# Patient Record
Sex: FEMALE | Race: WHITE | Employment: FULL TIME | URBAN - METROPOLITAN AREA
[De-identification: names, ages, dates, MRNs, and addresses within clinical notes are randomized per-mention and may not be internally consistent; named-entity substitution may affect disease eponyms.]

---

## 2017-02-07 RX ORDER — CITALOPRAM 40 MG/1
TABLET, FILM COATED ORAL
Qty: 30 TAB | Refills: 0 | OUTPATIENT
Start: 2017-02-07

## 2017-02-09 ENCOUNTER — HOSPITAL ENCOUNTER (OUTPATIENT)
Dept: MAMMOGRAPHY | Age: 67
Discharge: HOME | End: 2017-02-09
Attending: FAMILY MEDICINE
Payer: MEDICARE

## 2017-02-09 DIAGNOSIS — Z12.31 VISIT FOR SCREENING MAMMOGRAM: ICD-10-CM

## 2017-02-09 PROCEDURE — 77067 SCR MAMMO BI INCL CAD: CPT

## 2017-02-14 ENCOUNTER — OFFICE VISIT (OUTPATIENT)
Dept: FAMILY MEDICINE CLINIC | Age: 67
End: 2017-02-14

## 2017-02-14 VITALS
WEIGHT: 144.4 LBS | RESPIRATION RATE: 18 BRPM | HEART RATE: 69 BPM | BODY MASS INDEX: 25.59 KG/M2 | DIASTOLIC BLOOD PRESSURE: 80 MMHG | TEMPERATURE: 98.1 F | HEIGHT: 63 IN | SYSTOLIC BLOOD PRESSURE: 132 MMHG | OXYGEN SATURATION: 98 %

## 2017-02-14 DIAGNOSIS — F41.8 DEPRESSION WITH ANXIETY: ICD-10-CM

## 2017-02-14 DIAGNOSIS — R73.03 PRE-DIABETES: ICD-10-CM

## 2017-02-14 DIAGNOSIS — Z00.00 ROUTINE GENERAL MEDICAL EXAMINATION AT A HEALTH CARE FACILITY: ICD-10-CM

## 2017-02-14 DIAGNOSIS — Z00.00 ENCOUNTER FOR MEDICARE ANNUAL WELLNESS EXAM: Primary | ICD-10-CM

## 2017-02-14 DIAGNOSIS — E55.9 VITAMIN D DEFICIENCY: ICD-10-CM

## 2017-02-14 DIAGNOSIS — R31.1 BENIGN MICROSCOPIC HEMATURIA: Chronic | ICD-10-CM

## 2017-02-14 DIAGNOSIS — R31.9 HEMATURIA: ICD-10-CM

## 2017-02-14 DIAGNOSIS — E78.5 HYPERLIPIDEMIA, UNSPECIFIED HYPERLIPIDEMIA TYPE: ICD-10-CM

## 2017-02-14 DIAGNOSIS — M25.551 PAIN OF BOTH HIP JOINTS: ICD-10-CM

## 2017-02-14 DIAGNOSIS — M25.552 PAIN OF BOTH HIP JOINTS: ICD-10-CM

## 2017-02-14 DIAGNOSIS — Z87.891 FORMER SMOKER: ICD-10-CM

## 2017-02-14 DIAGNOSIS — R00.1 BRADYCARDIA: ICD-10-CM

## 2017-02-14 RX ORDER — NAPROXEN 250 MG/1
TABLET ORAL 2 TIMES DAILY WITH MEALS
COMMUNITY
Start: 2017-02-14 | End: 2021-12-08 | Stop reason: ALTCHOICE

## 2017-02-14 NOTE — MR AVS SNAPSHOT
Visit Information Date & Time Provider Department Dept. Phone Encounter #  
 2/14/2017  2:30 PM Rita Truong  [facility] [phone] [id number] Upcoming Health Maintenance Date Due Hepatitis C Screening 1950 GLAUCOMA SCREENING Q2Y 10/23/2017 Pneumococcal 65+ Low/Medium Risk (2 of 2 - PPSV23) 2/14/2018 MEDICARE YEARLY EXAM 2/15/2018 BREAST CANCER SCRN MAMMOGRAM 2/9/2019 COLONOSCOPY 11/2/2020 DTaP/Tdap/Td series (2 - Td) 11/18/2020 Allergies as of 2/14/2017  Review Complete On: 2/14/2017 By: Rita Truong MD  
  
 Severity Noted Reaction Type Reactions Wellbutrin [Bupropion]  03/11/2010    Unable to Obtain Current Immunizations  Reviewed on 9/16/2013 Name Date TDAP Vaccine 11/18/2010 Not reviewed this visit You Were Diagnosed With   
  
 Codes Comments Encounter for Medicare annual wellness exam    -  Primary ICD-10-CM: Z00.00 ICD-9-CM: V70.0 Hyperlipidemia, unspecified hyperlipidemia type     ICD-10-CM: E78.5 ICD-9-CM: 272.4 Pre-diabetes     ICD-10-CM: R73.03 
ICD-9-CM: 790.29 Former smoker     ICD-10-CM: J39.104 ICD-9-CM: V15.82 Depression with anxiety     ICD-10-CM: F41.8 ICD-9-CM: 300.4 Routine general medical examination at a health care facility     ICD-10-CM: Z00.00 ICD-9-CM: V70.0 Vitamin D deficiency     ICD-10-CM: E55.9 ICD-9-CM: 268.9 Pain of both hip joints     ICD-10-CM: M25.551, M25.552 ICD-9-CM: 719.45 Bradycardia     ICD-10-CM: R00.1 ICD-9-CM: 427.89 Hematuria     ICD-10-CM: R31.9 ICD-9-CM: 599.70 Vitals BP Pulse Temp Resp Height(growth percentile) Weight(growth percentile) 132/80 (BP 1 Location: Right arm, BP Patient Position: Sitting) 69 98.1 °F (36.7 °C) (Oral) 18 5' 3\" (1.6 m) 144 lb 6.4 oz (65.5 kg) LMP SpO2 BMI OB Status Smoking Status 01/01/2000 98% 25.58 kg/m2 Postmenopausal Former Smoker Vitals History BMI and BSA Data Body Mass Index Body Surface Area 25.58 kg/m 2 1.71 m 2 Preferred Pharmacy Pharmacy Name Phone [place], [address] [phone] Your Updated Medication List  
  
   
This list is accurate as of: 2/14/17  7:32 PM.  Always use your most recent med list.  
  
  
  
  
 ALPRAZolam 0.5 mg tablet Commonly known as:  XANAX  
take 1 tablet by mouth at bedtime  
  
 citalopram 40 mg tablet Commonly known as:  CELEXA  
take 1 tablet by mouth once daily  
  
 naproxen 250 mg tablet Commonly known as:  NAPROSYN Take  by mouth two (2) times daily (with meals). We Performed the Following AMB POC EKG ROUTINE W/ 12 LEADS, INTER & REP [51349 CPT(R)] CBC W/O DIFF [08714 CPT(R)] LIPID PANEL [55376 CPT(R)] METABOLIC PANEL, COMPREHENSIVE [93755 CPT(R)] IA COLLECTION VENOUS BLOOD,VENIPUNCTURE [id number] CPT(R)] IA HANDLG&/OR CONVEY OF SPEC FOR TR OFFICE TO LAB [07025 CPT(R)] Patient Instructions Well Visit, Women 48 to 72: Care Instructions Your Care Instructions Physical exams can help you stay healthy. Your doctor has checked your overall health and may have suggested ways to take good care of yourself. He or she also may have recommended tests. At home, you can help prevent illness with healthy eating, regular exercise, and other steps. Follow-up care is a key part of your treatment and safety. Be sure to make and go to all appointments, and call your doctor if you are having problems. It's also a good idea to know your test results and keep a list of the medicines you take. How can you care for yourself at home? · Reach and stay at a healthy weight. This will lower your risk for many problems, such as obesity, diabetes, heart disease, and high blood pressure. · Get at least 30 minutes of exercise on most days of the week.  Walking is a good choice. You also may want to do other activities, such as running, swimming, cycling, or playing tennis or team sports. · Do not smoke. Smoking can make health problems worse. If you need help quitting, talk to your doctor about stop-smoking programs and medicines. These can increase your chances of quitting for good. · Protect your skin from too much sun. When you're outdoors from 10 a.m. to 4 p.m., stay in the shade or cover up with clothing and a hat with a wide brim. Wear sunglasses that block UV rays. Even when it's cloudy, put broad-spectrum sunscreen (SPF 30 or higher) on any exposed skin. · See a dentist one or two times a year for checkups and to have your teeth cleaned. · Wear a seat belt in the car. · Limit alcohol to 1 drink a day. Too much alcohol can cause health problems. Follow your doctor's advice about when to have certain tests. These tests can spot problems early. · Cholesterol. Your doctor will tell you how often to have this done based on your age, family history, or other things that can increase your risk for heart attack and stroke. · Blood pressure. Have your blood pressure checked during a routine doctor visit. Your doctor will tell you how often to check your blood pressure based on your age, your blood pressure results, and other factors. · Mammogram. Ask your doctor how often you should have a mammogram, which is an X-ray of your breasts. A mammogram can spot breast cancer before it can be felt and when it is easiest to treat. · Pap test and pelvic exam. Ask your doctor how often you should have a Pap test. You may not need to have a Pap test as often as you used to. · Vision. Have your eyes checked every year or two or as often as your doctor suggests. Some experts recommend that you have yearly exams for glaucoma and other age-related eye problems starting at age 48. · Hearing. Tell your doctor if you notice any change in your hearing.  You can have tests to find out how well you hear. · Diabetes. Ask your doctor whether you should have tests for diabetes. · Colon cancer. You should begin tests for colon cancer at age 48. You may have one of several tests. Your doctor will tell you how often to have tests based on your age and risk. Risks include whether you already had a precancerous polyp removed from your colon or whether your parents, sisters and brothers, or children have had colon cancer. · Thyroid disease. Talk to your doctor about whether to have your thyroid checked as part of a regular physical exam. Women have an increased chance of a thyroid problem. · Osteoporosis. You should begin tests for bone density at age 72. If you are younger than 72, ask your doctor whether you have factors that may increase your risk for this disease. You may want to have this test before age 72. · Heart attack and stroke risk. At least every 4 to 6 years, you should have your risk for heart attack and stroke assessed. Your doctor uses factors such as your age, blood pressure, cholesterol, and whether you smoke or have diabetes to show what your risk for a heart attack or stroke is over the next 10 years. When should you call for help? Watch closely for changes in your health, and be sure to contact your doctor if you have any problems or symptoms that concern you. Where can you learn more? Go to http://elena-jason.info/. Enter F117 in the search box to learn more about \"Well Visit, Women 50 to 72: Care Instructions. \" Current as of: July 19, 2016 Content Version: 11.1 © 1573-1539 [employer]. Care instructions adapted under license by "[employer] (which disclaims liability or warranty for this information).  If you have questions about a medical condition or this instruction, always ask your healthcare professional. [place] [address] any warranty or liability for your use of this information. Introducing [facility]! [employer] introduces Move In History patient portal. Now you can access parts of your medical record, email your doctor's office, and request medication refills online. 1. In your internet browser, go to [email or web address] 2. Click on the First Time User? Click Here link in the Sign In box. You will see the New Member Sign Up page. 3. Enter your [employer] Access Code exactly as it appears below. You will not need to use this code after youve completed the sign-up process. If you do not sign up before the expiration date, you must request a new code. · Move In History Access Code: [facility] Expires: 4/13/2017  2:45 PM 
 
4. Enter the last four digits of your Social Security Number (xxxx) and Date of Birth (mm/dd/yyyy) as indicated and click Submit. You will be taken to the next sign-up page. 5. Create a Move In History ID. This will be your Move In History login ID and cannot be changed, so think of one that is secure and easy to remember. 6. Create a Move In History password. You can change your password at any time. 7. Enter your Password Reset Question and Answer. This can be used at a later time if you forget your password. 8. Enter your e-mail address. You will receive e-mail notification when new information is available in [address] 9. Click Sign Up. You can now view and download portions of your medical record. 10. Click the Download Summary menu link to download a portable copy of your medical information. If you have questions, please visit the Frequently Asked Questions section of the Move In History website. Remember, Move In History is NOT to be used for urgent needs. For medical emergencies, dial 911. Now available from your iPhone and Android! Please provide this summary of care documentation to your next provider. Your primary care clinician is listed as [place] [address], [facility]/s   If you have any questions after today's visit, please call [phone].

## 2017-02-14 NOTE — PATIENT INSTRUCTIONS
Well Visit, Women 48 to 72: Care Instructions  Your Care Instructions  Physical exams can help you stay healthy. Your doctor has checked your overall health and may have suggested ways to take good care of yourself. He or she also may have recommended tests. At home, you can help prevent illness with healthy eating, regular exercise, and other steps. Follow-up care is a key part of your treatment and safety. Be sure to make and go to all appointments, and call your doctor if you are having problems. It's also a good idea to know your test results and keep a list of the medicines you take. How can you care for yourself at home? · Reach and stay at a healthy weight. This will lower your risk for many problems, such as obesity, diabetes, heart disease, and high blood pressure. · Get at least 30 minutes of exercise on most days of the week. Walking is a good choice. You also may want to do other activities, such as running, swimming, cycling, or playing tennis or team sports. · Do not smoke. Smoking can make health problems worse. If you need help quitting, talk to your doctor about stop-smoking programs and medicines. These can increase your chances of quitting for good. · Protect your skin from too much sun. When you're outdoors from 10 a.m. to 4 p.m., stay in the shade or cover up with clothing and a hat with a wide brim. Wear sunglasses that block UV rays. Even when it's cloudy, put broad-spectrum sunscreen (SPF 30 or higher) on any exposed skin. · See a dentist one or two times a year for checkups and to have your teeth cleaned. · Wear a seat belt in the car. · Limit alcohol to 1 drink a day. Too much alcohol can cause health problems. Follow your doctor's advice about when to have certain tests. These tests can spot problems early. · Cholesterol.  Your doctor will tell you how often to have this done based on your age, family history, or other things that can increase your risk for heart attack and stroke. · Blood pressure. Have your blood pressure checked during a routine doctor visit. Your doctor will tell you how often to check your blood pressure based on your age, your blood pressure results, and other factors. · Mammogram. Ask your doctor how often you should have a mammogram, which is an X-ray of your breasts. A mammogram can spot breast cancer before it can be felt and when it is easiest to treat. · Pap test and pelvic exam. Ask your doctor how often you should have a Pap test. You may not need to have a Pap test as often as you used to. · Vision. Have your eyes checked every year or two or as often as your doctor suggests. Some experts recommend that you have yearly exams for glaucoma and other age-related eye problems starting at age 48. · Hearing. Tell your doctor if you notice any change in your hearing. You can have tests to find out how well you hear. · Diabetes. Ask your doctor whether you should have tests for diabetes. · Colon cancer. You should begin tests for colon cancer at age 48. You may have one of several tests. Your doctor will tell you how often to have tests based on your age and risk. Risks include whether you already had a precancerous polyp removed from your colon or whether your parents, sisters and brothers, or children have had colon cancer. · Thyroid disease. Talk to your doctor about whether to have your thyroid checked as part of a regular physical exam. Women have an increased chance of a thyroid problem. · Osteoporosis. You should begin tests for bone density at age 72. If you are younger than 72, ask your doctor whether you have factors that may increase your risk for this disease. You may want to have this test before age 72. · Heart attack and stroke risk. At least every 4 to 6 years, you should have your risk for heart attack and stroke assessed.  Your doctor uses factors such as your age, blood pressure, cholesterol, and whether you smoke or have diabetes to show what your risk for a heart attack or stroke is over the next 10 years. When should you call for help? Watch closely for changes in your health, and be sure to contact your doctor if you have any problems or symptoms that concern you. Where can you learn more? Go to http://elena-jason.info/. Enter T211 in the search box to learn more about \"Well Visit, Women 50 to 72: Care Instructions. \"  Current as of: July 19, 2016  Content Version: 11.1  © 3797-6498 [employer]. Care instructions adapted under license by [employer] (which disclaims liability or warranty for this information). If you have questions about a medical condition or this instruction, always ask your healthcare professional. [address] any warranty or liability for your use of this information.

## 2017-02-14 NOTE — PROGRESS NOTES
HISTORY OF PRESENT ILLNESS  HPI  Fco Cooper is a 77 y.o. Female with history of depression, anxiety, and hyperlipidemia who presents to office today for fasting annual Medicare wellness visit. Pt reports she has never taken Pravastatin for her cholesterol, stating she preferred to try and lose weight when it was first prescribed. 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 Pt [facility] every six months. She also notes a history of basal cell removal from her nose. Pt had negative mammogram one week ago at [facility] and reports all mammograms have been normal. She stopped getting a pap smear at age 61. Pap smears were always normal.    Pt complains of pain and lump under left cheek since 12/2017. She denies that the pain is aggravated by coughing or bending over. Pt notes chronic microscopic hematuria. She reports a complete work up around 1 year ago that showed no abnormalities. Pt takes naproxen BID for hip and shoulder pain. Pt also takes Omeprazole OTC 20mg for heartburn. She has been taking Xanax 0.5mg at bedtime for the past 2-3 years. Past Medical History   Diagnosis Date    Anxiety     Depression     Former smoker 3/11/2010    Hyperlipidemia 4/14/2014    Leg cramps 3/11/2010    Postmenopausal HRT (hormone replacement therapy) 3/11/2010     stopped 2010     Past Surgical History   Procedure Laterality Date    Hx gi       hemmorhoidectomy, fissure    Hx breast biopsy Right yrs ago     neg; u/s guided     Current Outpatient Prescriptions on File Prior to Visit   Medication Sig Dispense Refill    ALPRAZolam (XANAX) 0.5 mg tablet take 1 tablet by mouth at bedtime 30 Tab 2    citalopram (CELEXA) 40 mg tablet take 1 tablet by mouth once daily 30 Tab 11     No current facility-administered medications on file prior to visit.       Allergies   Allergen Reactions    Wellbutrin [Bupropion] Unable to Obtain     Family History   Problem Relation Age of Onset    Stroke Mother     Cancer Father      larynx    Cancer Sister      BRCA    Breast Cancer Sister 48    Diabetes Brother     Diabetes Sister     Psychiatric Disorder Sister      Rufus Wu    Breast Cancer Niece      Social History     Social History    Marital status:      Spouse name: N/A    Number of children: N/A    Years of education: N/A     Social History Main Topics    Smoking status: Former Smoker     Packs/day: 1.00     Years: 30.00     Types: Cigarettes     Quit date: 1/1/2005    Smokeless tobacco: Never Used    Alcohol use 0.0 oz/week     0 Standard drinks or equivalent per week      Comment: ocassional    Drug use: No    Sexual activity: Yes     Partners: Male     Other Topics Concern    None     Social History Narrative             Review of Systems   Constitutional: Negative for chills, diaphoresis, fever, malaise/fatigue and weight loss. HENT: Negative for congestion, ear discharge, ear pain, hearing loss, nosebleeds, sore throat and tinnitus. Tender lump under left cheek   Eyes: Negative for blurred vision, double vision, photophobia, pain, discharge and redness. Respiratory: Negative for cough, hemoptysis, sputum production, shortness of breath, wheezing and stridor. Cardiovascular: Negative for chest pain, palpitations, orthopnea, claudication, leg swelling and PND. Gastrointestinal: Negative for abdominal pain, blood in stool, constipation, diarrhea, heartburn, melena, nausea and vomiting. Genitourinary: Negative for dysuria, flank pain, frequency, hematuria and urgency. Musculoskeletal: Negative for back pain, falls, joint pain, myalgias and neck pain. Skin: Negative for itching and rash. Neurological: Negative for dizziness, tingling, tremors, sensory change, speech change, focal weakness, seizures, loss of consciousness, weakness and headaches.    Endo/Heme/Allergies: Negative for environmental allergies and polydipsia. Does not bruise/bleed easily. Psychiatric/Behavioral: Negative for depression, hallucinations, memory loss, substance abuse and suicidal ideas. The patient is not nervous/anxious and does not have insomnia. Results for orders placed or performed in visit on 10/23/15   LIPID PANEL   Result Value Ref Range    Cholesterol, total 201 (H) 100 - 199 mg/dL    Triglyceride 87 0 - 149 mg/dL    HDL Cholesterol 72 >39 mg/dL    VLDL, calculated 17 5 - 40 mg/dL    LDL, calculated 112 (H) 0 - 99 mg/dL   METABOLIC PANEL, COMPREHENSIVE   Result Value Ref Range    Glucose 89 65 - 99 mg/dL    BUN 19 8 - 27 mg/dL    Creatinine 0.71 0.57 - 1.00 mg/dL    GFR est non-AA 90 >59 mL/min/1.73    GFR est  >59 mL/min/1.73    BUN/Creatinine ratio 27 (H) 11 - 26    Sodium 142 134 - 144 mmol/L    Potassium 4.6 3.5 - 5.2 mmol/L    Chloride 102 97 - 108 mmol/L    CO2 25 18 - 29 mmol/L    Calcium 9.7 8.7 - 10.3 mg/dL    Protein, total 6.6 6.0 - 8.5 g/dL    Albumin 4.4 3.6 - 4.8 g/dL    GLOBULIN, TOTAL 2.2 1.5 - 4.5 g/dL    A-G Ratio 2.0 1.1 - 2.5    Bilirubin, total 0.2 0.0 - 1.2 mg/dL    Alk.  phosphatase 62 39 - 117 IU/L    AST (SGOT) 10 0 - 40 IU/L    ALT (SGPT) 11 0 - 32 IU/L   HEMOGLOBIN A1C   Result Value Ref Range    Hemoglobin A1c 6.0 (H) 4.8 - 5.6 %   THYROID PANEL, FREE T4/TSH   Result Value Ref Range    TSH 1.660 0.450 - 4.500 uIU/mL    T4, Free 1.18 0.82 - 1.77 ng/dL   CVD REPORT   Result Value Ref Range    INTERPRETATION Note    AMB POC URINALYSIS DIP STICK AUTO W/ MICRO   Result Value Ref Range    Color (UA POC) Yellow     Clarity (UA POC) Clear     Glucose (UA POC) Negative Negative    Bilirubin (UA POC) Negative Negative    Ketones (UA POC) Negative Negative    Specific gravity (UA POC) 1.010 1.001 - 1.035    Blood (UA POC) 2+ Negative    pH (UA POC) 5.5 4.6 - 8.0    Protein (UA POC) Negative Negative mg/dL    Urobilinogen (UA POC) 0.2 mg/dL 0.2 - 1    Nitrites (UA POC) Negative Negative    Leukocyte esterase (UA POC) Negative Negative             Physical Exam  Visit Vitals    /80 (BP 1 Location: Right arm, BP Patient Position: Sitting)    Pulse 69    Temp 98.1 °F (36.7 °C) (Oral)    Resp 18    Ht 5' 3\" (1.6 m)    Wt 144 lb 6.4 oz (65.5 kg)    LMP 01/01/2000    SpO2 98%    BMI 25.58 kg/m2     General:  Alert, cooperative, no distress, appears stated age. Head:  Normocephalic, without obvious abnormality, atraumatic. Eyes:  Conjunctivae/corneas clear. PERRL, EOMs intact. Fundi benign. Ears:  Normal TMs and external ear canals both ears. She has some slight decreased hearing of high frequency sounds in the right ear    Nose: Nares normal. Septum midline. Mucosa normal. No drainage or sinus tenderness. Throat: Lips, mucosa, and tongue normal. Examining the area above her left upper teeth, she has a less than 1cm hard nodule that is tender to palpation in the gum area, the mucosa looks normal    Neck: Supple, symmetrical, trachea midline, no adenopathy, thyroid: no enlargement/tenderness/nodules, no carotid bruit and no JVD. Back:   Symmetric, no curvature. ROM normal. No CVA tenderness. Lungs:   Clear to auscultation bilaterally. Chest wall:  No tenderness or deformity. Heart:  Regular rate and rhythm, S1, S2 normal, no murmur, click, rub or gallop. Abdomen:   Soft, non-tender. Bowel sounds normal. No masses,  No organomegaly. Extremities: Extremities normal, atraumatic, no cyanosis or edema. Pulses: 2+ and symmetric all extremities. Skin: Skin color, texture, turgor normal. No rashes or lesions. Lymph nodes: Cervical, supraclavicular, and axillary nodes normal.   Neurologic: CNII-XII intact. Normal strength, sensation and reflexes throughout. ASSESSMENT and PLAN    ICD-10-CM ICD-9-CM    1.  Encounter for Medicare annual wellness exam Z00.00 V70.0 AMB POC EKG ROUTINE W/ 12 LEADS, INTER & REP      CBC W/O DIFF      MS HANDLG&/OR CONVEY OF SPEC FOR TR OFFICE TO LAB      OK COLLECTION VENOUS BLOOD,VENIPUNCTURE   2. Hyperlipidemia, unspecified hyperlipidemia type E78.5 272.4 LIPID PANEL      METABOLIC PANEL, COMPREHENSIVE      OK HANDLG&/OR CONVEY OF SPEC FOR TR OFFICE TO LAB      OK COLLECTION VENOUS BLOOD,VENIPUNCTURE   3. Pre-diabetes R73.03 790.29 OK HANDLG&/OR CONVEY OF SPEC FOR TR OFFICE TO LAB      OK COLLECTION VENOUS BLOOD,VENIPUNCTURE      CANCELED: URINALYSIS W/MICROSCOPIC   4. Former smoker Z87.891 V15.82    5. Depression with anxiety F41.8 300.4    6. Routine general medical examination at a health care facility Z00.00 V70.0    7. Vitamin D deficiency E55.9 268.9    8. Pain of both hip joints M25.551 719.45 naproxen (NAPROSYN) 250 mg tablet    M25.552     9. Bradycardia R00.1 427.89    10. Hematuria R31.9 599.70      Carlos Guy was seen today for annual wellness visit.     Diagnoses and all orders for this visit:    Encounter for Medicare annual wellness exam  -     AMB POC EKG ROUTINE W/ 12 LEADS, INTER & REP  -     CBC W/O DIFF  -     OK HANDLG&/OR CONVEY OF SPEC FOR TR OFFICE TO LAB  -     OK COLLECTION VENOUS BLOOD,VENIPUNCTURE    Hyperlipidemia, unspecified hyperlipidemia type  -     LIPID PANEL  -     METABOLIC PANEL, COMPREHENSIVE  -     OK HANDLG&/OR CONVEY OF SPEC FOR TR OFFICE TO LAB  -     OK COLLECTION VENOUS BLOOD,VENIPUNCTURE    Pre-diabetes  -     Cancel: URINALYSIS W/MICROSCOPIC  -     OK HANDLG&/OR CONVEY OF SPEC FOR TR OFFICE TO LAB  -     OK COLLECTION VENOUS BLOOD,VENIPUNCTURE    Former smoker    Depression with anxiety    Routine general medical examination at a health care facility    Vitamin D deficiency    Pain of both hip joints    Bradycardia    Hematuria      Follow-up Disposition:  Return if tooth pain or hip pain/symptoms worsen or fail to improve.     lab results and schedule of future lab studies reviewed with patient  reviewed diet, exercise and weight control  cardiovascular risk and specific lipid/LDL goals reviewed  reviewed medications and side effects in detail  Please call my office if there are any questions- [phone]. I will arrange for follow up after the lab tests done from today return  Recommended a weekly \"heart check. \" I went into detail how to do this. Call for refills on medications as needed. Discussed expected course/resolution/complications of diagnosis in detail with patient. Medication risks/benefits/costs/interactions/alternatives discussed with patient. Pt was given an after visit summary which includes diagnoses, current medications & vitals. Pt expressed understanding with the diagnosis and plan. I stressed the importance of doing a \"heart check\" on a weekly basis and explained to her how to do that. I encouraged her to call the dentist for her tooth problem; she probably needs antibiotics, but I will let them decide that. This document was written by Jarad Hong, as dictated by Aroldo Nicholas MD.   I have reviewed and agree with the above note and have made corrections where appropriate Noé Ross M.D.

## 2017-02-15 LAB
ALBUMIN SERPL-MCNC: 4.5 G/DL (ref 3.6–4.8)
ALBUMIN/GLOB SERPL: 2 {RATIO} (ref 1.1–2.5)
ALP SERPL-CCNC: 57 IU/L (ref 39–117)
ALT SERPL-CCNC: 16 IU/L (ref 0–32)
AST SERPL-CCNC: 19 IU/L (ref 0–40)
BILIRUB SERPL-MCNC: 0.3 MG/DL (ref 0–1.2)
BUN SERPL-MCNC: 15 MG/DL (ref 8–27)
BUN/CREAT SERPL: 23 (ref 11–26)
CALCIUM SERPL-MCNC: 9.6 MG/DL (ref 8.7–10.3)
CHLORIDE SERPL-SCNC: 102 MMOL/L (ref 96–106)
CHOLEST SERPL-MCNC: 215 MG/DL (ref 100–199)
CO2 SERPL-SCNC: 24 MMOL/L (ref 18–29)
CREAT SERPL-MCNC: 0.66 MG/DL (ref 0.57–1)
ERYTHROCYTE [DISTWIDTH] IN BLOOD BY AUTOMATED COUNT: 14.4 % (ref 12.3–15.4)
GLOBULIN SER CALC-MCNC: 2.2 G/DL (ref 1.5–4.5)
GLUCOSE SERPL-MCNC: 89 MG/DL (ref 65–99)
HCT VFR BLD AUTO: 40.9 % (ref 34–46.6)
HDLC SERPL-MCNC: 70 MG/DL
HGB BLD-MCNC: 13.8 G/DL (ref 11.1–15.9)
INTERPRETATION, 910389: NORMAL
LDLC SERPL CALC-MCNC: 132 MG/DL (ref 0–99)
MCH RBC QN AUTO: 29.9 PG (ref 26.6–33)
MCHC RBC AUTO-ENTMCNC: 33.7 G/DL (ref 31.5–35.7)
MCV RBC AUTO: 89 FL (ref 79–97)
PLATELET # BLD AUTO: 331 X10E3/UL (ref 150–379)
POTASSIUM SERPL-SCNC: 4.1 MMOL/L (ref 3.5–5.2)
PROT SERPL-MCNC: 6.7 G/DL (ref 6–8.5)
RBC # BLD AUTO: 4.61 X10E6/UL (ref 3.77–5.28)
SODIUM SERPL-SCNC: 142 MMOL/L (ref 134–144)
TRIGL SERPL-MCNC: 66 MG/DL (ref 0–149)
VLDLC SERPL CALC-MCNC: 13 MG/DL (ref 5–40)
WBC # BLD AUTO: 7.1 X10E3/UL (ref 3.4–10.8)

## 2017-02-15 NOTE — PROGRESS NOTES
Advance Care Planning (ACP) Provider Note - Comprehensive     Date of ACP Conversation: 02/14/17  Persons included in Conversation:  patient  Length of ACP Conversation in minutes:  <16 minutes (Non-Billable)    Authorized Decision Maker (if patient is incapable of making informed decisions): This person is:  Healthcare Agent/Medical Power of  under Advance Directive          General ACP for ALL Patients with Decision Making Capacity:   Importance of advance care planning, including choosing a healthcare agent to communicate patient's healthcare decisions if patient lost the ability to make decisions, such as after a sudden illness or accident  Understanding of the healthcare agent role was assessed and information provided  Exploration of values, goals, and preferences if recovery is not expected, even with continued medical treatment in the event of: Imminent death    Review of Existing Advance Directive:  What information were you given about medical decisions to consider before completing your advance directive? examples of when the Adv directive would and would not be needed. For Serious or Chronic Illness:  Understanding of CPR, goals and expected outcomes, benefits and burdens discussed. Information on CPR success rates provided (e.g. for CPR in hospital, survival to d/c at two weeks is 22%, for chronically ill or elderly/frail survival is less than 3%); Individual asked to communicate understanding of information in his/her own words.     Interventions Provided:  Recommended completion of Advance Directive form after review of ACP materials and conversation with prospective healthcare agent

## 2017-02-15 NOTE — PROGRESS NOTES
Mau Zarco is a 77 y.o. female and presents for annual Medicare Wellness Visit. Problem List: Reviewed with patient and discussed risk factors. Patient Active Problem List   Diagnosis Code    Former smoker Z87.891    Postmenopausal HRT (hormone replacement therapy) Z79.890    Leg cramps R25.2    Depression with anxiety F41.8    Hyperlipidemia E78.5    Pre-diabetes R73.03       Current medical providers:  Patient Care Team:  Lala Bueno MD as PCP - General    PSH: Reviewed with patient  Past Surgical History   Procedure Laterality Date    Hx gi       hemmorhoidectomy, fissure    Hx breast biopsy Right yrs ago     neg; u/s guided        SH: Reviewed with patient  Social History   Substance Use Topics    Smoking status: Former Smoker     Packs/day: 1.00     Years: 30.00     Types: Cigarettes     Quit date: 1/1/2005    Smokeless tobacco: Never Used    Alcohol use 0.0 oz/week     0 Standard drinks or equivalent per week      Comment: ocassional       FH: Reviewed with patient  Family History   Problem Relation Age of Onset    Stroke Mother     Cancer Father      larynx    Cancer Sister      BRCA    Breast Cancer Sister 48    Diabetes Brother     Diabetes Sister     Psychiatric Disorder Sister      rTav Rodriguez    Breast Cancer Niece        Medications/Allergies: Reviewed with patient  Current Outpatient Prescriptions on File Prior to Visit   Medication Sig Dispense Refill    ALPRAZolam (XANAX) 0.5 mg tablet take 1 tablet by mouth at bedtime 30 Tab 2    citalopram (CELEXA) 40 mg tablet take 1 tablet by mouth once daily 30 Tab 11     No current facility-administered medications on file prior to visit.        Allergies   Allergen Reactions    Wellbutrin [Bupropion] Unable to Obtain       Objective:  Visit Vitals    /80 (BP 1 Location: Right arm, BP Patient Position: Sitting)    Pulse 69    Temp 98.1 °F (36.7 °C) (Oral)    Resp 18    Ht 5' 3\" (1.6 m)    Wt 144 lb 6.4 oz (65.5 kg)  LMP 01/01/2000    SpO2 98%    BMI 25.58 kg/m2    Body mass index is 25.58 kg/(m^2). Assessment of cognitive impairment: Alert and oriented x 3      Depression Screen:   PHQ 2 / 9, over the last two weeks 2/14/2017   Little interest or pleasure in doing things Not at all   Feeling down, depressed or hopeless Not at all   Total Score PHQ 2 0       Fall Risk Assessment:    Fall Risk Assessment, last 12 mths 2/14/2017   Able to walk? Yes   Fall in past 12 months? No   Fall with injury? -   Number of falls in past 12 months -   Fall Risk Score -       Functional Ability:   Does the patient exhibit a steady gait? yes   How long did it take the patient to get up and walk from a sitting position? 3-4 seconds     Is the patient self reliant?  (ie can do own laundry, meals, household chores)  yes     Does the patient handle his/her own medications? yes     Does the patient handle his/her own money? yes     Is the patients home safe (ie good lighting, handrails on stairs and bath, etc.)? yes     Did you notice or did patient express any hearing difficulties? Yes; mild decrease according to grand daughter     Did you notice or did patient express any vision difficulties?   no     Were distance and reading eye charts used? Yes. Patient's  full eye exam by an ophthalmologist every 2 years is unremarkable: no glaucoma or macular degeneration. Advance Care Planning:   Patient was offered the opportunity to discuss advance care planning:  yes     Does patient have an Advance Directive:  no   If no, did you provide information on [employer]? Yes. I reviewed advanced directive information and written information given to patient; patient to make follow up appointment with a NN to review choices for health care, agent, and anatomical gifts.          Plan:      Orders Placed This Encounter    LIPID PANEL    METABOLIC PANEL, COMPREHENSIVE    CBC W/O DIFF    AMB POC EKG ROUTINE W/ 12 LEADS, INTER & REP    CT HANDLG&/OR CONVEY OF SPEC FOR TR OFFICE TO LAB    CT COLLECTION VENOUS BLOOD,VENIPUNCTURE    naproxen (NAPROSYN) 250 mg tablet       Health Maintenance   Topic Date Due    Hepatitis C Screening  1950    GLAUCOMA SCREENING Q2Y  10/23/2017    Pneumococcal 65+ Low/Medium Risk (2 of 2 - PPSV23) 02/14/2018    MEDICARE YEARLY EXAM  02/15/2018    BREAST CANCER SCRN MAMMOGRAM  02/09/2019    COLONOSCOPY  11/02/2020    DTaP/Tdap/Td series (2 - Td) 11/18/2020    OSTEOPOROSIS SCREENING (DEXA)  Completed    ZOSTER VACCINE AGE 60>  Addressed    INFLUENZA AGE 9 TO ADULT  Addressed       *Patient verbalized understanding and agreement with the plan. A copy of the After Visit Summary with personalized health plan was given to the patient today.

## 2017-02-27 RX ORDER — ALPRAZOLAM 0.5 MG/1
TABLET ORAL
Qty: 30 TAB | Refills: 2 | Status: SHIPPED | OUTPATIENT
Start: 2017-02-27 | End: 2017-06-24 | Stop reason: SDUPTHER

## 2017-03-10 RX ORDER — CITALOPRAM 40 MG/1
TABLET, FILM COATED ORAL
Qty: 30 TAB | Refills: 11 | Status: SHIPPED | OUTPATIENT
Start: 2017-03-10 | End: 2018-03-04 | Stop reason: SDUPTHER

## 2017-06-26 RX ORDER — ALPRAZOLAM 0.5 MG/1
TABLET ORAL
Qty: 30 TAB | Refills: 0 | Status: SHIPPED | OUTPATIENT
Start: 2017-06-26 | End: 2017-08-08 | Stop reason: SDUPTHER

## 2017-06-28 RX ORDER — ALPRAZOLAM 0.5 MG/1
TABLET ORAL
Qty: 30 TAB | Refills: 0 | OUTPATIENT
Start: 2017-06-28

## 2017-08-08 RX ORDER — ALPRAZOLAM 0.5 MG/1
TABLET ORAL
Qty: 30 TAB | Refills: 0 | Status: SHIPPED | OUTPATIENT
Start: 2017-08-08 | End: 2017-09-11 | Stop reason: SDUPTHER

## 2017-09-18 ENCOUNTER — TELEPHONE (OUTPATIENT)
Dept: FAMILY MEDICINE CLINIC | Age: 67
End: 2017-09-18

## 2017-09-18 ENCOUNTER — OFFICE VISIT (OUTPATIENT)
Dept: FAMILY MEDICINE CLINIC | Age: 67
End: 2017-09-18

## 2017-09-18 VITALS
HEIGHT: 63 IN | DIASTOLIC BLOOD PRESSURE: 67 MMHG | RESPIRATION RATE: 18 BRPM | OXYGEN SATURATION: 97 % | BODY MASS INDEX: 24.31 KG/M2 | TEMPERATURE: 97.9 F | HEART RATE: 71 BPM | SYSTOLIC BLOOD PRESSURE: 109 MMHG | WEIGHT: 137.2 LBS

## 2017-09-18 DIAGNOSIS — B00.1 FEVER BLISTER: Primary | ICD-10-CM

## 2017-09-18 RX ORDER — VALACYCLOVIR HYDROCHLORIDE 500 MG/1
500 TABLET, FILM COATED ORAL 2 TIMES DAILY
Qty: 90 TAB | Refills: 3 | Status: SHIPPED | OUTPATIENT
Start: 2017-09-18 | End: 2018-08-18 | Stop reason: SDUPTHER

## 2017-09-18 NOTE — TELEPHONE ENCOUNTER
MD Celia Ricardo LPN        Caller: Unspecified (Today,  8:03 AM)                     Never given to her before; work in today for this

## 2017-09-18 NOTE — MR AVS SNAPSHOT
Visit Information Date & Time Provider Department Dept. Phone Encounter #  
 9/18/2017  2:00 PM Katie Gann  [facility] [phone] [id number] Upcoming Health Maintenance Date Due Hepatitis C Screening 1950 INFLUENZA AGE 9 TO ADULT 8/1/2017 GLAUCOMA SCREENING Q2Y 10/23/2017 Pneumococcal 65+ Low/Medium Risk (2 of 2 - PPSV23) 2/14/2018 MEDICARE YEARLY EXAM 2/15/2018 BREAST CANCER SCRN MAMMOGRAM 2/9/2019 COLONOSCOPY 11/2/2020 DTaP/Tdap/Td series (2 - Td) 11/18/2020 Allergies as of 9/18/2017  Review Complete On: 9/18/2017 By: Ceasar Vallejo LPN Severity Noted Reaction Type Reactions Wellbutrin [Bupropion]  03/11/2010    Unable to Obtain Current Immunizations  Reviewed on 9/16/2013 Name Date TDAP Vaccine 11/18/2010 Not reviewed this visit Vitals BP Pulse Temp Resp Height(growth percentile) Weight(growth percentile) 109/67 (BP 1 Location: Left arm, BP Patient Position: Sitting) 71 97.9 °F (36.6 °C) (Oral) 18 5' 3\" (1.6 m) 137 lb 3.2 oz (62.2 kg) LMP SpO2 BMI OB Status Smoking Status 01/01/2000 97% 24.3 kg/m2 Postmenopausal Former Smoker Vitals History BMI and BSA Data Body Mass Index Body Surface Area  
 24.3 kg/m 2 1.66 m 2 Preferred Pharmacy Pharmacy Name Phone [facility] [address] [place] [address] [phone] Your Updated Medication List  
  
   
This list is accurate as of: 9/18/17  2:32 PM.  Always use your most recent med list.  
  
  
  
  
 ALPRAZolam 0.5 mg tablet Commonly known as:  XANAX  
TAKE ONE TABLET BY MOUTH ONCE DAILY AT BEDTIME  
  
 citalopram 40 mg tablet Commonly known as:  CELEXA  
take 1 tablet by mouth once daily  
  
 naproxen 250 mg tablet Commonly known as:  NAPROSYN Take  by mouth two (2) times daily (with meals). valACYclovir 500 mg tablet Commonly known as:  VALTREX Take 1 Tab by mouth two (2) times a day. For 1 week, then daily to prevent Prescriptions Sent to Pharmacy Refills  
 valACYclovir (VALTREX) 500 mg tablet 3 Sig: Take 1 Tab by mouth two (2) times a day. For 1 week, then daily to prevent Class: Normal  
 Pharmacy: [place] [address] [address] [place]  #: [phone] Route: Oral  
  
Patient Instructions Cold Sores: Care Instructions Your Care Instructions Cold sores are clusters of small blisters on the lip and skin around or inside the mouth. Often the first sign of a cold sore is a spot that tingles, burns, or itches. A blister usually forms within 24 hours. The skin around the blisters can be red and inflamed. The blisters can break open, weep a clear fluid, and then scab over after a few days. Cold sores most often heal in 7 to 10 days without a scar. They are sometimes called fever blisters. Cold sores are caused by a virus called the herpes simplex virus. This virus also causes some cases of genital herpes. The virus can spread from a sore in the genital area to the lips. Or it can spread from a cold sore on the lips to the genital area. Cold sores most often go away on their own. But if they are severe, embarrass you, or cause pain, your doctor may prescribe antiviral medicine to relieve pain and help prevent outbreaks. Follow-up care is a key part of your treatment and safety. Be sure to make and go to all appointments, and call your doctor if you are having problems. It's also a good idea to know your test results and keep a list of the medicines you take. How can you care for yourself at home? · Wash your hands often. And try not to touch your cold sores. This will help to avoid spreading the virus to your eyes or genital area, or to other people. This is more likely to happen if this is your first cold sore outbreak. · Place ice or a cool, wet towel on the sores 3 times a day.  Do this for 20 minutes each time. It may help to reduce redness and swelling. · If you are just getting a cold sore, try over-the-counter docosanol (Abreva) cream to reduce symptoms. · If your doctor prescribed antiviral medicine to relieve pain and help prevent outbreaks, be sure to follow the directions. · Take an over-the-counter pain medicine, such as acetaminophen (Tylenol), ibuprofen (Advil, Motrin), or naproxen (Aleve), as needed. Read and follow all instructions on the label. · Do not take two or more pain medicines at the same time unless the doctor told you to. Many pain medicines have acetaminophen, which is Tylenol. Too much acetaminophen (Tylenol) can be harmful. · Avoid citrus fruit, tomatoes, and other foods that contain acid. · Use over-the-counter ointments to numb sore areas in the mouth or on the lips. These include Orajel and Anbesol. · Do not kiss or have oral sex with anyone while you have a cold sore. To prevent cold sores in the future · Avoid long exposure of your lips to the sun. (Wear a hat to help shade your mouth.) · Do not kiss or have oral sex with someone who has a cold sore. Do not have sex or oral sex with someone who has a genital herpes outbreak. · Using lip balm that contains sunscreen may help reduce outbreaks of cold sores. · Avoid foods that seem to cause your cold sores to come back. · Do not share towels, razors, silverware, toothbrushes, or other objects with a person who has a cold sore. When should you call for help? Call your doctor now or seek immediate medical care if: 
· Your symptoms are painful and you want to try antiviral medicine. · You have signs of infection, such as: 
¨ Increased pain, swelling, warmth, or redness. ¨ Red streaks leading from a cold sore. ¨ Pus draining from a cold sore. ¨ A fever. · You have a cold sore and develop eye pain, eye discharge, or any changes in your vision. Watch closely for changes in your health, and be sure to contact your doctor if: · The cold sore does not heal in 7 to 10 days. · You get cold sores often. Where can you learn more? Go to http://elena-jason.info/. Enter V038 in the search box to learn more about \"Cold Sores: Care Instructions. \" Current as of: March 20, 2017 Content Version: 11.3 © 5946-7296 [employer]. Care instructions adapted under license by [employer] (which disclaims liability or warranty for this information). If you have questions about a medical condition or this instruction, always ask your healthcare professional. [address] any warranty or liability for your use of this information. Introducing \[facility]\"" & HEALTH SERVICES! Maryan Fernandez introduces [employer] patient portal. Now you can access parts of your medical record, email your doctor's office, and request medication refills online. 1. In your internet browser, go to [email or web address] 2. Click on the First Time User? Click Here link in the Sign In box. You will see the New Member Sign Up page. 3. Enter your [employer] Access Code exactly as it appears below. You will not need to use this code after youve completed the sign-up process. If you do not sign up before the expiration date, you must request a new code. · [employer] Access Code: [email or web address] Expires: 12/17/2017  2:32 PM 
 
4. Enter the last four digits of your Social Security Number (xxxx) and Date of Birth (mm/dd/yyyy) as indicated and click Submit. You will be taken to the next sign-up page. 5. Create a inMotionNowt ID. This will be your [employer] login ID and cannot be changed, so think of one that is secure and easy to remember. 6. Create a [employer] password. You can change your password at any time. 7. Enter your Password Reset Question and Answer. This can be used at a later time if you forget your password. 8. Enter your e-mail address. You will receive e-mail notification when new information is available in [address] 9. Click Sign Up. You can now view and download portions of your medical record. 10. Click the Download Summary menu link to download a portable copy of your medical information. If you have questions, please visit the Frequently Asked Questions section of the [employer] website. Remember, [employer] is NOT to be used for urgent needs. For medical emergencies, dial 911. Now available from your iPhone and Android! Please provide this summary of care documentation to your next provider. Your primary care clinician is listed as [address] [place] [address], [facility]/s . If you have any questions after today's visit, please call [phone].

## 2017-09-18 NOTE — PROGRESS NOTES
\"Reviewed record in preparation for visit and have obtained the necessary documentation\"  Chief Complaint   Patient presents with    Mouth Lesions     to [facility] of mouth x 1.5 months, patient has cold sores chronically but normally treats OTC                1. Have you been to the ER, urgent care clinic since your last visit? Hospitalized since your last visit? No    2. Have you seen or consulted any other health care providers outside of the [address] [place] [address] since your last visit? Include any pap smears or colon screening.  No    corner

## 2017-09-18 NOTE — PROGRESS NOTES
HISTORY OF PRESENT ILLNESS  HPI  Jose Hendrix is a 79 y.o. Female with history of hyperlipidemia, anxiety, and depression who presents to office today for mouth lesions. She's been having fever blisters since the age of 25 or so. Initially they were only on her upper lip, but through the year they've spread to her lower lip and even at the corners of her mouth at times. She's never really taken anything for this but has used OTC products to help them heal, etc.      Past Medical History:   Diagnosis Date    Anxiety     Benign microscopic hematuria- most recent w/u neg in 2016 2/14/2017    Depression     Former smoker 3/11/2010    Hyperlipidemia 4/14/2014    Leg cramps 3/11/2010    Postmenopausal HRT (hormone replacement therapy) 3/11/2010    stopped 2010     Past Surgical History:   Procedure Laterality Date    HX BREAST BIOPSY Right yrs ago    neg; u/s guided    HX GI      hemmorhoidectomy, fissure     Current Outpatient Prescriptions on File Prior to Visit   Medication Sig Dispense Refill    ALPRAZolam (XANAX) 0.5 mg tablet TAKE ONE TABLET BY MOUTH ONCE DAILY AT BEDTIME 30 Tab 0    citalopram (CELEXA) 40 mg tablet take 1 tablet by mouth once daily 30 Tab 11    naproxen (NAPROSYN) 250 mg tablet Take  by mouth two (2) times daily (with meals). No current facility-administered medications on file prior to visit.       Allergies   Allergen Reactions    Wellbutrin [Bupropion] Unable to Obtain     Family History   Problem Relation Age of Onset    Stroke Mother     Cancer Father      larynx    Cancer Sister      BRCA    Breast Cancer Sister 48    Diabetes Brother     Diabetes Sister     Psychiatric Disorder Sister      Justa Herrera    Breast Cancer Niece      Social History     Social History    Marital status:      Spouse name: N/A    Number of children: N/A    Years of education: N/A     Social History Main Topics    Smoking status: Former Smoker     Packs/day: 1.00     Years: 30. 00     Types: Cigarettes     Quit date: 1/1/2005    Smokeless tobacco: Never Used    Alcohol use 0.0 oz/week     0 Standard drinks or equivalent per week      Comment: ocassional    Drug use: No    Sexual activity: Yes     Partners: Male     Other Topics Concern    None     Social History Narrative             Review of Systems   Constitutional: Negative for chills, diaphoresis, fever, malaise/fatigue and weight loss. Eyes: Negative for blurred vision, double vision, pain and redness. Respiratory: Negative for cough, shortness of breath and wheezing. Cardiovascular: Negative for chest pain, palpitations, orthopnea, claudication, leg swelling and PND. Skin: Positive for rash (mouth corners, lips). Negative for itching. Neurological: Negative for dizziness, tingling, tremors, sensory change, speech change, focal weakness, seizures, loss of consciousness, weakness and headaches. Results for orders placed or performed in visit on 02/14/17   LIPID PANEL   Result Value Ref Range    Cholesterol, total 215 (H) 100 - 199 mg/dL    Triglyceride 66 0 - 149 mg/dL    HDL Cholesterol 70 >39 mg/dL    VLDL, calculated 13 5 - 40 mg/dL    LDL, calculated 132 (H) 0 - 99 mg/dL   METABOLIC PANEL, COMPREHENSIVE   Result Value Ref Range    Glucose 89 65 - 99 mg/dL    BUN 15 8 - 27 mg/dL    Creatinine 0.66 0.57 - 1.00 mg/dL    GFR est non-AA 92 >59 mL/min/1.73    GFR est  >59 mL/min/1.73    BUN/Creatinine ratio 23 11 - 26    Sodium 142 134 - 144 mmol/L    Potassium 4.1 3.5 - 5.2 mmol/L    Chloride 102 96 - 106 mmol/L    CO2 24 18 - 29 mmol/L    Calcium 9.6 8.7 - 10.3 mg/dL    Protein, total 6.7 6.0 - 8.5 g/dL    Albumin 4.5 3.6 - 4.8 g/dL    GLOBULIN, TOTAL 2.2 1.5 - 4.5 g/dL    A-G Ratio 2.0 1.1 - 2.5    Bilirubin, total 0.3 0.0 - 1.2 mg/dL    Alk.  phosphatase 57 39 - 117 IU/L    AST (SGOT) 19 0 - 40 IU/L    ALT (SGPT) 16 0 - 32 IU/L   CBC W/O DIFF   Result Value Ref Range    WBC 7.1 3.4 - 10.8 x10E3/uL    RBC 4. 61 3.77 - 5.28 x10E6/uL    HGB 13.8 11.1 - 15.9 g/dL    HCT 40.9 34.0 - 46.6 %    MCV 89 79 - 97 fL    MCH 29.9 26.6 - 33.0 pg    MCHC 33.7 31.5 - 35.7 g/dL    RDW 14.4 12.3 - 15.4 %    PLATELET 297 028 - 514 x10E3/uL   CVD REPORT   Result Value Ref Range    INTERPRETATION Note              Physical Exam  Visit Vitals    /67 (BP 1 Location: Left arm, BP Patient Position: Sitting)    Pulse 71    Temp 97.9 °F (36.6 °C) (Oral)    Resp 18    Ht 5' 3\" (1.6 m)    Wt 137 lb 3.2 oz (62.2 kg)    LMP 01/01/2000    SpO2 97%    BMI 24.3 kg/m2     Skin: she has a healing sore on the lower lip midline, also one at the left corner of the mouth. She does have some healing superficial ulcerations on the inside aspect of the upper lip. The rest of the exam is deferred. ASSESSMENT and PLAN    ICD-10-CM ICD-9-CM    1. Fever blister- multiple locations, recurring ~ once a month for years B00.1 054.9 valACYclovir (VALTREX) 500 mg tablet     Diagnoses and all orders for this visit:    1. Fever blister- multiple locations, recurring ~ once a month for years  -     valACYclovir (VALTREX) 500 mg tablet; Take 1 Tab by mouth two (2) times a day. For 1 week, then daily to prevent      Follow-up Disposition:  Return if symptoms worsen or fail to improve. reviewed medications and side effects in detail  Please call my office if there are any questions- [phone]. Discussed expected course/resolution/complications of diagnosis in detail with patient. Medication risks/benefits/costs/interactions/alternatives discussed with patient. Pt was given an after visit summary which includes diagnoses, current medications & vitals. Pt expressed understanding with the diagnosis and plan. Patient to call if no better in 3 -4 days and prn new problems. I told pt that, because she gets them almost every month, we'll put her on suppressive therapy of Valtrex 500mg BID for one week, and then once a day after that.  Any time she has a flare-up, she should go back to BID for a week. If she continues to have them even with the above recommendation, we could increase the daily treatment to BID as well. I discussed how contagious they are and that they can be deadly to  babies, so she needs to be careful in that type of situation. Direct contact is most common, through either kissing or drinking after somebody. I told her that there are some topical treatments for the actual blisters that form( OTC and Rx), but she's using OTC products right now and feels comfortable with that. This document was written by Ramón Beckett, as dictated by Zaina Constantino MD.  I have reviewed and agree with the above note and have made corrections where appropriate Noé Rapp M.D.

## 2017-09-18 NOTE — TELEPHONE ENCOUNTER
----- Message from Kortney Soto sent at 9/16/2017  1:38 PM EDT -----  Regarding: Dr. Sanjuana Gomez telephone  Contact: [phone]  Pt is requesting a prescription for valtrex for her cold sores. Pt is having issue around mouth and is unable to eat. Pt uses [facility] in [facility].  Pt's best contact number is [phone]

## 2017-09-18 NOTE — PATIENT INSTRUCTIONS
Cold Sores: Care Instructions  Your Care Instructions  Cold sores are clusters of small blisters on the lip and skin around or inside the mouth. Often the first sign of a cold sore is a spot that tingles, burns, or itches. A blister usually forms within 24 hours. The skin around the blisters can be red and inflamed. The blisters can break open, weep a clear fluid, and then scab over after a few days. Cold sores most often heal in 7 to 10 days without a scar. They are sometimes called fever blisters. Cold sores are caused by a virus called the herpes simplex virus. This virus also causes some cases of genital herpes. The virus can spread from a sore in the genital area to the lips. Or it can spread from a cold sore on the lips to the genital area. Cold sores most often go away on their own. But if they are severe, embarrass you, or cause pain, your doctor may prescribe antiviral medicine to relieve pain and help prevent outbreaks. Follow-up care is a key part of your treatment and safety. Be sure to make and go to all appointments, and call your doctor if you are having problems. It's also a good idea to know your test results and keep a list of the medicines you take. How can you care for yourself at home? · Wash your hands often. And try not to touch your cold sores. This will help to avoid spreading the virus to your eyes or genital area, or to other people. This is more likely to happen if this is your first cold sore outbreak. · Place ice or a cool, wet towel on the sores 3 times a day. Do this for 20 minutes each time. It may help to reduce redness and swelling. · If you are just getting a cold sore, try over-the-counter docosanol (Abreva) cream to reduce symptoms. · If your doctor prescribed antiviral medicine to relieve pain and help prevent outbreaks, be sure to follow the directions.   · Take an over-the-counter pain medicine, such as acetaminophen (Tylenol), ibuprofen (Advil, Motrin), or naproxen (Aleve), as needed. Read and follow all instructions on the label. · Do not take two or more pain medicines at the same time unless the doctor told you to. Many pain medicines have acetaminophen, which is Tylenol. Too much acetaminophen (Tylenol) can be harmful. · Avoid citrus fruit, tomatoes, and other foods that contain acid. · Use over-the-counter ointments to numb sore areas in the mouth or on the lips. These include Orajel and Anbesol. · Do not kiss or have oral sex with anyone while you have a cold sore. To prevent cold sores in the future  · Avoid long exposure of your lips to the sun. (Wear a hat to help shade your mouth.)  · Do not kiss or have oral sex with someone who has a cold sore. Do not have sex or oral sex with someone who has a genital herpes outbreak. · Using lip balm that contains sunscreen may help reduce outbreaks of cold sores. · Avoid foods that seem to cause your cold sores to come back. · Do not share towels, razors, silverware, toothbrushes, or other objects with a person who has a cold sore. When should you call for help? Call your doctor now or seek immediate medical care if:  · Your symptoms are painful and you want to try antiviral medicine. · You have signs of infection, such as:  ¨ Increased pain, swelling, warmth, or redness. ¨ Red streaks leading from a cold sore. ¨ Pus draining from a cold sore. ¨ A fever. · You have a cold sore and develop eye pain, eye discharge, or any changes in your vision. Watch closely for changes in your health, and be sure to contact your doctor if:  · The cold sore does not heal in 7 to 10 days. · You get cold sores often. Where can you learn more? Go to http://elena-jason.info/. Enter G149 in the search box to learn more about \"Cold Sores: Care Instructions. \"  Current as of: March 20, 2017  Content Version: 11.3  © 7092-3632 [employer].  Care instructions adapted under license by [employer] (which disclaims liability or warranty for this information). If you have questions about a medical condition or this instruction, always ask your healthcare professional. [address] any warranty or liability for your use of this information.

## 2017-10-11 RX ORDER — ALPRAZOLAM 0.5 MG/1
0.5 TABLET ORAL
Qty: 30 TAB | Refills: 0 | Status: SHIPPED | OUTPATIENT
Start: 2017-10-11 | End: 2017-12-01 | Stop reason: SDUPTHER

## 2017-12-01 RX ORDER — ALPRAZOLAM 0.5 MG/1
TABLET ORAL
Qty: 30 TAB | Refills: 0 | Status: SHIPPED | OUTPATIENT
Start: 2017-12-01 | End: 2018-01-10 | Stop reason: SDUPTHER

## 2017-12-30 NOTE — PROGRESS NOTES
GREAT NEWS!! All of your recent lab tests are normal or at goal except the LDL( The LDL is slightly elevated; LDL goal<130,HDL goal>45, Triglyceride goal<150). Continue to work on weight control and decreased saturated fat in your diet( will talk about the The American Heart Association Heart Risk Calculation( a new way to calculate your risk of a stroke or heart attack in the future) at her next annual wellness visit as most women have a risk > 7.5 % near the age of 71 if they have no other risk factors.

## 2018-02-20 DIAGNOSIS — F41.9 ANXIETY DISORDER, UNSPECIFIED TYPE: ICD-10-CM

## 2018-02-20 RX ORDER — ALPRAZOLAM 0.5 MG/1
TABLET ORAL
Qty: 30 TAB | Refills: 0 | Status: SHIPPED | OUTPATIENT
Start: 2018-02-20 | End: 2018-03-20 | Stop reason: SDUPTHER

## 2018-03-06 RX ORDER — CITALOPRAM 40 MG/1
TABLET, FILM COATED ORAL
Qty: 90 TAB | Refills: 1 | Status: SHIPPED | OUTPATIENT
Start: 2018-03-06 | End: 2018-08-30 | Stop reason: SDUPTHER

## 2018-03-20 DIAGNOSIS — F41.9 ANXIETY DISORDER, UNSPECIFIED TYPE: ICD-10-CM

## 2018-03-20 RX ORDER — ALPRAZOLAM 0.5 MG/1
TABLET ORAL
Qty: 30 TAB | Refills: 0 | Status: SHIPPED | OUTPATIENT
Start: 2018-03-20 | End: 2018-04-19 | Stop reason: SDUPTHER

## 2018-04-19 ENCOUNTER — HOSPITAL ENCOUNTER (OUTPATIENT)
Dept: LAB | Age: 68
Discharge: HOME | End: 2018-04-19
Payer: MEDICARE

## 2018-04-19 ENCOUNTER — OFFICE VISIT (OUTPATIENT)
Dept: FAMILY MEDICINE CLINIC | Age: 68
End: 2018-04-19

## 2018-04-19 VITALS
BODY MASS INDEX: 24.8 KG/M2 | HEIGHT: 63 IN | DIASTOLIC BLOOD PRESSURE: 73 MMHG | TEMPERATURE: 98.4 F | WEIGHT: 140 LBS | OXYGEN SATURATION: 98 % | SYSTOLIC BLOOD PRESSURE: 119 MMHG | RESPIRATION RATE: 18 BRPM | HEART RATE: 76 BPM

## 2018-04-19 DIAGNOSIS — Z11.59 NEED FOR HEPATITIS C SCREENING TEST: ICD-10-CM

## 2018-04-19 DIAGNOSIS — E78.5 HYPERLIPIDEMIA, UNSPECIFIED HYPERLIPIDEMIA TYPE: Primary | ICD-10-CM

## 2018-04-19 DIAGNOSIS — Z23 NEED FOR VACCINATION WITH 13-POLYVALENT PNEUMOCOCCAL CONJUGATE VACCINE: ICD-10-CM

## 2018-04-19 DIAGNOSIS — Z87.891 FORMER SMOKER: ICD-10-CM

## 2018-04-19 DIAGNOSIS — E78.5 HYPERLIPIDEMIA LDL GOAL <130: ICD-10-CM

## 2018-04-19 DIAGNOSIS — B00.1 FEVER BLISTER: Chronic | ICD-10-CM

## 2018-04-19 DIAGNOSIS — E55.9 VITAMIN D DEFICIENCY: ICD-10-CM

## 2018-04-19 DIAGNOSIS — R31.1 BENIGN MICROSCOPIC HEMATURIA: Chronic | ICD-10-CM

## 2018-04-19 DIAGNOSIS — F41.8 DEPRESSION WITH ANXIETY: ICD-10-CM

## 2018-04-19 DIAGNOSIS — F41.9 ANXIETY DISORDER, UNSPECIFIED TYPE: ICD-10-CM

## 2018-04-19 DIAGNOSIS — Z00.00 MEDICARE ANNUAL WELLNESS VISIT, SUBSEQUENT: ICD-10-CM

## 2018-04-19 PROCEDURE — 82306 VITAMIN D 25 HYDROXY: CPT

## 2018-04-19 PROCEDURE — 85027 COMPLETE CBC AUTOMATED: CPT

## 2018-04-19 PROCEDURE — 86803 HEPATITIS C AB TEST: CPT

## 2018-04-19 PROCEDURE — 36415 COLL VENOUS BLD VENIPUNCTURE: CPT

## 2018-04-19 PROCEDURE — 80061 LIPID PANEL: CPT

## 2018-04-19 PROCEDURE — 80053 COMPREHEN METABOLIC PANEL: CPT

## 2018-04-19 RX ORDER — ALPRAZOLAM 0.5 MG/1
TABLET ORAL
Qty: 30 TAB | Refills: 5 | Status: SHIPPED | OUTPATIENT
Start: 2018-04-19 | End: 2018-11-04 | Stop reason: SDUPTHER

## 2018-04-19 NOTE — PROGRESS NOTES
Chief Complaint   [facility]   [facility]         Cholesterol Problem    Anxiety    Depression     1. Have you been to the ER, urgent care clinic since your last visit? Hospitalized since your last visit? No    2. Have you seen or consulted any other health care providers outside of the [facility] since your last visit? Include any pap smears or colon screening.  No    Flu shot - declined

## 2018-04-19 NOTE — LETTER
5/25/2018 8:24 AM 
 
Ms. Aaron Dimas [phone] Dear Aaron Dimas: Please find your most recent results below. Resulted Orders METABOLIC PANEL, COMPREHENSIVE Result Value Ref Range Glucose 85 65 - 99 mg/dL BUN 17 8 - 27 mg/dL Creatinine 0.65 0.57 - 1.00 mg/dL GFR est non-AA 92 >59 mL/min/1.73 GFR est  >59 mL/min/1.73  
 BUN/Creatinine ratio 26 12 - 28 Sodium 143 134 - 144 mmol/L Potassium 4.3 3.5 - 5.2 mmol/L Chloride 103 96 - 106 mmol/L  
 CO2 25 18 - 29 mmol/L Calcium 9.8 8.7 - 10.3 mg/dL Protein, total 6.8 6.0 - 8.5 g/dL Albumin 4.5 3.6 - 4.8 g/dL GLOBULIN, TOTAL 2.3 1.5 - 4.5 g/dL A-G Ratio 2.0 1.2 - 2.2 Bilirubin, total 0.3 0.0 - 1.2 mg/dL Alk. phosphatase 64 39 - 117 IU/L  
 AST (SGOT) 12 0 - 40 IU/L  
 ALT (SGPT) 10 0 - 32 IU/L Narrative Performed at:  [address]  [id number] : Alexis Espinoza MD, Phone:  [phone] LIPID PANEL Result Value Ref Range Cholesterol, total 226 (H) 100 - 199 mg/dL Triglyceride 111 0 - 149 mg/dL HDL Cholesterol 68 >39 mg/dL VLDL, calculated 22 5 - 40 mg/dL LDL, calculated 136 (H) 0 - 99 mg/dL Narrative Performed at:  [address]  [id number] : Alexis Espinoza MD, Phone:  [phone] CBC W/O DIFF Result Value Ref Range WBC 8.2 3.4 - 10.8 x10E3/uL  
 RBC 4.57 3.77 - 5.28 x10E6/uL HGB 14.0 11.1 - 15.9 g/dL HCT 42.0 34.0 - 46.6 % MCV 92 79 - 97 fL  
 MCH 30.6 26.6 - 33.0 pg  
 MCHC 33.3 31.5 - 35.7 g/dL  
 RDW 14.3 12.3 - 15.4 % PLATELET 235 (H) 131 - 379 x10E3/uL Narrative Performed at:  [address]  [id number] : Alexis Espinoza MD, Phone:  [phone] VITAMIN D, 25 HYDROXY Result Value Ref Range VITAMIN D, 25-HYDROXY 18.8 (L) 30.0 - 100.0 ng/mL Comment:  
   Vitamin D deficiency has been defined by the [facility]9 Merged with [facility] practice guideline as a 
level of serum 25-OH vitamin D less than 20 ng/mL (1,2). The Endocrine Society went on to further define vitamin D 
insufficiency as a level between 21 and 29 ng/mL (2). 1. IOM ([place] of Medicine). 2010. Dietary reference 
   intakes for calcium and D. [facility]: The 
   [employer]. 2. Jessica MF, Nini NC, Marilu HORNER, et al. 
   Evaluation, treatment, and prevention of vitamin D 
   deficiency: an Endocrine Society clinical practice 
   guideline. JCEM. 2011 Jul; 96(7):1911-30. Narrative Performed at:  [address]  [id number] : Vitaly Garcia MD, Phone:  [phone] HEPATITIS C AB Result Value Ref Range Hep C Virus Ab <0.1 0.0 - 0.9 s/co ratio Comment:  
                                     Negative:     < 0.8 Indeterminate: 0.8 - 0.9 Positive:     > 0.9 The CDC recommends that a positive HCV antibody result 
 be followed up with a HCV Nucleic Acid Amplification 
 test (736347). Narrative Performed at:  [address]  [id number] : Vitaly Garcia MD, Phone:  [phone] CVD REPORT Result Value Ref Range INTERPRETATION Note Comment:  
   Supplemental report is available. Narrative Performed at:  [address] [place] [address]  [id number] : David Zarate PhD, Phone:  [phone] RECOMMENDATIONS: 
GREAT NEWS!! All of your recent lab tests are normal or at goal. I would continue everything the same and schedule your next fasting office visit in 6 months.  In addition, a physical is recommended every year after the age of 61. The American Heart Association Heart Risk Calculation( a new way to calculate your risk of a stroke or heart attack in the future) shows your risk of a heart attack or stroke in the next 10 years; a statin is recommended if that # is > 7.5 %. Your risk is 5.9 but by age 79 it will be > 7.5% at 8.2%. At that point a medication called a stattin will be needed to lower your risk of a heart attack or stroke Please call me if you have any questions: [phone] Sincerely, 
 
 
Hosea Zuñiga MD

## 2018-04-19 NOTE — MR AVS SNAPSHOT
[address] [facility] 
 
 
 [address] 
[phone] Patient: Unruly Salinas MRN: [id number] Visit Information Date & Time Provider Department Dept. Phone Encounter #  
 4/19/2018 10:45 AM Nyasia Dent  [facility] [phone] [id number] Your Appointments 10/22/2018 10:45 AM  
ROUTINE CARE with Nyasia Dent MD  
[facility]) Appt Note: 6 month follow up  
 [address] [address]  
[phone]  
  
   
 [address] [address] Upcoming Health Maintenance Date Due Hepatitis C Screening 1950 Influenza Age 5 to Adult 8/1/2017 Pneumococcal 65+ Low/Medium Risk (2 of 2 - PPSV23) 2/14/2018 MEDICARE YEARLY EXAM 3/14/2018 BREAST CANCER SCRN MAMMOGRAM 2/9/2019 GLAUCOMA SCREENING Q2Y 12/8/2019 COLONOSCOPY 11/2/2020 DTaP/Tdap/Td series (2 - Td) 11/18/2020 Allergies as of 4/19/2018  Review Complete On: 4/19/2018 By: Kavin Wang LPN Severity Noted Reaction Type Reactions Wellbutrin [Bupropion]  03/11/2010    Unable to Obtain Current Immunizations  Reviewed on 9/16/2013 Name Date TDAP Vaccine 11/18/2010 Not reviewed this visit You Were Diagnosed With   
  
 Codes Comments Hyperlipidemia, unspecified hyperlipidemia type    -  Primary ICD-10-CM: E78.5 ICD-9-CM: 272.4 Anxiety disorder, unspecified type     ICD-10-CM: F41.9 ICD-9-CM: 300.00 Benign microscopic hematuria     ICD-10-CM: R31.1 ICD-9-CM: 599.72 Depression with anxiety     ICD-10-CM: F41.8 ICD-9-CM: 300.4 Need for hepatitis C screening test     ICD-10-CM: Z11.59 
ICD-9-CM: V73.89 Need for vaccination with 13-polyvalent pneumococcal conjugate vaccine     ICD-10-CM: Y91 ICD-9-CM: V03.82 Vitals BP Pulse Temp Resp Height(growth percentile) Weight(growth percentile) 119/73 (BP 1 Location: Left arm, BP Patient Position: Sitting) 76 98.4 °F (36.9 °C) (Oral) 18 5' 3\" (1.6 m) 140 lb (63.5 kg) LMP SpO2 BMI OB Status Smoking Status 2000 98% 24.8 kg/m2 Postmenopausal Former Smoker Vitals History BMI and BSA Data Body Mass Index Body Surface Area  
 24.8 kg/m 2 1.68 m 2 Preferred Pharmacy Pharmacy Name Phone [facility] [place] [facility] [phone] Your Updated Medication List  
  
   
This list is accurate as of 18 12:38 PM.  Always use your most recent med list.  
  
  
  
  
 ALPRAZolam 0.5 mg tablet Commonly known as:  XANAX  
TAKE 1 TABLET BY MOUTH ONCE DAILY IN THE EVENING FOR ANXIETY  
  
 citalopram 40 mg tablet Commonly known as:  CELEXA  
TAKE ONE TABLET BY MOUTH ONCE DAILY  
  
 naproxen 250 mg tablet Commonly known as:  NAPROSYN Take  by mouth two (2) times daily (with meals). pneumococcal 13 bhavesh conj dip 0.5 mL Syrg injection Commonly known as:  PREVNAR-13  
0.5 mL by IntraMUSCular route once for 1 dose. valACYclovir 500 mg tablet Commonly known as:  VALTREX Take 1 Tab by mouth two (2) times a day. For 1 week, then daily to prevent Prescriptions Printed Refills ALPRAZolam (XANAX) 0.5 mg tablet 5 Sig: TAKE 1 TABLET BY MOUTH ONCE DAILY IN THE EVENING FOR ANXIETY Class: Print  
 pneumococcal 13 bhavesh conj dip (PREVNAR-13) 0.5 mL syrg injection 0 Si.5 mL by IntraMUSCular route once for 1 dose. Class: Print Route: IntraMUSCular We Performed the Following CBC W/O DIFF [97721 CPT(R)] HEPATITIS C AB [10466 CPT(R)] LIPID PANEL [74117 CPT(R)] METABOLIC PANEL, COMPREHENSIVE [65621 CPT(R)] VITAMIN D, 25 HYDROXY [id number] CPT(R)] Introducing [facility]!    
 Gayle Carreon introduces myfab5 patient portal. Now you can access parts of your medical record, email your doctor's office, and request medication refills online. 1. In your internet browser, go to [email or web address] 2. Click on the First Time User? Click Here link in the Sign In box. You will see the New Member Sign Up page. 3. Enter your LayerGloss Access Code exactly as it appears below. You will not need to use this code after youve completed the sign-up process. If you do not sign up before the expiration date, you must request a new code. · LayerGloss Access Code: [email or web address] Expires: 7/18/2018 10:38 AM 
 
4. Enter the last four digits of your Social Security Number (xxxx) and Date of Birth (mm/dd/yyyy) as indicated and click Submit. You will be taken to the next sign-up page. 5. Create a LayerGloss ID. This will be your LayerGloss login ID and cannot be changed, so think of one that is secure and easy to remember. 6. Create a LayerGloss password. You can change your password at any time. 7. Enter your Password Reset Question and Answer. This can be used at a later time if you forget your password. 8. Enter your e-mail address. You will receive e-mail notification when new information is available in [address] 9. Click Sign Up. You can now view and download portions of your medical record. 10. Click the Download Summary menu link to download a portable copy of your medical information. If you have questions, please visit the Frequently Asked Questions section of the LayerGloss website. Remember, LayerGloss is NOT to be used for urgent needs. For medical emergencies, dial 911. Now available from your iPhone and Android! Please provide this summary of care documentation to your next provider. Your primary care clinician is listed as [address] [address], [facility]/s . If you have any questions after today's visit, please call [phone].

## 2018-04-19 NOTE — PROGRESS NOTES
HISTORY OF PRESENT ILLNESS  HPI  Obdulia Dubois is a 79 y.o. Female with a history of depression with anxiety, hyperlipidemia and pre-diabetes, who presents at the office today for a follow up on her Xanax. Pt is not fasting today, but has only had coffee with half and half with sugar. Pt is experiencing relief from cold sores while taking Valtrex 500 mg BID. Pt has not had either of her pneumonia shots. Pt has not been tested for Hep C. Past Medical History:   Diagnosis Date    Anxiety     Benign microscopic hematuria- most recent w/u neg in 2016 2/14/2017    Depression     Fever blister 4/19/2018    mainly upper lip, chronic    Former smoker 3/11/2010    Hyperlipidemia 4/14/2014    Leg cramps 3/11/2010    Postmenopausal HRT (hormone replacement therapy) 3/11/2010    stopped 2010     Past Surgical History:   Procedure Laterality Date    HX BREAST BIOPSY Right yrs ago    neg; u/s guided    HX GI      hemmorhoidectomy, fissure     Current Outpatient Prescriptions on File Prior to Visit   Medication Sig Dispense Refill    citalopram (CELEXA) 40 mg tablet TAKE ONE TABLET BY MOUTH ONCE DAILY 90 Tab 1    valACYclovir (VALTREX) 500 mg tablet Take 1 Tab by mouth two (2) times a day. For 1 week, then daily to prevent 90 Tab 3    naproxen (NAPROSYN) 250 mg tablet Take  by mouth two (2) times daily (with meals). No current facility-administered medications on file prior to visit.       Allergies   Allergen Reactions    Wellbutrin [Bupropion] Unable to Obtain     Family History   Problem Relation Age of Onset    Stroke Mother     Cancer Father      larynx    Cancer Sister      BRCA    Breast Cancer Sister 48    Diabetes Brother     Diabetes Sister     Psychiatric Disorder Sister      Heike Leyva Breast Cancer Niece     Heart Attack Maternal Grandmother      Social History     Social History    Marital status:      Spouse name: N/A    Number of children: N/A    Years of education: N/A     Social History Main Topics    Smoking status: Former Smoker     Packs/day: 1.00     Years: 30.00     Types: Cigarettes     Quit date: 1/1/2005    Smokeless tobacco: Never Used    Alcohol use 0.0 oz/week     0 Standard drinks or equivalent per week      Comment: ocassional    Drug use: No    Sexual activity: Yes     Partners: Male     Other Topics Concern    None     Social History Narrative             Review of Systems   Constitutional: Negative for chills, diaphoresis, fever, malaise/fatigue and weight loss. Eyes: Negative for blurred vision, double vision, pain and redness. Respiratory: Negative for cough, shortness of breath and wheezing. Cardiovascular: Negative for chest pain, palpitations, orthopnea, claudication, leg swelling and PND. Skin: Negative for itching and rash. Neurological: Negative for dizziness, tingling, tremors, sensory change, speech change, focal weakness, seizures, loss of consciousness, weakness and headaches. Results for orders placed or performed in visit on 02/14/17   LIPID PANEL   Result Value Ref Range    Cholesterol, total 215 (H) 100 - 199 mg/dL    Triglyceride 66 0 - 149 mg/dL    HDL Cholesterol 70 >39 mg/dL    VLDL, calculated 13 5 - 40 mg/dL    LDL, calculated 132 (H) 0 - 99 mg/dL   METABOLIC PANEL, COMPREHENSIVE   Result Value Ref Range    Glucose 89 65 - 99 mg/dL    BUN 15 8 - 27 mg/dL    Creatinine 0.66 0.57 - 1.00 mg/dL    GFR est non-AA 92 >59 mL/min/1.73    GFR est  >59 mL/min/1.73    BUN/Creatinine ratio 23 11 - 26    Sodium 142 134 - 144 mmol/L    Potassium 4.1 3.5 - 5.2 mmol/L    Chloride 102 96 - 106 mmol/L    CO2 24 18 - 29 mmol/L    Calcium 9.6 8.7 - 10.3 mg/dL    Protein, total 6.7 6.0 - 8.5 g/dL    Albumin 4.5 3.6 - 4.8 g/dL    GLOBULIN, TOTAL 2.2 1.5 - 4.5 g/dL    A-G Ratio 2.0 1.1 - 2.5    Bilirubin, total 0.3 0.0 - 1.2 mg/dL    Alk.  phosphatase 57 39 - 117 IU/L    AST (SGOT) 19 0 - 40 IU/L    ALT (SGPT) 16 0 - 32 IU/L CBC W/O DIFF   Result Value Ref Range    WBC 7.1 3.4 - 10.8 x10E3/uL    RBC 4.61 3.77 - 5.28 x10E6/uL    HGB 13.8 11.1 - 15.9 g/dL    HCT 40.9 34.0 - 46.6 %    MCV 89 79 - 97 fL    MCH 29.9 26.6 - 33.0 pg    MCHC 33.7 31.5 - 35.7 g/dL    RDW 14.4 12.3 - 15.4 %    PLATELET 306 104 - 673 x10E3/uL   CVD REPORT   Result Value Ref Range    INTERPRETATION Note            Physical Exam  Visit Vitals    /73 (BP 1 Location: Left arm, BP Patient Position: Sitting)    Pulse 76    Temp 98.4 °F (36.9 °C) (Oral)    Resp 18    Ht 5' 3\" (1.6 m)    Wt 140 lb (63.5 kg)    LMP 01/01/2000    SpO2 98%    BMI 24.8 kg/m2     Head: Normocephalic, without obvious abnormality, atraumatic  Eyes: conjunctivae/corneas clear. PERRL, EOM's intact. Neck: supple, symmetrical, trachea midline, no adenopathy, thyroid: not enlarged, symmetric, no tenderness/mass/nodules, no carotid bruit and no JVD  Lungs: clear to auscultation bilaterally  Heart: regular rate and rhythm, S1, S2 normal, no murmur, click, rub or gallop  Extremities: extremities normal, atraumatic, no cyanosis or edema  Pulses: 2+ and symmetric  Lymph nodes: Cervical, supraclavicular, and axillary nodes normal.  Neurologic: Grossly normal      ASSESSMENT and PLAN    ICD-10-CM ICD-9-CM    1. Hyperlipidemia, unspecified hyperlipidemia type X20.0 547.8 METABOLIC PANEL, COMPREHENSIVE      LIPID PANEL      CBC W/O DIFF   2. Anxiety disorder, unspecified type F41.9 300.00 ALPRAZolam (XANAX) 0.5 mg tablet   3. Benign microscopic hematuria- most recent w/u neg in 2016 R31.1 599.72    4. Depression with anxiety F41.8 300.4    5. Need for hepatitis C screening test Z11.59 V73.89 HEPATITIS C AB   6. Need for vaccination with 13-polyvalent pneumococcal conjugate vaccine Z23 V03.82 pneumococcal 13 bhavesh conj dip (PREVNAR-13) 0.5 mL syrg injection   7. Fever blister B00.1 054.9     mainly upper lip, chronic   8.  Medicare annual wellness visit, subsequent W67.86 V31.6 METABOLIC PANEL, COMPREHENSIVE      LIPID PANEL      CBC W/O DIFF      HEPATITIS C AB   9. Hyperlipidemia LDL goal <130 E78.5 272.4 LIPID PANEL   10. Vitamin D deficiency E55.9 268.9 VITAMIN D, 25 HYDROXY   11. Former smoker Z87.891 V15.82      Diagnoses and all orders for this visit:    1. Hyperlipidemia, unspecified hyperlipidemia type  -     METABOLIC PANEL, COMPREHENSIVE  -     LIPID PANEL  -     CBC W/O DIFF    2. Anxiety disorder, unspecified type  -     ALPRAZolam (XANAX) 0.5 mg tablet; TAKE 1 TABLET BY MOUTH ONCE DAILY IN THE EVENING FOR ANXIETY    3. Benign microscopic hematuria- most recent w/u neg in 2016    4. Depression with anxiety    5. Need for hepatitis C screening test  -     HEPATITIS C AB    6. Need for vaccination with 13-polyvalent pneumococcal conjugate vaccine  -     pneumococcal 13 bhavesh conj dip (PREVNAR-13) 0.5 mL syrg injection; 0.5 mL by IntraMUSCular route once for 1 dose. 7. Fever blister  Comments:  mainly upper lip, chronic    8. Medicare annual wellness visit, subsequent  -     METABOLIC PANEL, COMPREHENSIVE  -     LIPID PANEL  -     CBC W/O DIFF  -     HEPATITIS C AB    9. Hyperlipidemia LDL goal <130  -     LIPID PANEL    10. Vitamin D deficiency  -     VITAMIN D, 25 HYDROXY    11. Former smoker      Follow-up Disposition:  Return in about 6 months (around 10/19/2018), or exertional CP or SOB, for F/U cholesterol, f/u Vitamin D deficiency. lab results and schedule of future lab studies reviewed with patient  reviewed diet, exercise and weight control  cardiovascular risk and specific lipid/LDL goals reviewed  reviewed medications and side effects in detail  Please call my office if there are any questions- [phone]. I will arrange for follow up after the lab tests done from today return  Recommended a weekly \"heart check. \" I went into detail how to do this. Call for refills on medications as needed. Discussed expected course/resolution/complications of diagnosis in detail with patient. Medication risks/benefits/costs/interactions/alternatives discussed with patient. Pt was given an after visit summary which includes diagnoses, current medications & vitals. Pt expressed understanding with the diagnosis and plan. Total 45 minutes,60 % counseling re:   Reviewed symptoms, or lack thereof, of  elevated cholesterol. Discussed the American Heart Association Heart Risk Calculation( a new way to calculate your risk of a stroke or heart attack in the future) shows your risk of a heart attack or stroke in the next 10 years; a statin is recommended if that # is > 7.5 %. Weight loss, diet, and exercise lower it enough to avoid medication for a few years only. This new formula has men at 72 and women at 66-77 on average requiring a statin to reduce the age related risk of having a stroke or heart attack, even with no other health problems, including normal cholesterol numbers. I will calculate your risk after your cholesterol numbers return. It has become clear from recent studies, that your risk of death from a stroke or heart attack is reduced significantly with a group of medications called statins which in the past have been known as \"cholesterol medications. \"      Encouraged pt to continue Valtrex for prevention of fever blisters. Family history is positive for diabetes, so we will schedule a 2 hour PP glucose test once her labs come back. Discussed the 2 shingles vaccines, positive and negatives of each and in general recommended the newer one- Shingrix due to efficacy. Wait at least six months after the pneumonia vaccine. Advance Directive information and NN contact information given. Also, discussed symptoms of concern that were noted today in the note above, treatment options( including doing nothing), when to follow up before recommended time frame. Also, answered all questions.     This document was written by Viry Chan, as dictated by Ruddy Woods MD.  I have reviewed and agree with the above note and have made corrections where appropriate Noé Garcia M.D.

## 2018-04-20 LAB
25(OH)D3+25(OH)D2 SERPL-MCNC: 18.8 NG/ML (ref 30–100)
ALBUMIN SERPL-MCNC: 4.5 G/DL (ref 3.6–4.8)
ALBUMIN/GLOB SERPL: 2 {RATIO} (ref 1.2–2.2)
ALP SERPL-CCNC: 64 IU/L (ref 39–117)
ALT SERPL-CCNC: 10 IU/L (ref 0–32)
AST SERPL-CCNC: 12 IU/L (ref 0–40)
BILIRUB SERPL-MCNC: 0.3 MG/DL (ref 0–1.2)
BUN SERPL-MCNC: 17 MG/DL (ref 8–27)
BUN/CREAT SERPL: 26 (ref 12–28)
CALCIUM SERPL-MCNC: 9.8 MG/DL (ref 8.7–10.3)
CHLORIDE SERPL-SCNC: 103 MMOL/L (ref 96–106)
CHOLEST SERPL-MCNC: 226 MG/DL (ref 100–199)
CO2 SERPL-SCNC: 25 MMOL/L (ref 18–29)
CREAT SERPL-MCNC: 0.65 MG/DL (ref 0.57–1)
ERYTHROCYTE [DISTWIDTH] IN BLOOD BY AUTOMATED COUNT: 14.3 % (ref 12.3–15.4)
GFR SERPLBLD CREATININE-BSD FMLA CKD-EPI: 106 ML/MIN/1.73
GFR SERPLBLD CREATININE-BSD FMLA CKD-EPI: 92 ML/MIN/1.73
GLOBULIN SER CALC-MCNC: 2.3 G/DL (ref 1.5–4.5)
GLUCOSE SERPL-MCNC: 85 MG/DL (ref 65–99)
HCT VFR BLD AUTO: 42 % (ref 34–46.6)
HCV AB S/CO SERPL IA: <0.1 S/CO RATIO (ref 0–0.9)
HDLC SERPL-MCNC: 68 MG/DL
HGB BLD-MCNC: 14 G/DL (ref 11.1–15.9)
INTERPRETATION, 910389: NORMAL
LDLC SERPL CALC-MCNC: 136 MG/DL (ref 0–99)
MCH RBC QN AUTO: 30.6 PG (ref 26.6–33)
MCHC RBC AUTO-ENTMCNC: 33.3 G/DL (ref 31.5–35.7)
MCV RBC AUTO: 92 FL (ref 79–97)
PLATELET # BLD AUTO: 393 X10E3/UL (ref 150–379)
POTASSIUM SERPL-SCNC: 4.3 MMOL/L (ref 3.5–5.2)
PROT SERPL-MCNC: 6.8 G/DL (ref 6–8.5)
RBC # BLD AUTO: 4.57 X10E6/UL (ref 3.77–5.28)
SODIUM SERPL-SCNC: 143 MMOL/L (ref 134–144)
TRIGL SERPL-MCNC: 111 MG/DL (ref 0–149)
VLDLC SERPL CALC-MCNC: 22 MG/DL (ref 5–40)
WBC # BLD AUTO: 8.2 X10E3/UL (ref 3.4–10.8)

## 2018-04-20 NOTE — PROGRESS NOTES
Isabelle Roland is a 79 y.o. female and presents for annual Medicare Wellness Visit. Problem List: Reviewed with patient and discussed risk factors. Patient Active Problem List   Diagnosis Code    Former smoker Z87.891    Postmenopausal HRT (hormone replacement therapy) Z79.890    Leg cramps R25.2    Depression with anxiety F41.8    Hyperlipidemia E78.5    Pre-diabetes R73.03    Benign microscopic hematuria- most recent w/u neg in 2016 R31.1    Fever blister B00.1       Current medical providers:  Patient Care Team:  Hosea Zuñiga MD as PCP - General    PSH: Reviewed with patient  Past Surgical History:   Procedure Laterality Date    HX BREAST BIOPSY Right yrs ago    neg; u/s guided    HX GI      hemmorhoidectomy, fissure        SH: Reviewed with patient  Social History   Substance Use Topics    Smoking status: Former Smoker     Packs/day: 1.00     Years: 30.00     Types: Cigarettes     Quit date: 1/1/2005    Smokeless tobacco: Never Used    Alcohol use 0.0 oz/week     0 Standard drinks or equivalent per week      Comment: ocassional       FH: Reviewed with patient  Family History   Problem Relation Age of Onset    Stroke Mother     Cancer Father      larynx    Cancer Sister      BRCA    Breast Cancer Sister 48    Diabetes Brother     Diabetes Sister     Psychiatric Disorder Sister      Raquel Patel Breast Cancer Niece     Heart Attack Maternal Grandmother        Medications/Allergies: Reviewed with patient  Current Outpatient Prescriptions on File Prior to Visit   Medication Sig Dispense Refill    citalopram (CELEXA) 40 mg tablet TAKE ONE TABLET BY MOUTH ONCE DAILY 90 Tab 1    valACYclovir (VALTREX) 500 mg tablet Take 1 Tab by mouth two (2) times a day. For 1 week, then daily to prevent 90 Tab 3    naproxen (NAPROSYN) 250 mg tablet Take  by mouth two (2) times daily (with meals). No current facility-administered medications on file prior to visit.        Allergies   Allergen Reactions    Wellbutrin [Bupropion] Unable to Obtain       Objective:  Visit Vitals    /73 (BP 1 Location: Left arm, BP Patient Position: Sitting)    Pulse 76    Temp 98.4 °F (36.9 °C) (Oral)    Resp 18    Ht 5' 3\" (1.6 m)    Wt 140 lb (63.5 kg)    LMP 01/01/2000    SpO2 98%    BMI 24.8 kg/m2    Body mass index is 24.8 kg/(m^2). Assessment of cognitive impairment: Alert and oriented x 3    Depression Screen:   PHQ over the last two weeks 4/19/2018   Little interest or pleasure in doing things Not at all   Feeling down, depressed or hopeless Not at all   Total Score PHQ 2 0       Fall Risk Assessment:    Fall Risk Assessment, last 12 mths 4/19/2018   Able to walk? Yes   Fall in past 12 months? No   Fall with injury? -   Number of falls in past 12 months -   Fall Risk Score -       Functional Ability:   Does the patient exhibit a steady gait? yes   How long did it take the patient to get up and walk from a sitting position? 4-5 seconds   Is the patient self reliant?  (ie can do own laundry, meals, household chores)  yes     Does the patient handle his/her own medications? yes     Does the patient handle his/her own money? yes     Is the patients home safe (ie good lighting, handrails on stairs and bath, etc.)? yes     Did you notice or did patient express any hearing difficulties? no     Did you notice or did patient express any vision difficulties?   no     Were distance and reading eye charts used? Yes. Patient's  full eye exam by an ophthalmologist every 2 years is unremarkable: no glaucoma or macular degeneration.          Advance Care Planning:   Patient was offered the opportunity to discuss advance care planning:  yes     Does patient have an Advance Directive:  No. I reviewed advanced directive information and written information given to patient; patient to make follow up appointment with a NN for any questions,to review choices made for health care, agent, and anatomical gifts that are on the advanced directive at home. If no, did you provide information on [employer]ctions? yes       Plan:      Orders Placed This Encounter    METABOLIC PANEL, COMPREHENSIVE    LIPID PANEL    CBC W/O DIFF    VITAMIN D, 25 HYDROXY    HEPATITIS C AB    ALPRAZolam (XANAX) 0.5 mg tablet    pneumococcal 13 bhavesh conj dip (PREVNAR-13) 0.5 mL syrg injection       Health Maintenance   Topic Date Due    Hepatitis C Screening  1950    Influenza Age 5 to Adult  08/01/2017    Pneumococcal 65+ Low/Medium Risk (2 of 2 - PPSV23) 02/14/2018    MEDICARE YEARLY EXAM  03/14/2018    BREAST CANCER SCRN MAMMOGRAM  02/09/2019    GLAUCOMA SCREENING Q2Y  12/08/2019    COLONOSCOPY  11/02/2020    DTaP/Tdap/Td series (2 - Td) 11/18/2020    Bone Densitometry (Dexa) Screening  Completed    ZOSTER VACCINE AGE 60>  Addressed       *Patient verbalized understanding and agreement with the plan. A copy of the After Visit Summary with personalized health plan was given to the patient today.

## 2018-04-20 NOTE — ACP (ADVANCE CARE PLANNING)
Advance Care Planning (ACP) Provider Note - Comprehensive     Date of ACP Conversation: 04/20/18  Persons included in Conversation:  patient  Length of ACP Conversation in minutes:  <16 minutes (Non-Billable)    Authorized Decision Maker (if patient is incapable of making informed decisions): This person is:  Healthcare Agent/Medical Power of  under Advance Directive          General ACP for ALL Patients with Decision Making Capacity:   Importance of advance care planning, including choosing a healthcare agent to communicate patient's healthcare decisions if patient lost the ability to make decisions, such as after a sudden illness or accident  Understanding of the healthcare agent role was assessed and information provided    Review of Existing Advance Directive:       For Serious or Chronic Illness:  No known illness    Interventions Provided:  Recommended completion of Advance Directive form after review of ACP materials and conversation with prospective healthcare agent   Recommended communicating the plan and making copies for the healthcare agent, personal physician, and others as appropriate (e.g., health system)  Recommended review of completed ACP document annually or upon change in health status

## 2018-05-22 ENCOUNTER — TELEPHONE (OUTPATIENT)
Dept: FAMILY MEDICINE CLINIC | Age: 68
End: 2018-05-22

## 2018-05-22 NOTE — TELEPHONE ENCOUNTER
Patient is calling, she is requesting that her most recent labs be mailed to her, address on file was confirmed with the patient.      Best call back # for patient if needed: [phone]

## 2018-05-25 NOTE — PROGRESS NOTES
GREAT NEWS!! All of your recent lab tests are normal or at goal. I would continue everything the same and schedule your next fasting office visit in 6 months. In addition, a physical is recommended every year after the age of 61. The American Heart Association Heart Risk Calculation( a new way to calculate your risk of a stroke or heart attack in the future) shows your risk of a heart attack or stroke in the next 10 years; a statin is recommended if that # is > 7.5 %. Your risk is 5.9 but by age 79 it will be > 7.5% at 8.2%.  At that point a medication called a stattin will be needed to lower your risk of a heart attack or stroke

## 2018-08-18 DIAGNOSIS — B00.1 FEVER BLISTER: ICD-10-CM

## 2018-08-22 RX ORDER — VALACYCLOVIR HYDROCHLORIDE 500 MG/1
TABLET, FILM COATED ORAL
Qty: 90 TAB | Refills: 3 | Status: SHIPPED | OUTPATIENT
Start: 2018-08-22 | End: 2019-07-30 | Stop reason: SDUPTHER

## 2018-08-31 RX ORDER — CITALOPRAM 40 MG/1
TABLET, FILM COATED ORAL
Qty: 90 TAB | Refills: 1 | Status: SHIPPED | OUTPATIENT
Start: 2018-08-31 | End: 2019-03-02 | Stop reason: SDUPTHER

## 2018-10-22 ENCOUNTER — OFFICE VISIT (OUTPATIENT)
Dept: FAMILY MEDICINE CLINIC | Age: 68
End: 2018-10-22

## 2018-10-22 VITALS
DIASTOLIC BLOOD PRESSURE: 60 MMHG | BODY MASS INDEX: 24.31 KG/M2 | RESPIRATION RATE: 18 BRPM | TEMPERATURE: 97.6 F | HEIGHT: 63 IN | OXYGEN SATURATION: 97 % | HEART RATE: 64 BPM | SYSTOLIC BLOOD PRESSURE: 108 MMHG | WEIGHT: 137.2 LBS

## 2018-10-22 DIAGNOSIS — R31.1 BENIGN MICROSCOPIC HEMATURIA: Chronic | ICD-10-CM

## 2018-10-22 DIAGNOSIS — E55.9 VITAMIN D DEFICIENCY: ICD-10-CM

## 2018-10-22 DIAGNOSIS — E78.5 HYPERLIPIDEMIA LDL GOAL <130: ICD-10-CM

## 2018-10-22 DIAGNOSIS — B96.89 ACUTE BACTERIAL SINUSITIS: Primary | ICD-10-CM

## 2018-10-22 DIAGNOSIS — F41.8 DEPRESSION WITH ANXIETY: ICD-10-CM

## 2018-10-22 DIAGNOSIS — E78.5 HYPERLIPIDEMIA, UNSPECIFIED HYPERLIPIDEMIA TYPE: ICD-10-CM

## 2018-10-22 DIAGNOSIS — B00.1 FEVER BLISTER: ICD-10-CM

## 2018-10-22 DIAGNOSIS — Z87.891 FORMER SMOKER: ICD-10-CM

## 2018-10-22 DIAGNOSIS — J01.90 ACUTE BACTERIAL SINUSITIS: Primary | ICD-10-CM

## 2018-10-22 RX ORDER — AMOXICILLIN 500 MG/1
500 CAPSULE ORAL 3 TIMES DAILY
Qty: 30 CAP | Refills: 0 | Status: SHIPPED | OUTPATIENT
Start: 2018-10-22 | End: 2018-11-01

## 2018-10-22 NOTE — PROGRESS NOTES
HISTORY OF PRESENT ILLNESS 
HPI Margo Rendon is a 76 y.o. Female with a history of hyperlipidemia and depression with anxiety, who presents at the office today for a follow up. Pt is fasting today. Pt is taking a multivitamin for her vitamin D.  
Pt started having sinus issues since 5 days ago. Pt states that she first had symptoms of congestion, followed by productive cough and headaches. Pt has been taking Mucinex DM. Pt stopped smoking 14 years ago. Pt denies allergies for this season. Denies any unusual exertional chest pain or shortness of breath. No recent hospitalizations or ER /Pt 1st type visits. Past Medical History:  
Diagnosis Date  Anxiety  Benign microscopic hematuria- most recent w/u neg in 2016 2/14/2017  Depression  Fever blister 4/19/2018  
 mainly upper lip, chronic  Former smoker 3/11/2010  Hyperlipidemia 4/14/2014  Leg cramps 3/11/2010  Postmenopausal HRT (hormone replacement therapy) 3/11/2010  
 stopped 2010 Past Surgical History:  
Procedure Laterality Date  HX BREAST BIOPSY Right yrs ago  
 neg; u/s guided  HX GI    
 hemmorhoidectomy, fissure Current Outpatient Medications on File Prior to Visit Medication Sig Dispense Refill  citalopram (CELEXA) 40 mg tablet TAKE 1 TABLET BY MOUTH ONCE DAILY 90 Tab 1  valACYclovir (VALTREX) 500 mg tablet TAKE ONE TABLET BY MOUTH TWICE DAILY FOR  1  WEEK,  THEN  ONCE  DAILY  TO  PREVENT 90 Tab 3  
 naproxen (NAPROSYN) 250 mg tablet Take  by mouth two (2) times daily (with meals). No current facility-administered medications on file prior to visit. Allergies Allergen Reactions  Wellbutrin [Bupropion] Unable to Consolidated Dada Family History Problem Relation Age of Onset  Stroke Mother  Cancer Father   
     larynx  Cancer Sister BRCA  Breast Cancer Sister 48  Diabetes Brother  Diabetes Sister  Psychiatric Disorder Sister Yossi Davila  Breast Cancer Niece  Heart Attack Maternal Grandmother Social History Socioeconomic History  Marital status:  Spouse name: Not on file  Number of children: Not on file  Years of education: Not on file  Highest education level: Not on file Tobacco Use  Smoking status: Former Smoker Packs/day: 1.00 Years: 30.00 Pack years: 30.00 Types: Cigarettes Last attempt to quit: 2005 Years since quittin.0  Smokeless tobacco: Never Used Substance and Sexual Activity  Alcohol use: Yes Alcohol/week: 0.0 oz  
  Comment: ocassional  
 Drug use: No  
 Sexual activity: Yes  
  Partners: Male Review of Systems Constitutional: Negative for chills, diaphoresis, fever, malaise/fatigue and weight loss. HENT: Positive for congestion. Eyes: Negative for blurred vision, double vision, pain and redness. Respiratory: Positive for cough (productive) and sputum production. Negative for shortness of breath and wheezing. Cardiovascular: Negative for chest pain, palpitations, orthopnea, claudication, leg swelling and PND. Skin: Negative for itching and rash. Neurological: Positive for headaches. Negative for dizziness, tingling, tremors, sensory change, speech change, focal weakness, seizures, loss of consciousness and weakness. Results for orders placed or performed in visit on 18 METABOLIC PANEL, COMPREHENSIVE Result Value Ref Range Glucose 85 65 - 99 mg/dL BUN 17 8 - 27 mg/dL Creatinine 0.65 0.57 - 1.00 mg/dL GFR est non-AA 92 >59 mL/min/1.73 GFR est  >59 mL/min/1.73  
 BUN/Creatinine ratio 26 12 - 28 Sodium 143 134 - 144 mmol/L Potassium 4.3 3.5 - 5.2 mmol/L Chloride 103 96 - 106 mmol/L  
 CO2 25 18 - 29 mmol/L Calcium 9.8 8.7 - 10.3 mg/dL Protein, total 6.8 6.0 - 8.5 g/dL Albumin 4.5 3.6 - 4.8 g/dL GLOBULIN, TOTAL 2.3 1.5 - 4.5 g/dL A-G Ratio 2.0 1.2 - 2.2 Bilirubin, total 0.3 0.0 - 1.2 mg/dL Alk. phosphatase 64 39 - 117 IU/L  
 AST (SGOT) 12 0 - 40 IU/L  
 ALT (SGPT) 10 0 - 32 IU/L  
LIPID PANEL Result Value Ref Range Cholesterol, total 226 (H) 100 - 199 mg/dL Triglyceride 111 0 - 149 mg/dL HDL Cholesterol 68 >39 mg/dL VLDL, calculated 22 5 - 40 mg/dL LDL, calculated 136 (H) 0 - 99 mg/dL CBC W/O DIFF Result Value Ref Range WBC 8.2 3.4 - 10.8 x10E3/uL  
 RBC 4.57 3.77 - 5.28 x10E6/uL HGB 14.0 11.1 - 15.9 g/dL HCT 42.0 34.0 - 46.6 % MCV 92 79 - 97 fL  
 MCH 30.6 26.6 - 33.0 pg  
 MCHC 33.3 31.5 - 35.7 g/dL  
 RDW 14.3 12.3 - 15.4 % PLATELET 704 (H) 482 - 379 x10E3/uL VITAMIN D, 25 HYDROXY Result Value Ref Range VITAMIN D, 25-HYDROXY 18.8 (L) 30.0 - 100.0 ng/mL HEPATITIS C AB Result Value Ref Range Hep C Virus Ab <0.1 0.0 - 0.9 s/co ratio CVD REPORT Result Value Ref Range INTERPRETATION Note Physical Exam 
Visit Vitals /60 (BP 1 Location: Left arm, BP Patient Position: Sitting) Pulse 64 Temp 97.6 °F (36.4 °C) (Oral) Resp 18 Ht 5' 3\" (1.6 m) Wt 137 lb 3.2 oz (62.2 kg) LMP 01/01/2000 SpO2 97% BMI 24.30 kg/m² Head: Normocephalic, without obvious abnormality, atraumatic Eyes: conjunctivae/corneas clear. PERRL, EOM's intact. Neck: supple, symmetrical, trachea midline, no adenopathy, thyroid: not enlarged, symmetric, no tenderness/mass/nodules, no carotid bruit and no JVD Lungs: clear to auscultation bilaterally. Few rhonchi with coughing, heard mainly interiorly. Heart: regular rate and rhythm, S1, S2 normal, no murmur, click, rub or gallop Extremities: extremities normal, atraumatic, no cyanosis or edema Pulses: 2+ and symmetric Lymph nodes: Cervical, supraclavicular, and axillary nodes normal. 
Neurologic: Grossly normal 
 
 
ASSESSMENT and PLAN 
  ICD-10-CM ICD-9-CM 1. Acute bacterial sinusitis J01.90 461.9 amoxicillin (AMOXIL) 500 mg capsule  B96.89    
 2. Fever blister- multiple locations, recurring ~ once a month for years B00.1 054.9 3. Hyperlipidemia, unspecified hyperlipidemia type E78.5 272.4 4. Vitamin D deficiency E55.9 268.9 VITAMIN D, 25 HYDROXY 5. Benign microscopic hematuria- most recent w/u neg in 2016 R31.1 599.72   
6. Depression with anxiety F41.8 300.4 7. Hyperlipidemia LDL goal <130 E78.5 272.4 LIPID PANEL  
   METABOLIC PANEL, COMPREHENSIVE 8. Former smoker Q52.286 V15.82 Diagnoses and all orders for this visit: 
 
1. Acute bacterial sinusitis 
-     amoxicillin (AMOXIL) 500 mg capsule; Take 1 Cap by mouth three (3) times daily for 10 days. 2. Fever blister- multiple locations, recurring ~ once a month for years 3. Hyperlipidemia, unspecified hyperlipidemia type 4. Vitamin D deficiency 
-     VITAMIN D, 25 HYDROXY 5. Benign microscopic hematuria- most recent w/u neg in 2016 6. Depression with anxiety 7. Hyperlipidemia LDL goal <130 
-     LIPID PANEL 
-     METABOLIC PANEL, COMPREHENSIVE 8. Former smoker Follow-up Disposition: 
Return in about 6 months (around 4/22/2019), or if sinusitis symptoms worsen or fail to improve, for F/U HTN and CHOL. lab results and schedule of future lab studies reviewed with patient 
reviewed diet, exercise and weight control 
cardiovascular risk and specific lipid/LDL goals reviewed 
reviewed medications and side effects in detail Please call my office if there are any questions- [phone]. I will arrange for follow up after the lab tests done from today return Recommended a weekly \"heart check. \" I went into detail how to do this. Call for refills on medications as needed. Discussed expected course/resolution/complications of diagnosis in detail with patient. Medication risks/benefits/costs/interactions/alternatives discussed with patient. Pt was given an after visit summary which includes diagnoses, current medications & vitals. Pt expressed understanding with the diagnosis and plan. Total 25 minutes,60 % counseling re: Recommended a weekly \"heart check. \" I went into detail how to do this. Regular exercise is very important to your health; it helps mentally, physically, socially; it prevents injuries if done properly. Exercise, even as simple as walking 20-30 minutes daily has major benefits to your health even though your \"numbers\" are the same in the lab. See if you can add this into your daily regimen and after a few months it will become a regular habit-\"just something you do,\" like brushing your teeth. A combination of aerobic exercise and strengthening and stretching is felt to be the best for you, so this should be your ultimate goal.  
This can be done in the privacy of your home or in a group setting as at the gym  Some prefer having a , others prefer to do exercise in groups or individually. Do what \"works\" for you. You need to make it simple and \"fun,\" or you most likely you will not continue it. Reviewed symptoms, or lack thereof, of hypertension and elevated cholesterol. Reviewed in detail the proper technique of checking the blood pressure- check it on an average day only, not on a stressful day, sitting, no exercise for at least 1 hour and not experiencing any new pain( chronic pain is OK). Patient encouraged to check BP sitting and standing at least once a month and to report these readings to me if > 130/ 80 on average , or if the standing BP is >  15 points lower than the sitting. If good Vit D level, recheck yearly and continue same Vit D intake lifelong. Told her to continue the Mucinex DM and gave her amoxicillin to take if the material she is coughing up becomes more discolored or has blood in it. Extra Strength Tylenol 500 mg 1-2 every 6 hours as needed for pain. Saline nose spray regularly, at least 4 times a day I informed her that she will likely need to be on a separate dose of vitamin D, besides what is in her multivitamin. Also, discussed symptoms of concern that were noted today in the note above, treatment options( including doing nothing), when to follow up before recommended time frame. Also, answered all questions. This document was written by Igor Corona, as dictated by Elina Florence MD. 
I have reviewed and agree with the above note and have made corrections where appropriate Noé Monroe M.D.

## 2018-10-22 NOTE — PROGRESS NOTES
Chief Complaint Patient presents with  Cholesterol Problem  Vitamin D Deficiency  Sinus Infection  
  cough, congestion, headache x 4 days 1. Have you been to the ER, urgent care clinic since your last visit? Hospitalized since your last visit? No 
 
2. Have you seen or consulted any other health care providers outside of the [address] [place] [address] since your last visit? Include any pap smears or colon screening.  No

## 2019-01-25 ENCOUNTER — HOSPITAL ENCOUNTER (OUTPATIENT)
Dept: MAMMOGRAPHY | Age: 69
Discharge: HOME | End: 2019-01-25
Attending: FAMILY MEDICINE
Payer: MEDICARE

## 2019-01-25 DIAGNOSIS — Z12.39 BREAST SCREENING: ICD-10-CM

## 2019-01-25 PROCEDURE — 77067 SCR MAMMO BI INCL CAD: CPT

## 2019-03-04 RX ORDER — CITALOPRAM 40 MG/1
TABLET, FILM COATED ORAL
Qty: 90 TAB | Refills: 1 | Status: SHIPPED | OUTPATIENT
Start: 2019-03-04 | End: 2019-07-30 | Stop reason: SDUPTHER

## 2019-04-10 ENCOUNTER — OFFICE VISIT (OUTPATIENT)
Dept: FAMILY MEDICINE CLINIC | Age: 69
End: 2019-04-10

## 2019-04-10 ENCOUNTER — HOSPITAL ENCOUNTER (OUTPATIENT)
Dept: LAB | Age: 69
Discharge: HOME | End: 2019-04-10
Payer: MEDICARE

## 2019-04-10 VITALS
RESPIRATION RATE: 18 BRPM | TEMPERATURE: 97.8 F | WEIGHT: 139.2 LBS | HEIGHT: 63 IN | HEART RATE: 64 BPM | OXYGEN SATURATION: 92 % | DIASTOLIC BLOOD PRESSURE: 70 MMHG | SYSTOLIC BLOOD PRESSURE: 110 MMHG | BODY MASS INDEX: 24.66 KG/M2

## 2019-04-10 DIAGNOSIS — E78.2 MIXED HYPERLIPIDEMIA: Primary | ICD-10-CM

## 2019-04-10 DIAGNOSIS — Z00.00 MEDICARE ANNUAL WELLNESS VISIT, SUBSEQUENT: ICD-10-CM

## 2019-04-10 DIAGNOSIS — F41.9 ANXIETY DISORDER, UNSPECIFIED TYPE: ICD-10-CM

## 2019-04-10 DIAGNOSIS — E55.9 VITAMIN D DEFICIENCY: ICD-10-CM

## 2019-04-10 DIAGNOSIS — Z71.89 ACP (ADVANCE CARE PLANNING): ICD-10-CM

## 2019-04-10 DIAGNOSIS — F41.8 DEPRESSION WITH ANXIETY: ICD-10-CM

## 2019-04-10 DIAGNOSIS — E78.5 HYPERLIPIDEMIA LDL GOAL <130: ICD-10-CM

## 2019-04-10 PROCEDURE — 80053 COMPREHEN METABOLIC PANEL: CPT

## 2019-04-10 PROCEDURE — 80061 LIPID PANEL: CPT

## 2019-04-10 RX ORDER — ALPRAZOLAM 0.5 MG/1
0.5 TABLET ORAL DAILY
Qty: 30 TAB | Refills: 5 | Status: SHIPPED | OUTPATIENT
Start: 2019-04-10 | End: 2019-07-30 | Stop reason: SDUPTHER

## 2019-04-10 NOTE — PROGRESS NOTES
Advance Care Planning (ACP) Provider Note - Comprehensive Date of ACP Conversation: 04/10/19 Persons included in Conversation:  patient Length of ACP Conversation in minutes:  <16 minutes (Non-Billable) Authorized Decision Maker (if patient is incapable of making informed decisions): This person is: 
Healthcare Agent/Medical Power of  under Advance Directive General ACP for ALL Patients with Decision Making Capacity: 
 Importance of advance care planning, including choosing a healthcare agent to communicate patient's healthcare decisions if patient lost the ability to make decisions, such as after a sudden illness or accident Understanding of the healthcare agent role was assessed and information provided Review of Existing Advance Directive: 
Does this advance directive still reflect your preferences? Yes (Provide new form/Refer for assistance in updating) For Serious or Chronic Illness: 
No known illness Interventions Provided: 
Recommended completion of Advance Directive form after review of ACP materials and conversation with prospective healthcare agent Recommended communicating the plan and making copies for the healthcare agent, personal physician, and others as appropriate (e.g., health system) Recommended review of completed ACP document annually or upon change in health status

## 2019-04-10 NOTE — PATIENT INSTRUCTIONS
Medicare Wellness Visit, Female The best way to live healthy is to have a lifestyle where you eat a well-balanced diet, exercise regularly, limit alcohol use, and quit all forms of tobacco/nicotine, if applicable. Regular preventive services are another way to keep healthy. Preventive services (vaccines, screening tests, monitoring & exams) can help personalize your care plan, which helps you manage your own care. Screening tests can find health problems at the earliest stages, when they are easiest to treat. [facility] follows the current, evidence-based guidelines published by the [facility] Ede Marcello ([facility]) when recommending preventive services for our patients. Because we follow these guidelines, sometimes recommendations change over time as research supports it. (For example, mammograms used to be recommended annually. Even though Medicare will still pay for an annual mammogram, the newer guidelines recommend a mammogram every two years for women of average risk.) Of course, you and your doctor may decide to screen more often for some diseases, based on your risk and your health status. Preventive services for you include: - Medicare offers their members a free annual wellness visit, which is time for you and your primary care provider to discuss and plan for your preventive service needs. Take advantage of this benefit every year! 
-All adults over the age of 72 should receive the recommended pneumonia vaccines. Current USPSTF guidelines recommend a series of two vaccines for the best pneumonia protection.  
-All adults should have a flu vaccine yearly and a tetanus vaccine every 10 years. All adults age 61 and older should receive a shingles vaccine once in their lifetime.   
-A bone mass density test is recommended when a woman turns 65 to screen for osteoporosis. This test is only recommended one time, as a screening. Some providers will use this same test as a disease monitoring tool if you already have osteoporosis. -All adults age 38-68 who are overweight should have a diabetes screening test once every three years.  
-Other screening tests and preventive services for persons with diabetes include: an eye exam to screen for diabetic retinopathy, a kidney function test, a foot exam, and stricter control over your cholesterol.  
-Cardiovascular screening for adults with routine risk involves an electrocardiogram (ECG) at intervals determined by your doctor.  
-Colorectal cancer screenings should be done for adults age 54-65 with no increased risk factors for colorectal cancer. There are a number of acceptable methods of screening for this type of cancer. Each test has its own benefits and drawbacks. Discuss with your doctor what is most appropriate for you during your annual wellness visit. The different tests include: colonoscopy (considered the best screening method), a fecal occult blood test, a fecal DNA test, and sigmoidoscopy. -Breast cancer screenings are recommended every other year for women of normal risk, age 54-69. 
-Cervical cancer screenings for women over age 72 are only recommended with certain risk factors.  
-All adults born between [facility] should be screened once for Hepatitis C. Here is a list of your current Health Maintenance items (your personalized list of preventive services) with a due date: 
Health Maintenance Due Topic Date Due  Pneumococcal Vaccine (1 of 2 - PCV13) 07/04/2015 [facility] Annual Well Visit  04/20/2019

## 2019-04-10 NOTE — PROGRESS NOTES
Chief Complaint Patient presents with  Hypertension 6 month office visit  Cholesterol Problem 6 month office visit - pt fasting 1. Have you been to the ER, urgent care clinic since your last visit? Hospitalized since your last visit? no 
 
2. Have you seen or consulted any other health care providers outside of the [address] [place] [address] since your last visit? Include any pap smears or colon screening.  No

## 2019-04-10 NOTE — PROGRESS NOTES
HISTORY OF PRESENT ILLNESS 
HPI Nayla Sandhu is a 76 y.o. Female with a history of postmenopausal HRT, hyperlipidemia and depression with anxiety, who presents at the office today for a follow up. Pt reports that she has some occasional dizziness with positional changes, such as bending over and standing up. Pt denies unusual SOB, chest pain, and any recent ER visits or hospitalizations. Past Medical History:  
Diagnosis Date  Anxiety  Benign microscopic hematuria- most recent w/u neg in 2016 2/14/2017  Depression  Fever blister 4/19/2018  
 mainly upper lip, chronic  Former smoker 3/11/2010  Hyperlipidemia 4/14/2014  Leg cramps 3/11/2010  Postmenopausal HRT (hormone replacement therapy) 3/11/2010  
 stopped 2010 Past Surgical History:  
Procedure Laterality Date  HX BREAST BIOPSY Right yrs ago  
 neg; u/s guided  HX GI    
 hemmorhoidectomy, fissure Current Outpatient Medications on File Prior to Visit Medication Sig Dispense Refill  citalopram (CELEXA) 40 mg tablet TAKE 1 TABLET BY MOUTH ONCE DAILY 90 Tab 1  valACYclovir (VALTREX) 500 mg tablet TAKE ONE TABLET BY MOUTH TWICE DAILY FOR  1  WEEK,  THEN  ONCE  DAILY  TO  PREVENT 90 Tab 3  
 naproxen (NAPROSYN) 250 mg tablet Take  by mouth two (2) times daily (with meals). No current facility-administered medications on file prior to visit. Allergies Allergen Reactions  Wellbutrin [Bupropion] Unable to Consolidated Dada Family History Problem Relation Age of Onset  Stroke Mother  Cancer Father   
     larynx  Cancer Sister BRCA  Breast Cancer Sister 48  Diabetes Brother  Diabetes Sister  Psychiatric Disorder Sister Caleb Rose  Breast Cancer Niece  Heart Attack Maternal Grandmother 72 Social History Socioeconomic History  Marital status:  Spouse name: Not on file  Number of children: Not on file  Years of education: Not on file  Highest education level: Not on file Tobacco Use  Smoking status: Former Smoker Packs/day: 1.00 Years: 30.00 Pack years: 30.00 Types: Cigarettes Last attempt to quit: 2005 Years since quittin.2  Smokeless tobacco: Never Used Substance and Sexual Activity  Alcohol use: Yes Alcohol/week: 0.0 oz  
  Comment: ocassional  
 Drug use: No  
 Sexual activity: Yes  
  Partners: Male Review of Systems Constitutional: Negative for chills, diaphoresis, fever, malaise/fatigue and weight loss. Eyes: Negative for blurred vision, double vision, pain and redness. Respiratory: Negative for cough, shortness of breath and wheezing. Cardiovascular: Negative for chest pain, palpitations, orthopnea, claudication, leg swelling and PND. Skin: Negative for itching and rash. Neurological: Positive for dizziness. Negative for tingling, tremors, sensory change, speech change, focal weakness, seizures, loss of consciousness, weakness and headaches. Results for orders placed or performed in visit on 18 METABOLIC PANEL, COMPREHENSIVE Result Value Ref Range Glucose 85 65 - 99 mg/dL BUN 17 8 - 27 mg/dL Creatinine 0.65 0.57 - 1.00 mg/dL GFR est non-AA 92 >59 mL/min/1.73 GFR est  >59 mL/min/1.73  
 BUN/Creatinine ratio 26 12 - 28 Sodium 143 134 - 144 mmol/L Potassium 4.3 3.5 - 5.2 mmol/L Chloride 103 96 - 106 mmol/L  
 CO2 25 18 - 29 mmol/L Calcium 9.8 8.7 - 10.3 mg/dL Protein, total 6.8 6.0 - 8.5 g/dL Albumin 4.5 3.6 - 4.8 g/dL GLOBULIN, TOTAL 2.3 1.5 - 4.5 g/dL A-G Ratio 2.0 1.2 - 2.2 Bilirubin, total 0.3 0.0 - 1.2 mg/dL Alk. phosphatase 64 39 - 117 IU/L  
 AST (SGOT) 12 0 - 40 IU/L  
 ALT (SGPT) 10 0 - 32 IU/L  
LIPID PANEL Result Value Ref Range Cholesterol, total 226 (H) 100 - 199 mg/dL Triglyceride 111 0 - 149 mg/dL HDL Cholesterol 68 >39 mg/dL VLDL, calculated 22 5 - 40 mg/dL LDL, calculated 136 (H) 0 - 99 mg/dL CBC W/O DIFF Result Value Ref Range WBC 8.2 3.4 - 10.8 x10E3/uL  
 RBC 4.57 3.77 - 5.28 x10E6/uL HGB 14.0 11.1 - 15.9 g/dL HCT 42.0 34.0 - 46.6 % MCV 92 79 - 97 fL  
 MCH 30.6 26.6 - 33.0 pg  
 MCHC 33.3 31.5 - 35.7 g/dL  
 RDW 14.3 12.3 - 15.4 % PLATELET 559 (H) 534 - 379 x10E3/uL VITAMIN D, 25 HYDROXY Result Value Ref Range VITAMIN D, 25-HYDROXY 18.8 (L) 30.0 - 100.0 ng/mL HEPATITIS C AB Result Value Ref Range Hep C Virus Ab <0.1 0.0 - 0.9 s/co ratio CVD REPORT Result Value Ref Range INTERPRETATION Note Physical Exam 
Visit Vitals /70 (BP 1 Location: Right arm, BP Patient Position: Sitting) Pulse 64 Temp 97.8 °F (36.6 °C) (Oral) Resp 18 Ht 5' 3\" (1.6 m) Wt 139 lb 3.2 oz (63.1 kg) LMP 01/01/2000 SpO2 92% BMI 24.66 kg/m² Head: Normocephalic, without obvious abnormality, atraumatic Eyes: conjunctivae/corneas clear. PERRL, EOM's intact. Neck: supple, symmetrical, trachea midline, no adenopathy, thyroid: not enlarged, symmetric, no tenderness/mass/nodules, no carotid bruit and no JVD Lungs: clear to auscultation bilaterally Heart: regular rate and rhythm, S1, S2 normal, no murmur, click, rub or gallop Extremities: extremities normal, atraumatic, no cyanosis or edema Pulses: 2+ and symmetric Lymph nodes: Cervical, supraclavicular, and axillary nodes normal. 
Neurologic: Grossly normal 
 
 
ASSESSMENT and PLAN 
  ICD-10-CM ICD-9-CM 1. Mixed hyperlipidemia U03.3 375.4 METABOLIC PANEL, COMPREHENSIVE  
   LIPID PANEL 2. Anxiety disorder, unspecified type F41.9 300.00 ALPRAZolam (XANAX) 0.5 mg tablet 3. Depression with anxiety F41.8 300.4 4. Vitamin D deficiency E55.9 268.9 5. Hyperlipidemia LDL goal <130 E78.5 272.4 6. ACP (advance care planning) Z71.89 V65.49   
7. Medicare annual wellness visit, subsequent Z00.00 V70.0 Diagnoses and all orders for this visit: 
 
1. Mixed hyperlipidemia -     METABOLIC PANEL, COMPREHENSIVE 
-     LIPID PANEL 2. Anxiety disorder, unspecified type -     ALPRAZolam (XANAX) 0.5 mg tablet; Take 1 Tab by mouth daily. Max Daily Amount: 0.5 mg. 
 
3. Depression with anxiety 4. Vitamin D deficiency 5. Hyperlipidemia LDL goal <130 
 
6. ACP (advance care planning) 7. Medicare annual wellness visit, subsequent Follow-up and Dispositions · Return in about 6 months (around 10/10/2019), or if anxiety symptoms worsen or fail to improve, for 6 month F/U chronic controlled substance use. lab results and schedule of future lab studies reviewed with patient 
reviewed diet, exercise and weight control 
cardiovascular risk and specific lipid/LDL goals reviewed 
reviewed medications and side effects in detail Please call my office if there are any questions- [phone]. I will arrange for follow up after the lab tests done from today return Recommended a weekly \"heart check. \" I went into detail how to do this. Call for refills on medications as needed. Discussed expected course/resolution/complications of diagnosis in detail with patient. Medication risks/benefits/costs/interactions/alternatives discussed with patient. Pt was given an after visit summary which includes diagnoses, current medications & vitals. Pt expressed understanding with the diagnosis and plan. Total 25 minutes,60 % counseling re:  
Recommended a weekly \"heart check. \" I went into detail how to do this. Regular exercise is very important to your health; it helps mentally, physically, socially; it prevents injuries if done properly. Exercise, even as simple as walking 20-30 minutes daily has major benefits to your health even though your \"numbers\" are the same in the lab.  See if you can add this into your daily regimen and after a few months it will become a regular habit-\"just something you do,\" like brushing your teeth. A combination of aerobic exercise and strengthening and stretching is felt to be the best for you, so this should be your ultimate goal.  
This can be done in the privacy of your home or in a group setting as at the gym  Some prefer having a , others prefer to do exercise in groups or individually. Do what \"works\" for you. You need to make it simple and \"fun,\" or you most likely you will not continue it. Long discussion about chronic benzodiazepine use, the risks of addiction, tolerance , overdose,etc. The national spotlight on this problem and the need for a controlled substance agreement in order to continue receiving these, etc. 
Continue to do the non-medication things that reduce anxiety such as adequate sleep, avoiding high impact activities, but at the same time staying active and avoiding sedentary activity. In addition, the patient was counseled today on the potential risks of benzodiazepine use including but not limited to death if not taken as prescribed or if taken in conjunction with other sedative, hypnotic, or pain medications, and the need to refrain from any recreational drugs and/or alcohol. I encouraged her to let us know if she has any return of her vertigo. Discussed having her start a statin to prevent stroke and heart attack. The American Heart Association Heart Risk Calculation( a new way to calculate your risk of a stroke or heart attack in the future) shows your risk of a heart attack or stroke in the next 10 years; a statin is recommended if that # is > 7.5 %. Weight loss, diet, and exercise lower it enough to avoid medication for a few years only. This new formula has men at 72 and women at 66-77 on average requiring a statin to reduce the age related risk of having a stroke or heart attack, even with no other health problems, including normal cholesterol numbers.  I will calculate your risk after your cholesterol numbers return. It has become clear from recent studies, that your risk of death from a stroke or heart attack is reduced significantly with a group of medications called statins which in the past have been known as \"cholesterol medications. \"  
  Also, discussed symptoms of concern that were noted today in the note above, treatment options( including doing nothing), when to follow up before recommended time frame. Also, answered all questions. This document was written by Ranulfo Loco, as dictated by Sara Larson MD. 
I have reviewed and agree with the above note and have made corrections where appropriate Noé Constantino M.D. This is the Subsequent Medicare Annual Wellness Exam, performed 12 months or more after the Initial AWV or the last Subsequent AWV I have reviewed the patient's medical history in detail and updated the computerized patient record. History Past Medical History:  
Diagnosis Date  Anxiety  Benign microscopic hematuria- most recent w/u neg in 2016 2/14/2017  Depression  Fever blister 4/19/2018  
 mainly upper lip, chronic  Former smoker 3/11/2010  Hyperlipidemia 4/14/2014  Leg cramps 3/11/2010  Postmenopausal HRT (hormone replacement therapy) 3/11/2010  
 stopped 2010 Past Surgical History:  
Procedure Laterality Date  HX BREAST BIOPSY Right yrs ago  
 neg; u/s guided  HX GI    
 hemmorhoidectomy, fissure Current Outpatient Medications Medication Sig Dispense Refill  ALPRAZolam (XANAX) 0.5 mg tablet Take 1 Tab by mouth daily. Max Daily Amount: 0.5 mg. 30 Tab 5  citalopram (CELEXA) 40 mg tablet TAKE 1 TABLET BY MOUTH ONCE DAILY 90 Tab 1  valACYclovir (VALTREX) 500 mg tablet TAKE ONE TABLET BY MOUTH TWICE DAILY FOR  1  WEEK,  THEN  ONCE  DAILY  TO  PREVENT 90 Tab 3  
 naproxen (NAPROSYN) 250 mg tablet Take  by mouth two (2) times daily (with meals). Allergies Allergen Reactions  Wellbutrin [Bupropion] Unable to Consolidated Dada Family History Problem Relation Age of Onset  Stroke Mother  Cancer Father   
     larynx  Cancer Sister BRCA  Breast Cancer Sister 48  Diabetes Brother  Diabetes Sister  Psychiatric Disorder Sister Mateusz Reynolds  Breast Cancer Niece  Heart Attack Maternal Grandmother 72 Social History Tobacco Use  Smoking status: Former Smoker Packs/day: 1.00 Years: 30.00 Pack years: 30.00 Types: Cigarettes Last attempt to quit: 2005 Years since quittin.2  Smokeless tobacco: Never Used Substance Use Topics  Alcohol use: Yes Alcohol/week: 0.0 oz  
  Comment: ocassional  
 
Patient Active Problem List  
Diagnosis Code [facility] Former smoker S11.241  Postmenopausal HRT (hormone replacement therapy) J08.213  Leg cramps R25.2  Depression with anxiety F41.8  Hyperlipidemia E78.5  Pre-diabetes R73.03  Benign microscopic hematuria- most recent w/u neg in 2016 R31.1  Fever blister B00.1 Depression Risk Factor Screening:  
 
3 most recent PHQ Screens 4/10/2019 Little interest or pleasure in doing things Not at all Feeling down, depressed, irritable, or hopeless Not at all Total Score PHQ 2 0 Alcohol Risk Factor Screening: You do not drink alcohol or very rarely. Functional Ability and Level of Safety:  
Hearing Loss Hearing is good. Activities of Daily Living The home contains: no safety equipment. Patient does total self care Fall Risk Fall Risk Assessment, last 12 mths 4/10/2019 Able to walk? Yes Fall in past 12 months? No  
Fall with injury? -  
Number of falls in past 12 months - Fall Risk Score -  
 
 
Abuse Screen Patient is not abused Cognitive Screening Evaluation of Cognitive Function: 
Has your family/caregiver stated any concerns about your memory: no 
Normal 
 
Patient Care Team  
Patient Care Team: Charlanne Bumpers, MD as PCP - General 
 
Assessment/Plan Education and counseling provided: 
Are appropriate based on today's review and evaluation Diagnoses and all orders for this visit: 
 
1. Mixed hyperlipidemia -     METABOLIC PANEL, COMPREHENSIVE 
-     LIPID PANEL 2. Anxiety disorder, unspecified type -     ALPRAZolam (XANAX) 0.5 mg tablet; Take 1 Tab by mouth daily. Max Daily Amount: 0.5 mg. 
 
3. Depression with anxiety 4. Vitamin D deficiency 5. Hyperlipidemia LDL goal <130 
 
6. ACP (advance care planning) 7. Medicare annual wellness visit, subsequent Health Maintenance Due Topic Date Due  Pneumococcal 65+ years (1 of 2 - PCV13) 07/04/2015  MEDICARE YEARLY EXAM  04/20/2019

## 2019-04-11 LAB
ALBUMIN SERPL-MCNC: 4 G/DL (ref 3.6–4.8)
ALBUMIN/GLOB SERPL: 1.8 {RATIO} (ref 1.2–2.2)
ALP SERPL-CCNC: 59 IU/L (ref 39–117)
ALT SERPL-CCNC: 12 IU/L (ref 0–32)
AST SERPL-CCNC: 18 IU/L (ref 0–40)
BILIRUB SERPL-MCNC: 0.3 MG/DL (ref 0–1.2)
BUN SERPL-MCNC: 16 MG/DL (ref 8–27)
BUN/CREAT SERPL: 25 (ref 12–28)
CALCIUM SERPL-MCNC: 9.3 MG/DL (ref 8.7–10.3)
CHLORIDE SERPL-SCNC: 107 MMOL/L (ref 96–106)
CHOLEST SERPL-MCNC: 206 MG/DL (ref 100–199)
CO2 SERPL-SCNC: 24 MMOL/L (ref 20–29)
CREAT SERPL-MCNC: 0.64 MG/DL (ref 0.57–1)
GLOBULIN SER CALC-MCNC: 2.2 G/DL (ref 1.5–4.5)
GLUCOSE SERPL-MCNC: 89 MG/DL (ref 65–99)
HDLC SERPL-MCNC: 62 MG/DL
INTERPRETATION, 910389: NORMAL
LDLC SERPL CALC-MCNC: 129 MG/DL (ref 0–99)
POTASSIUM SERPL-SCNC: 4.6 MMOL/L (ref 3.5–5.2)
PROT SERPL-MCNC: 6.2 G/DL (ref 6–8.5)
SODIUM SERPL-SCNC: 146 MMOL/L (ref 134–144)
TRIGL SERPL-MCNC: 74 MG/DL (ref 0–149)
VLDLC SERPL CALC-MCNC: 15 MG/DL (ref 5–40)

## 2019-04-11 RX ORDER — ROSUVASTATIN CALCIUM 10 MG/1
10 TABLET, COATED ORAL
Qty: 90 TAB | Refills: 0 | Status: SHIPPED | OUTPATIENT
Start: 2019-04-11 | End: 2019-07-30 | Stop reason: SDUPTHER

## 2019-04-11 NOTE — PROGRESS NOTES
GREAT NEWS!! All of your recent lab tests are normal or at goal.  No diabetes, normal liver and kidney tests. Great cholesterol numbers. However, the American Heart Association Heart Risk Calculation( a new way to calculate your risk of a stroke or heart attack in the future) shows your risk to be 6% % chance of a heart attack or stroke in the next 10 years( mainly due to your age); a statin ( a cholesterol lowering medication )  is recommended if that # is > 7.5 %. Because of your history in the family of stroke and heart attack,and prior history of smoking  I want you to start rosuvastatin 10 mg and recheck a fasting office visit in 4-6 weeks. If you have questions before you start on this, please make an appointment and I will be glad to answer those. I would continue everything the same and schedule a physical/ Annual Wellness Visit yearly.

## 2019-04-11 NOTE — PROGRESS NOTES
Spoke to pt about labs results and about her starting crestor 10 mg tablet, rx ordered, and appt scheduled 06/17/2019 at 1015am

## 2019-07-30 ENCOUNTER — OFFICE VISIT (OUTPATIENT)
Dept: FAMILY MEDICINE CLINIC | Age: 69
End: 2019-07-30

## 2019-07-30 ENCOUNTER — HOSPITAL ENCOUNTER (OUTPATIENT)
Dept: LAB | Age: 69
Discharge: HOME | End: 2019-07-30
Payer: MEDICARE

## 2019-07-30 VITALS
HEART RATE: 92 BPM | RESPIRATION RATE: 18 BRPM | HEIGHT: 63 IN | TEMPERATURE: 98.1 F | OXYGEN SATURATION: 98 % | BODY MASS INDEX: 23.39 KG/M2 | DIASTOLIC BLOOD PRESSURE: 76 MMHG | SYSTOLIC BLOOD PRESSURE: 118 MMHG | WEIGHT: 132 LBS

## 2019-07-30 DIAGNOSIS — F41.9 ANXIETY DISORDER, UNSPECIFIED TYPE: ICD-10-CM

## 2019-07-30 DIAGNOSIS — F41.8 DEPRESSION WITH ANXIETY: ICD-10-CM

## 2019-07-30 DIAGNOSIS — E78.5 HYPERLIPIDEMIA LDL GOAL <130: ICD-10-CM

## 2019-07-30 DIAGNOSIS — R31.1 BENIGN MICROSCOPIC HEMATURIA: ICD-10-CM

## 2019-07-30 DIAGNOSIS — E78.5 HYPERLIPIDEMIA, UNSPECIFIED HYPERLIPIDEMIA TYPE: Primary | ICD-10-CM

## 2019-07-30 DIAGNOSIS — Z87.891 FORMER SMOKER: ICD-10-CM

## 2019-07-30 DIAGNOSIS — E55.9 VITAMIN D DEFICIENCY: ICD-10-CM

## 2019-07-30 DIAGNOSIS — B00.1 FEVER BLISTER: ICD-10-CM

## 2019-07-30 PROCEDURE — 80053 COMPREHEN METABOLIC PANEL: CPT

## 2019-07-30 PROCEDURE — 36415 COLL VENOUS BLD VENIPUNCTURE: CPT

## 2019-07-30 PROCEDURE — 80061 LIPID PANEL: CPT

## 2019-07-30 RX ORDER — CITALOPRAM 40 MG/1
TABLET, FILM COATED ORAL
Qty: 90 TAB | Refills: 1 | Status: SHIPPED | OUTPATIENT
Start: 2019-07-30 | End: 2020-02-25

## 2019-07-30 RX ORDER — ALPRAZOLAM 0.5 MG/1
0.5 TABLET ORAL DAILY
Qty: 30 TAB | Refills: 5 | Status: SHIPPED | OUTPATIENT
Start: 2019-07-30 | End: 2020-02-20

## 2019-07-30 RX ORDER — VALACYCLOVIR HYDROCHLORIDE 500 MG/1
TABLET, FILM COATED ORAL
Qty: 90 TAB | Refills: 3 | Status: SHIPPED | OUTPATIENT
Start: 2019-07-30 | End: 2020-07-31

## 2019-07-30 RX ORDER — ROSUVASTATIN CALCIUM 10 MG/1
10 TABLET, COATED ORAL
Qty: 90 TAB | Refills: 0 | Status: SHIPPED | OUTPATIENT
Start: 2019-07-30 | End: 2019-12-02 | Stop reason: SDUPTHER

## 2019-07-30 NOTE — PROGRESS NOTES
HISTORY OF PRESENT ILLNESS  HPI  Jose Han is a 71 y.o. Female with a history of postmenopausal HRT, fever blister, depression with anxiety, leg cramps and hyperlipidemia, who presents to the office today for a follow up on her cholesterol. Pt says she has taken the Crestor less than half of the days in the last month as it is hard to rmember totake it in the PM.. Pt notes taking the Xanax at bedtime, which she has been taking for around a year now. Pt has maintained the same dose throughout. Pt mentions taking Celexa for depression. Pt states she tried tapering off in the past but noticed a return of her symptoms. 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 Pt is seen at [facility] regularly, where she had an actinic keratosis zapped( LN2) off of her nose yesterday. Pt denies unusual SOB, chest pain, and any recent ER visits or hospitalizations. Past Medical History:   Diagnosis Date    Anxiety     Benign microscopic hematuria- most recent w/u neg in 2016 2/14/2017    Depression     Fever blister 4/19/2018    mainly upper lip, chronic    Former smoker 3/11/2010    Hyperlipidemia 4/14/2014    Leg cramps 3/11/2010    Postmenopausal HRT (hormone replacement therapy) 3/11/2010    stopped 2010     Past Surgical History:   Procedure Laterality Date    HX BREAST BIOPSY Right yrs ago    neg; u/s guided    HX GI      hemmorhoidectomy, fissure     Current Outpatient Medications on File Prior to Visit   Medication Sig Dispense Refill    naproxen (NAPROSYN) 250 mg tablet Take  by mouth two (2) times daily (with meals). No current facility-administered medications on file prior to visit.       Allergies   Allergen Reactions    Wellbutrin [Bupropion] Unable to Obtain     Family History   Problem Relation Age of Onset    Stroke Mother     Cancer Father         larynx   Marissa Lites Cancer Sister         BRCA    Breast Cancer Sister 48    Diabetes Brother     Diabetes Sister     Psychiatric Disorder Sister         Brandt Mcdonald Breast Cancer Niece     Heart Attack Maternal Grandmother 72     Social History     Socioeconomic History    Marital status:      Spouse name: Not on file    Number of children: Not on file    Years of education: Not on file    Highest education level: Not on file   Tobacco Use    Smoking status: Former Smoker     Packs/day: 1.00     Years: 30.00     Pack years: 30.00     Types: Cigarettes     Last attempt to quit: 2005     Years since quittin.5    Smokeless tobacco: Never Used   Substance and Sexual Activity    Alcohol use: Yes     Alcohol/week: 0.0 standard drinks     Comment: ocassional    Drug use: No    Sexual activity: Yes     Partners: Male             Review of Systems   Constitutional: Negative for chills, diaphoresis, fever, malaise/fatigue and weight loss. Eyes: Negative for blurred vision, double vision, pain and redness. Respiratory: Negative for cough, shortness of breath and wheezing. Cardiovascular: Negative for chest pain, palpitations, orthopnea, claudication, leg swelling and PND. Skin: Negative for itching and rash. Neurological: Negative for dizziness, tingling, tremors, sensory change, speech change, focal weakness, seizures, loss of consciousness, weakness and headaches.      Results for orders placed or performed in visit on    METABOLIC PANEL, COMPREHENSIVE   Result Value Ref Range    Glucose 89 65 - 99 mg/dL    BUN 16 8 - 27 mg/dL    Creatinine 0.64 0.57 - 1.00 mg/dL    GFR est non-AA 92 >59 mL/min/1.73    GFR est  >59 mL/min/1.73    BUN/Creatinine ratio 25 12 - 28    Sodium 146 (H) 134 - 144 mmol/L    Potassium 4.6 3.5 - 5.2 mmol/L    Chloride 107 (H) 96 - 106 mmol/L    CO2 24 20 - 29 mmol/L    Calcium 9.3 8.7 - 10.3 mg/dL    Protein, total 6.2 6.0 - 8.5 g/dL    Albumin 4.0 3.6 - 4.8 g/dL    GLOBULIN, TOTAL 2.2 1.5 - 4.5 g/dL    A-G Ratio 1.8 1.2 - 2.2    Bilirubin, total 0.3 0.0 - 1.2 mg/dL    Alk. phosphatase 59 39 - 117 IU/L    AST (SGOT) 18 0 - 40 IU/L    ALT (SGPT) 12 0 - 32 IU/L   LIPID PANEL   Result Value Ref Range    Cholesterol, total 206 (H) 100 - 199 mg/dL    Triglyceride 74 0 - 149 mg/dL    HDL Cholesterol 62 >39 mg/dL    VLDL, calculated 15 5 - 40 mg/dL    LDL, calculated 129 (H) 0 - 99 mg/dL   CVD REPORT   Result Value Ref Range    INTERPRETATION Note          Physical Exam  Visit Vitals  /76 (BP 1 Location: Right arm, BP Patient Position: Sitting)   Pulse 92   Temp 98.1 °F (36.7 °C) (Oral)   Resp 18   Ht 5' 3\" (1.6 m)   Wt 132 lb (59.9 kg)   LMP 01/01/2000   SpO2 98%   BMI 23.38 kg/m²         Head: Normocephalic, without obvious abnormality, atraumatic  Eyes: conjunctivae/corneas clear. PERRL, EOM's intact. Neck: supple, symmetrical, trachea midline, no adenopathy, thyroid: not enlarged, symmetric, no tenderness/mass/nodules, no carotid bruit and no JVD  Lungs: clear to auscultation bilaterally  Heart: regular rate and rhythm, S1, S2 normal, no murmur, click, rub or gallop  Extremities: extremities normal, atraumatic, no cyanosis or edema  Pulses: 2+ and symmetric  Lymph nodes: Cervical, supraclavicular, and axillary nodes normal.  Neurologic: Grossly normal      ASSESSMENT and PLAN    ICD-10-CM ICD-9-CM    1. Hyperlipidemia, unspecified hyperlipidemia type E60.4 562.7 METABOLIC PANEL, COMPREHENSIVE      LIPID PANEL      rosuvastatin (CRESTOR) 10 mg tablet   2. Anxiety disorder, unspecified type F41.9 300.00 ALPRAZolam (XANAX) 0.5 mg tablet      citalopram (CELEXA) 40 mg tablet   3. Fever blister- multiple locations, recurring ~ once a month for years B00.1 054.9 valACYclovir (VALTREX) 500 mg tablet   4. Hyperlipidemia LDL goal <130 E78.5 272.4    5. Depression with anxiety F41.8 300.4    6. Vitamin D deficiency E55.9 268.9    7. Former smoker Z87.891 V15.82    8.  Benign microscopic hematuria- most recent w/u neg in 2016 R31.1 599.72      Diagnoses and all orders for this visit:    1. Hyperlipidemia, unspecified hyperlipidemia type  -     METABOLIC PANEL, COMPREHENSIVE  -     LIPID PANEL  -     rosuvastatin (CRESTOR) 10 mg tablet; Take 1 Tab by mouth nightly. 2. Anxiety disorder, unspecified type  -     ALPRAZolam (XANAX) 0.5 mg tablet; Take 1 Tab by mouth daily. Max Daily Amount: 0.5 mg.  -     citalopram (CELEXA) 40 mg tablet; TAKE 1 TABLET BY MOUTH ONCE DAILY    3. Fever blister- multiple locations, recurring ~ once a month for years  -     valACYclovir (VALTREX) 500 mg tablet; TAKE ONE TABLET BY MOUTH TWICE DAILY FOR  1  WEEK,  THEN  ONCE  DAILY  TO  PREVENT    4. Hyperlipidemia LDL goal <130    5. Depression with anxiety    6. Vitamin D deficiency    7. Former smoker    8. Benign microscopic hematuria- most recent w/u neg in 2016      Follow-up and Dispositions    · Return in about 6 months (around 1/30/2020) for F/U cholesterol, F/U anxiety. lab results and schedule of future lab studies reviewed with patient  reviewed diet, exercise and weight control  cardiovascular risk and specific lipid/LDL goals reviewed  reviewed medications and side effects in detail  Please call my office if there are any questions- [phone]. I will arrange for follow up after the lab tests done from today return  Recommended a weekly \"heart check. \" I went into detail how to do this. Call for refills on medications as needed. Discussed expected course/resolution/complications of diagnosis in detail with patient. Medication risks/benefits/costs/interactions/alternatives discussed with patient. Pt was given an after visit summary which includes diagnoses, current medications & vitals. Pt expressed understanding with the diagnosis and plan. Total 25 minutes,60 % counseling re:   Recommended a weekly \"heart check. \" I went into detail how to do this.      Regular exercise is very important to your health; it helps mentally, physically, socially; it prevents injuries if done properly. Exercise, even as simple as walking 20-30 minutes daily has major benefits to your health even though your \"numbers\" are the same in the lab. See if you can add this into your daily regimen and after a few months it will become a regular habit-\"just something you do,\" like brushing your teeth. A combination of aerobic exercise and strengthening and stretching is felt to be the best for you, so this should be your ultimate goal.   This can be done in the privacy of your home or in a group setting as at the gym  Some prefer having a , others prefer to do exercise in groups or individually. Do what \"works\" for you. You need to make it simple and \"fun,\" or you most likely you will not continue it. Long discussion about chronic benzodiazepine use, the risks of addiction, tolerance , overdose,etc. The national spotlight on this problem and the need for a controlled substance agreement in order to continue receiving these, etc.  Continue to do the non-medication things that reduce anxiety such as adequate sleep, avoiding high impact activities, but at the same time staying active and avoiding sedentary activity. In addition, the patient was counseled today on the potential risks of benzodiazepine use including but not limited to death if not taken as prescribed or if taken in conjunction with other sedative, hypnotic, or pain medications, and the need to refrain from any recreational drugs and/or alcohol. Because it is easier to remember, she will switch the rosuvastatin to AM with her other medications. Encouraged pt to have the Prevnar 13 done at the pharmacy. Also mentioned the Shingrix vaccine that she has tried to get but is actually not covered by her insurance. Pt continues to do well on the same dose of Xanax used in the evening to help her relax and go to sleep.  Talked briefly about stopping her Celexa but because she has tried to taper off a few times in the past with return of symptoms, she should just stay on it lifelong. Pt should continue her regular follow up with her dermatologist every few months. Also, discussed symptoms of concern that were noted today in the note above, treatment options( including doing nothing), when to follow up before recommended time frame. Also, answered all questions. This document was written by Moshe Montanez, as dictated by Alice Madrid MD.  I have reviewed and agree with the above note and have made corrections where appropriate Noé Taveras M.D.

## 2019-07-30 NOTE — PROGRESS NOTES
Chief Complaint   Patient presents with    Cholesterol Problem     crestor med f/u- pt has been spotty with taking      1. Have you been to the ER, urgent care clinic since your last visit? Hospitalized since your last visit? No    2. Have you seen or consulted any other health care providers outside of the [address] [place] [address] since your last visit? Include any pap smears or colon screening.  No

## 2019-07-31 LAB
ALBUMIN SERPL-MCNC: 4.4 G/DL (ref 3.6–4.8)
ALBUMIN/GLOB SERPL: 1.8 {RATIO} (ref 1.2–2.2)
ALP SERPL-CCNC: 59 IU/L (ref 39–117)
ALT SERPL-CCNC: 12 IU/L (ref 0–32)
AST SERPL-CCNC: 15 IU/L (ref 0–40)
BILIRUB SERPL-MCNC: 0.2 MG/DL (ref 0–1.2)
BUN SERPL-MCNC: 13 MG/DL (ref 8–27)
BUN/CREAT SERPL: 19 (ref 12–28)
CALCIUM SERPL-MCNC: 9.9 MG/DL (ref 8.7–10.3)
CHLORIDE SERPL-SCNC: 101 MMOL/L (ref 96–106)
CHOLEST SERPL-MCNC: 228 MG/DL (ref 100–199)
CO2 SERPL-SCNC: 25 MMOL/L (ref 20–29)
CREAT SERPL-MCNC: 0.69 MG/DL (ref 0.57–1)
GLOBULIN SER CALC-MCNC: 2.4 G/DL (ref 1.5–4.5)
GLUCOSE SERPL-MCNC: 87 MG/DL (ref 65–99)
HDLC SERPL-MCNC: 70 MG/DL
INTERPRETATION, 910389: NORMAL
LDLC SERPL CALC-MCNC: 136 MG/DL (ref 0–99)
POTASSIUM SERPL-SCNC: 4.5 MMOL/L (ref 3.5–5.2)
PROT SERPL-MCNC: 6.8 G/DL (ref 6–8.5)
SODIUM SERPL-SCNC: 143 MMOL/L (ref 134–144)
TRIGL SERPL-MCNC: 110 MG/DL (ref 0–149)
VLDLC SERPL CALC-MCNC: 22 MG/DL (ref 5–40)

## 2019-08-01 NOTE — PROGRESS NOTES
All of your recent lab tests are normal or at goal except the cholesterol is worse- as we discussed, start taking the Rosuvastatin in the AM and recheck fasting office visit in 3 months Iat the October office visit that you have already scheduled. No diabetes, normal liver and kidney tests.

## 2019-12-02 DIAGNOSIS — E78.5 HYPERLIPIDEMIA, UNSPECIFIED HYPERLIPIDEMIA TYPE: ICD-10-CM

## 2019-12-02 NOTE — TELEPHONE ENCOUNTER
PCP: Sangita Baig MD    Last appt: 10/14/2019  No future appointments.     Requested Prescriptions     Pending Prescriptions Disp Refills    rosuvastatin (CRESTOR) 10 mg tablet [Pharmacy Med Name: ROSUVASTATIN 10MG TAB] 90 Tab 0     Sig: TAKE 1 TABLET BY MOUTH NIGHTLY

## 2019-12-03 RX ORDER — ROSUVASTATIN CALCIUM 10 MG/1
TABLET, COATED ORAL
Qty: 90 TAB | Refills: 1 | Status: SHIPPED | OUTPATIENT
Start: 2019-12-03 | End: 2020-05-29

## 2020-02-19 DIAGNOSIS — F41.9 ANXIETY DISORDER, UNSPECIFIED TYPE: ICD-10-CM

## 2020-02-19 NOTE — TELEPHONE ENCOUNTER
PCP: Dylan Hernandez MD    Last appt: 10/14/2019  No future appointments. Requested Prescriptions     Pending Prescriptions Disp Refills    ALPRAZolam (XANAX) 0.5 mg tablet [Pharmacy Med Name: ALPRAZolam 0.5 MG Oral Tablet] 30 Tab 0     Sig: TAKE 1 TABLET BY MOUTH ONCE DAILY .  DO NOT EXCEED 1 TABLET IN 24 HRS   last refill 01/22/2020 per   Left message to schedule appt

## 2020-02-20 ENCOUNTER — TELEPHONE (OUTPATIENT)
Dept: FAMILY MEDICINE CLINIC | Age: 70
End: 2020-02-20

## 2020-02-20 RX ORDER — ALPRAZOLAM 0.5 MG/1
TABLET ORAL
Qty: 30 TAB | Refills: 0 | Status: SHIPPED | OUTPATIENT
Start: 2020-02-20 | End: 2020-03-19

## 2020-02-20 NOTE — TELEPHONE ENCOUNTER
PCP: Dylan Hernandez MD    Last appt: 10/14/2019  Future Appointments   Date Time Provider Anamaria Espinal   2/24/2020  1:45 PM MD SKYLER Qureshi   10/21/2020  2:30 PM MD SKYLER Qureshi SCHED       Requested Prescriptions     Pending Prescriptions Disp Refills    ALPRAZolam Jenny Snow) 0.5 mg tablet [Pharmacy Med Name: ALPRAZolam 0.5 MG Oral Tablet] 30 Tab 0     Sig: TAKE 1 TABLET BY MOUTH ONCE DAILY .  DO NOT EXCEED 1 TABLET IN 24 HRS

## 2020-02-20 NOTE — TELEPHONE ENCOUNTER
Needs to schedule an  office visit every 6 mths. . Once visit is scheduled we can refill medication until appointment.

## 2020-02-20 NOTE — TELEPHONE ENCOUNTER
----- Message from Rodrigo Wray sent at 2020  9:32 AM EST -----  Regarding: Dr. Kelvin Crawley telephone  Appointment not available    Caller's first and last name and relationship to patient (if not the patient):      Best contact [phone]      Preferred date and time: anytime in the next month       Scheduled appointment date and time: none available in pts time frame       Reason for appointment:CPE       Details to clarify the request:      Rodrigo Wray      . [facility] call.  verified. Let the patient know the next available for CPE is 2020. Pt stated that she needs her anxiety medication refilled and cant wait that long, pt also stated that she spoke with the nurse Wenceslao Aguirre, pt stated that she sent a message over to Dr. Nahid Rojas.

## 2020-02-24 ENCOUNTER — HOSPITAL ENCOUNTER (OUTPATIENT)
Dept: LAB | Age: 70
Discharge: HOME | End: 2020-02-24
Payer: MEDICARE

## 2020-02-24 ENCOUNTER — OFFICE VISIT (OUTPATIENT)
Dept: FAMILY MEDICINE CLINIC | Age: 70
End: 2020-02-24

## 2020-02-24 VITALS
HEART RATE: 78 BPM | RESPIRATION RATE: 16 BRPM | DIASTOLIC BLOOD PRESSURE: 70 MMHG | BODY MASS INDEX: 24.63 KG/M2 | TEMPERATURE: 98.2 F | SYSTOLIC BLOOD PRESSURE: 132 MMHG | OXYGEN SATURATION: 99 % | HEIGHT: 63 IN | WEIGHT: 139 LBS

## 2020-02-24 DIAGNOSIS — Z87.891 FORMER SMOKER: ICD-10-CM

## 2020-02-24 DIAGNOSIS — E78.5 HYPERLIPIDEMIA LDL GOAL <130: Primary | ICD-10-CM

## 2020-02-24 DIAGNOSIS — B00.1 FEVER BLISTER: ICD-10-CM

## 2020-02-24 DIAGNOSIS — F41.8 DEPRESSION WITH ANXIETY: ICD-10-CM

## 2020-02-24 DIAGNOSIS — E55.9 VITAMIN D DEFICIENCY: ICD-10-CM

## 2020-02-24 DIAGNOSIS — R31.1 BENIGN MICROSCOPIC HEMATURIA: ICD-10-CM

## 2020-02-24 DIAGNOSIS — F41.9 ANXIETY DISORDER, UNSPECIFIED TYPE: ICD-10-CM

## 2020-02-24 DIAGNOSIS — K21.9 GERD WITHOUT ESOPHAGITIS: ICD-10-CM

## 2020-02-24 PROCEDURE — 80053 COMPREHEN METABOLIC PANEL: CPT

## 2020-02-24 PROCEDURE — 80061 LIPID PANEL: CPT

## 2020-02-24 RX ORDER — OMEPRAZOLE 20 MG/1
20 CAPSULE, DELAYED RELEASE ORAL DAILY
COMMUNITY
End: 2021-06-01 | Stop reason: SDUPTHER

## 2020-02-24 NOTE — PROGRESS NOTES
HISTORY OF PRESENT ILLNESS  HPI  Jazmyn Lord is a 71 y.o. Female with a history of postmenopausal HRT, fever blister, depression with anxiety, leg cramps and hyperlipidemia, who presents to the office today for a follow up on her cholesterol. Pt says she is taking the Celexa once a day, and the Xanax once a day at night. Pt notes she has been on this dose for a couple of years. Pt mentions doing a \"heart check\" regularly with no issue. Pt reports taking naproxen nightly for a lingering hip pain. Pt states she has a physical scheduled for October. Pt denies unusual SOB, chest pain, and any recent ER visits or hospitalizations. Past Medical History:   Diagnosis Date    Anxiety     Benign microscopic hematuria- most recent w/u neg in 2016 2/14/2017    Depression     Fever blister 4/19/2018    mainly upper lip, chronic    Former smoker 03/11/2010    stopped 2005    Hyperlipidemia 4/14/2014    Leg cramps 3/11/2010    Postmenopausal HRT (hormone replacement therapy) 3/11/2010    stopped 2010     Past Surgical History:   Procedure Laterality Date    HX BREAST BIOPSY Right yrs ago    neg; u/s guided    HX GI      hemmorhoidectomy, fissure     Current Outpatient Medications on File Prior to Visit   Medication Sig Dispense Refill    omeprazole (PRILOSEC) 20 mg capsule Take 20 mg by mouth daily.  ALPRAZolam (XANAX) 0.5 mg tablet TAKE 1 TABLET BY MOUTH ONCE DAILY . DO NOT EXCEED 1 TABLET IN 24 HRS 30 Tab 0    rosuvastatin (CRESTOR) 10 mg tablet TAKE 1 TABLET BY MOUTH NIGHTLY 90 Tab 1    citalopram (CELEXA) 40 mg tablet TAKE 1 TABLET BY MOUTH ONCE DAILY 90 Tab 1    valACYclovir (VALTREX) 500 mg tablet TAKE ONE TABLET BY MOUTH TWICE DAILY FOR  1  WEEK,  THEN  ONCE  DAILY  TO  PREVENT 90 Tab 3    naproxen (NAPROSYN) 250 mg tablet Take  by mouth two (2) times daily (with meals). No current facility-administered medications on file prior to visit.       Allergies   Allergen Reactions  Wellbutrin [Bupropion] Unable to Obtain     Family History   Problem Relation Age of Onset    Stroke Mother 80    Cancer Father 52        larynx    Cancer Sister         BRCA    Breast Cancer Sister 48    Diabetes Brother     Diabetes Sister     Psychiatric Disorder Sister         Gae Klinefelter Breast Cancer Niece     Heart Attack Maternal Grandmother 72     Social History     Socioeconomic History    Marital status:      Spouse name: Not on file    Number of children: Not on file    Years of education: Not on file    Highest education level: Not on file   Tobacco Use    Smoking status: Former Smoker     Packs/day: 1.00     Years: 30.00     Pack years: 30.00     Types: Cigarettes     Last attempt to quit: 1/1/2005     Years since quitting: 15.1    Smokeless tobacco: Never Used   Substance and Sexual Activity    Alcohol use: Yes     Alcohol/week: 0.0 standard drinks     Comment: ocassional    Drug use: No    Sexual activity: Yes     Partners: Male             Review of Systems   Constitutional: Negative for chills, diaphoresis, fever, malaise/fatigue and weight loss. Eyes: Negative for blurred vision, double vision, pain and redness. Respiratory: Negative for cough, shortness of breath and wheezing. Cardiovascular: Negative for chest pain, palpitations, orthopnea, claudication, leg swelling and PND. Skin: Negative for itching and rash. Neurological: Negative for dizziness, tingling, tremors, sensory change, speech change, focal weakness, seizures, loss of consciousness, weakness and headaches.      Results for orders placed or performed in visit on 02/24/20   LIPID PANEL   Result Value Ref Range    Cholesterol, total 160 100 - 199 mg/dL    Triglyceride 81 0 - 149 mg/dL    HDL Cholesterol 64 >39 mg/dL    VLDL, calculated 16 5 - 40 mg/dL    LDL, calculated 80 0 - 99 mg/dL   METABOLIC PANEL, COMPREHENSIVE   Result Value Ref Range    Glucose 139 (H) 65 - 99 mg/dL    BUN 14 8 - 27 mg/dL    Creatinine 0.81 0.57 - 1.00 mg/dL    GFR est non-AA 74 >59 mL/min/1.73    GFR est AA 86 >59 mL/min/1.73    BUN/Creatinine ratio 17 12 - 28    Sodium 142 134 - 144 mmol/L    Potassium 4.0 3.5 - 5.2 mmol/L    Chloride 105 96 - 106 mmol/L    CO2 22 20 - 29 mmol/L    Calcium 9.5 8.7 - 10.3 mg/dL    Protein, total 6.5 6.0 - 8.5 g/dL    Albumin 4.4 3.8 - 4.8 g/dL    GLOBULIN, TOTAL 2.1 1.5 - 4.5 g/dL    A-G Ratio 2.1 1.2 - 2.2    Bilirubin, total 0.2 0.0 - 1.2 mg/dL    Alk. phosphatase 61 39 - 117 IU/L    AST (SGOT) 15 0 - 40 IU/L    ALT (SGPT) 13 0 - 32 IU/L   CVD REPORT   Result Value Ref Range    INTERPRETATION Note          Physical Exam  Visit Vitals  /70   Pulse 78   Temp 98.2 °F (36.8 °C)   Resp 16   Ht 5' 3\" (1.6 m)   Wt 139 lb (63 kg)   LMP 01/01/2000   SpO2 99%   BMI 24.62 kg/m²       Head: Normocephalic, without obvious abnormality, atraumatic  Eyes: conjunctivae/corneas clear. PERRL, EOM's intact. Neck: supple, symmetrical, trachea midline, no adenopathy, thyroid: not enlarged, symmetric, no tenderness/mass/nodules, no carotid bruit and no JVD  Lungs: clear to auscultation bilaterally  Heart: regular rate and rhythm, S1, S2 normal, no murmur, click, rub or gallop  Extremities: extremities normal, atraumatic, no cyanosis or edema  Pulses: 2+ and symmetric  Lymph nodes: Cervical, supraclavicular, and axillary nodes normal.  Neurologic: Grossly normal      ASSESSMENT and PLAN    ICD-10-CM ICD-9-CM    1. Hyperlipidemia LDL goal <130 E78.5 272.4 LIPID PANEL      METABOLIC PANEL, COMPREHENSIVE   2. Anxiety disorder, unspecified type F41.9 300.00    3. Depression with anxiety F41.8 300.4    4. Vitamin D deficiency E55.9 268.9    5. Fever blister- multiple locations, recurring ~ once a month for years B00.1 054.9    6. Former smoker Z87.891 V15.82     stopped 1/1/2005   7. Benign microscopic hematuria- most recent w/u neg in 2016 R31.1 599.72    8.  GERD without esophagitis K21.9 530.81 omeprazole (PRILOSEC) 20 mg capsule     Diagnoses and all orders for this visit:    1. Hyperlipidemia LDL goal <130  -     LIPID PANEL  -     METABOLIC PANEL, COMPREHENSIVE    2. Anxiety disorder, unspecified type    3. Depression with anxiety    4. Vitamin D deficiency    5. Fever blister- multiple locations, recurring ~ once a month for years    6. Former smoker  Comments:  stopped 1/1/2005    7. Benign microscopic hematuria- most recent w/u neg in 2016    8. GERD without esophagitis    Other orders  -     CVD REPORT      Follow-up and Dispositions    · Return in about 3 months (around 5/24/2020), or AVILA, for 3 month F/U chronic controlled substance use, every 6 months for cholesterol. lab results and schedule of future lab studies reviewed with patient  reviewed diet, exercise and weight control  cardiovascular risk and specific lipid/LDL goals reviewed  reviewed medications and side effects in detail  Please call my office if there are any questions- [phone]. I will arrange for follow up after the lab tests done from today return  Recommended a weekly \"heart check. \" I went into detail how to do this. Call for refills on medications as needed. Discussed expected course/resolution/complications of diagnosis in detail with patient. Medication risks/benefits/costs/interactions/alternatives discussed with patient. Pt was given an after visit summary which includes diagnoses, current medications & vitals. Pt expressed understanding with the diagnosis and plan    Total 25 minutes,60 % counseling re: Reviewed symptoms, or lack thereof, of hypertension and elevated cholesterol. Recommended a weekly \"heart check. \" I went into detail how to do this. Regular exercise is very important to your health; it helps mentally, physically, socially; it prevents injuries if done properly.   Exercise, even as simple as walking 20-30 minutes daily has major benefits to your health even though your \"numbers\" are the same in the lab. See if you can add this into your daily regimen and after a few months it will become a regular habit-\"just something you do,\" like brushing your teeth. A combination of aerobic exercise and strengthening and stretching is felt to be the best for you, so this should be your ultimate goal.   This can be done in the privacy of your home or in a group setting as at the gym  Some prefer having a , others prefer to do exercise in groups or individually. Do what \"works\" for you. You need to make it simple and \"fun,\" or you most likely will not continue it. Also, discussed symptoms of concern that were noted today in the note above, treatment options( including doing nothing), when to follow up before recommended time frame. Also, answered all questions.  checked and found to have no suspicious activity. Signed and agreed to the controlled substance agreement in the past year; copy is in the chart. Long discussion about chronic benzodiazepine use, the risks of addiction, tolerance , overdose,etc. The national spotlight on this problem and the need for a controlled substance agreement in order to continue receiving these, etc.  Continue to do the non-medication things that reduce anxiety such as adequate sleep, avoiding high impact activities, but at the same time staying active and avoiding sedentary activity. In addition, the patient was counseled today on the potential risks of benzodiazepine use including but not limited to death if not taken as prescribed or if taken in conjunction with other sedative, hypnotic, or pain medications, and the need to refrain from any recreational drugs and/or alcohol. Informed pt that the cholesterol medication is protecting your cardiovascular system even if the cholesterol level is normal. Encouraged her to remain a non-smoker, she was a regular smoker until 2005.     This document was written by Irene Cason, as dictated by Trevon Appiah, MD.  I have reviewed and agree with the above note and have made corrections where appropriate Noé Hansen M.D.

## 2020-02-24 NOTE — PROGRESS NOTES
Chief Complaint   Patient presents with    Cholesterol Problem   1. Have you been to the ER, urgent care clinic since your last visit? Hospitalized since your last visit? No    2. Have you seen or consulted any other health care providers outside of the [address] [place] [address] since your last visit? Include any pap smears or colon screening.  No

## 2020-02-25 DIAGNOSIS — F41.9 ANXIETY DISORDER, UNSPECIFIED TYPE: ICD-10-CM

## 2020-02-25 LAB
ALBUMIN SERPL-MCNC: 4.4 G/DL (ref 3.8–4.8)
ALBUMIN/GLOB SERPL: 2.1 {RATIO} (ref 1.2–2.2)
ALP SERPL-CCNC: 61 IU/L (ref 39–117)
ALT SERPL-CCNC: 13 IU/L (ref 0–32)
AST SERPL-CCNC: 15 IU/L (ref 0–40)
BILIRUB SERPL-MCNC: 0.2 MG/DL (ref 0–1.2)
BUN SERPL-MCNC: 14 MG/DL (ref 8–27)
BUN/CREAT SERPL: 17 (ref 12–28)
CALCIUM SERPL-MCNC: 9.5 MG/DL (ref 8.7–10.3)
CHLORIDE SERPL-SCNC: 105 MMOL/L (ref 96–106)
CHOLEST SERPL-MCNC: 160 MG/DL (ref 100–199)
CO2 SERPL-SCNC: 22 MMOL/L (ref 20–29)
CREAT SERPL-MCNC: 0.81 MG/DL (ref 0.57–1)
GLOBULIN SER CALC-MCNC: 2.1 G/DL (ref 1.5–4.5)
GLUCOSE SERPL-MCNC: 139 MG/DL (ref 65–99)
HDLC SERPL-MCNC: 64 MG/DL
INTERPRETATION, 910389: NORMAL
LDLC SERPL CALC-MCNC: 80 MG/DL (ref 0–99)
POTASSIUM SERPL-SCNC: 4 MMOL/L (ref 3.5–5.2)
PROT SERPL-MCNC: 6.5 G/DL (ref 6–8.5)
SODIUM SERPL-SCNC: 142 MMOL/L (ref 134–144)
TRIGL SERPL-MCNC: 81 MG/DL (ref 0–149)
VLDLC SERPL CALC-MCNC: 16 MG/DL (ref 5–40)

## 2020-02-25 NOTE — TELEPHONE ENCOUNTER
PCP: Chase Aguillon MD    Last appt: 2/24/2020  Future Appointments   Date Time Provider Anamaria Espinal   10/21/2020  2:30 PM Chase Aguillon MD [facility] MAUL[facility] SCHED       Requested Prescriptions     Pending Prescriptions Disp Refills    citalopram (CELEXA) 40 mg tablet [Pharmacy Med Name: Citalopram Hydrobromide 40 MG Oral Tablet] 90 Tab 0     Sig: TAKE 1 TABLET BY MOUTH ONCE DAILY

## 2020-02-26 RX ORDER — CITALOPRAM 40 MG/1
TABLET, FILM COATED ORAL
Qty: 90 TAB | Refills: 3 | Status: SHIPPED | OUTPATIENT
Start: 2020-02-26 | End: 2021-02-21

## 2020-03-18 DIAGNOSIS — F41.9 ANXIETY DISORDER, UNSPECIFIED TYPE: ICD-10-CM

## 2020-03-19 RX ORDER — ALPRAZOLAM 0.5 MG/1
TABLET ORAL
Qty: 30 TAB | Refills: 0 | Status: SHIPPED | OUTPATIENT
Start: 2020-03-19 | End: 2020-04-18

## 2020-04-17 DIAGNOSIS — F41.9 ANXIETY DISORDER, UNSPECIFIED TYPE: ICD-10-CM

## 2020-04-17 NOTE — TELEPHONE ENCOUNTER
Last refill 03/19/2020 per   PCP: Mara Gonzalez MD    Last appt: 2/24/2020  Future Appointments   Date Time Provider Anamaria Espinal   10/21/2020  2:30 PM Mara Gonzalez MD [facility] SCHED       Requested Prescriptions     Pending Prescriptions Disp Refills    ALPRAZolam (XANAX) 0.5 mg tablet [Pharmacy Med Name: ALPRAZolam 0.5 MG Oral Tablet] 30 Tab 0     Sig: TAKE 1 TABLET BY MOUTH ONCE DAILY .  DO NOT EXCEED 1 PER 24 HOURS

## 2020-04-18 RX ORDER — ALPRAZOLAM 0.5 MG/1
TABLET ORAL
Qty: 30 TAB | Refills: 0 | Status: SHIPPED | OUTPATIENT
Start: 2020-04-18 | End: 2020-05-19

## 2020-05-19 DIAGNOSIS — F41.9 ANXIETY DISORDER, UNSPECIFIED TYPE: ICD-10-CM

## 2020-05-19 RX ORDER — ALPRAZOLAM 0.5 MG/1
TABLET ORAL
Qty: 30 TAB | Refills: 2 | Status: SHIPPED | OUTPATIENT
Start: 2020-05-19 | End: 2020-08-14

## 2020-05-29 DIAGNOSIS — E78.5 HYPERLIPIDEMIA, UNSPECIFIED HYPERLIPIDEMIA TYPE: ICD-10-CM

## 2020-05-29 RX ORDER — ROSUVASTATIN CALCIUM 10 MG/1
TABLET, COATED ORAL
Qty: 90 TAB | Refills: 0 | Status: SHIPPED | OUTPATIENT
Start: 2020-05-29 | End: 2020-08-31 | Stop reason: SDUPTHER

## 2020-07-31 DIAGNOSIS — B00.1 FEVER BLISTER: ICD-10-CM

## 2020-07-31 RX ORDER — VALACYCLOVIR HYDROCHLORIDE 500 MG/1
TABLET, FILM COATED ORAL
Qty: 30 TAB | Refills: 3 | Status: SHIPPED | OUTPATIENT
Start: 2020-07-31 | End: 2020-11-30

## 2020-08-14 DIAGNOSIS — F41.9 ANXIETY DISORDER, UNSPECIFIED TYPE: ICD-10-CM

## 2020-08-14 RX ORDER — ALPRAZOLAM 0.5 MG/1
TABLET ORAL
Qty: 30 TAB | Refills: 0 | Status: SHIPPED | OUTPATIENT
Start: 2020-08-14 | End: 2020-09-15

## 2020-08-31 DIAGNOSIS — E78.5 HYPERLIPIDEMIA, UNSPECIFIED HYPERLIPIDEMIA TYPE: ICD-10-CM

## 2020-08-31 RX ORDER — ROSUVASTATIN CALCIUM 10 MG/1
TABLET, COATED ORAL
Qty: 90 TAB | Refills: 0 | Status: SHIPPED | OUTPATIENT
Start: 2020-08-31 | End: 2020-11-29

## 2020-09-15 DIAGNOSIS — F41.9 ANXIETY DISORDER, UNSPECIFIED TYPE: ICD-10-CM

## 2020-09-15 RX ORDER — ALPRAZOLAM 0.5 MG/1
TABLET ORAL
Qty: 30 TAB | Refills: 1 | Status: SHIPPED | OUTPATIENT
Start: 2020-09-15 | End: 2020-11-12

## 2020-11-02 ENCOUNTER — OFFICE VISIT (OUTPATIENT)
Dept: FAMILY MEDICINE CLINIC | Age: 70
End: 2020-11-02
Payer: MEDICARE

## 2020-11-02 VITALS
WEIGHT: 134.4 LBS | HEART RATE: 72 BPM | HEIGHT: 63 IN | OXYGEN SATURATION: 99 % | SYSTOLIC BLOOD PRESSURE: 120 MMHG | DIASTOLIC BLOOD PRESSURE: 78 MMHG | RESPIRATION RATE: 18 BRPM | TEMPERATURE: 98.8 F | BODY MASS INDEX: 23.81 KG/M2

## 2020-11-02 DIAGNOSIS — B00.1 FEVER BLISTER: ICD-10-CM

## 2020-11-02 DIAGNOSIS — E78.5 HYPERLIPIDEMIA LDL GOAL <130: Primary | ICD-10-CM

## 2020-11-02 DIAGNOSIS — Z87.891 FORMER SMOKER: ICD-10-CM

## 2020-11-02 DIAGNOSIS — E55.9 VITAMIN D DEFICIENCY: ICD-10-CM

## 2020-11-02 DIAGNOSIS — K21.9 GERD WITHOUT ESOPHAGITIS: ICD-10-CM

## 2020-11-02 DIAGNOSIS — F41.9 ANXIETY DISORDER, UNSPECIFIED TYPE: ICD-10-CM

## 2020-11-02 DIAGNOSIS — Z71.89 ACP (ADVANCE CARE PLANNING): ICD-10-CM

## 2020-11-02 DIAGNOSIS — R31.1 BENIGN MICROSCOPIC HEMATURIA: ICD-10-CM

## 2020-11-02 DIAGNOSIS — Z00.00 MEDICARE ANNUAL WELLNESS VISIT, SUBSEQUENT: ICD-10-CM

## 2020-11-02 DIAGNOSIS — F41.8 DEPRESSION WITH ANXIETY: ICD-10-CM

## 2020-11-02 PROCEDURE — G8427 DOCREV CUR MEDS BY ELIG CLIN: HCPCS | Performed by: FAMILY MEDICINE

## 2020-11-02 PROCEDURE — 1090F PRES/ABSN URINE INCON ASSESS: CPT | Performed by: FAMILY MEDICINE

## 2020-11-02 PROCEDURE — G9717 DOC PT DX DEP/BP F/U NT REQ: HCPCS | Performed by: FAMILY MEDICINE

## 2020-11-02 PROCEDURE — 99214 OFFICE O/P EST MOD 30 MIN: CPT | Performed by: FAMILY MEDICINE

## 2020-11-02 PROCEDURE — G9899 SCRN MAM PERF RSLTS DOC: HCPCS | Performed by: FAMILY MEDICINE

## 2020-11-02 PROCEDURE — 3017F COLORECTAL CA SCREEN DOC REV: CPT | Performed by: FAMILY MEDICINE

## 2020-11-02 PROCEDURE — G0463 HOSPITAL OUTPT CLINIC VISIT: HCPCS | Performed by: FAMILY MEDICINE

## 2020-11-02 PROCEDURE — G8536 NO DOC ELDER MAL SCRN: HCPCS | Performed by: FAMILY MEDICINE

## 2020-11-02 PROCEDURE — G8399 PT W/DXA RESULTS DOCUMENT: HCPCS | Performed by: FAMILY MEDICINE

## 2020-11-02 PROCEDURE — G8420 CALC BMI NORM PARAMETERS: HCPCS | Performed by: FAMILY MEDICINE

## 2020-11-02 PROCEDURE — G0439 PPPS, SUBSEQ VISIT: HCPCS | Performed by: FAMILY MEDICINE

## 2020-11-02 PROCEDURE — 1101F PT FALLS ASSESS-DOCD LE1/YR: CPT | Performed by: FAMILY MEDICINE

## 2020-11-02 NOTE — PROGRESS NOTES
Chief Complaint   Patient presents with    Anxiety    Cholesterol Problem    Annual Wellness Visit     due eligible for        1. Have you been to the ER, urgent care clinic since your last visit? Hospitalized since your last visit? No    2. Have you seen or consulted any other health care providers outside of the [address] [place] [address] since your last visit? Include any pap smears or colon screening.  No

## 2020-11-02 NOTE — PROGRESS NOTES
HISTORY OF PRESENT ILLNESS. HPI  Nicholas Rosado a 79 y.o. Female with a history of postmenopausal HRT, fever blister, depression with anxiety, leg cramps and hyperlipidemia, who presents to the office today for a follow up on her cholesterol and anxiety and an annual wellness visit. 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 Pt says that she will f/u with her dermatologist for this. Pt denies unusual SOB, chest pain, and any recent ER visits or hospitalizations. Past Medical History:   Diagnosis Date    Anxiety     Benign microscopic hematuria- most recent w/u neg in 2016 2/14/2017    Depression     Fever blister 4/19/2018    mainly upper lip, chronic    Former smoker 03/11/2010    stopped 2005    Hyperlipidemia 4/14/2014    Leg cramps 3/11/2010    Postmenopausal HRT (hormone replacement therapy) 3/11/2010    stopped 2010     Past Surgical History:   Procedure Laterality Date    HX BREAST BIOPSY Right yrs ago    neg; u/s guided    HX GI      hemmorhoidectomy, fissure    HX OTHER SURGICAL      sq cell L lower leg- 2020     Current Outpatient Medications on File Prior to Visit   Medication Sig Dispense Refill    ALPRAZolam (XANAX) 0.5 mg tablet TAKE 1 TABLET BY MOUTH ONCE DAILY . DO NOT EXCEED 1 PER 24 HOURS 30 Tab 1    rosuvastatin (CRESTOR) 10 mg tablet Take 1 tablet by mouth nightly 90 Tab 0    valACYclovir (VALTREX) 500 mg tablet TAKE ONE TABLET BY MOUTH TWICE DAILY FOR 1 WEEK, THEN TAKE ONCE DAILY TO PREVENT  Indications: prevent recurrent herpes simplex infection 30 Tab 3    citalopram (CELEXA) 40 mg tablet TAKE 1 TABLET BY MOUTH ONCE DAILY 90 Tab 3    omeprazole (PRILOSEC) 20 mg capsule Take 20 mg by mouth daily.  naproxen (NAPROSYN) 250 mg tablet Take  by mouth two (2) times daily (with meals). No current facility-administered medications on file prior to visit.       Allergies   Allergen Reactions    Wellbutrin [Bupropion] Unable to Obtain     Family History   Problem Relation Age of Onset    Stroke Mother 80    Cancer Father 52        larynx    Cancer Sister         BRCA    Breast Cancer Sister 48    Diabetes Brother     Diabetes Sister     Psychiatric Disorder Sister         Cancino Miracle Breast Cancer Niece     Heart Attack Maternal Grandmother 72     Social History     Socioeconomic History    Marital status:      Spouse name: Not on file    Number of children: Not on file    Years of education: Not on file    Highest education level: Not on file   Tobacco Use    Smoking status: Former Smoker     Packs/day: 1.00     Years: 30.00     Pack years: 30.00     Types: Cigarettes     Last attempt to quit: 1/1/2005     Years since quitting: 15.8    Smokeless tobacco: Never Used   Substance and Sexual Activity    Alcohol use: Yes     Alcohol/week: 0.0 standard drinks     Comment: ocassional    Drug use: No    Sexual activity: Yes     Partners: Male              Review of Systems   Constitutional: Negative for chills, diaphoresis, fever, malaise/fatigue and weight loss. Eyes: Negative for blurred vision, double vision, pain and redness. Respiratory: Negative for cough, shortness of breath and wheezing. Cardiovascular: Negative for chest pain, palpitations, orthopnea, claudication, leg swelling and PND. Skin: Negative for itching and rash. Neurological: Negative for dizziness, tingling, tremors, sensory change, speech change, focal weakness, seizures, loss of consciousness, weakness and headaches.      Results for orders placed or performed in visit on 02/24/20   LIPID PANEL   Result Value Ref Range    Cholesterol, total 160 100 - 199 mg/dL    Triglyceride 81 0 - 149 mg/dL    HDL Cholesterol 64 >39 mg/dL    VLDL, calculated 16 5 - 40 mg/dL    LDL, calculated 80 0 - 99 mg/dL   METABOLIC PANEL, COMPREHENSIVE   Result Value Ref Range    Glucose 139 (H) 65 - 99 mg/dL    BUN 14 8 - 27 mg/dL    Creatinine 0.81 0.57 - 1.00 mg/dL    GFR est non-AA 74 >59 mL/min/1.73    GFR est AA 86 >59 mL/min/1.73    BUN/Creatinine ratio 17 12 - 28    Sodium 142 134 - 144 mmol/L    Potassium 4.0 3.5 - 5.2 mmol/L    Chloride 105 96 - 106 mmol/L    CO2 22 20 - 29 mmol/L    Calcium 9.5 8.7 - 10.3 mg/dL    Protein, total 6.5 6.0 - 8.5 g/dL    Albumin 4.4 3.8 - 4.8 g/dL    GLOBULIN, TOTAL 2.1 1.5 - 4.5 g/dL    A-G Ratio 2.1 1.2 - 2.2    Bilirubin, total 0.2 0.0 - 1.2 mg/dL    Alk. phosphatase 61 39 - 117 IU/L    AST (SGOT) 15 0 - 40 IU/L    ALT (SGPT) 13 0 - 32 IU/L   CVD REPORT   Result Value Ref Range    INTERPRETATION Note            Physical Exam  Visit Vitals  /78 (BP 1 Location: Left arm, BP Patient Position: Sitting)   Pulse 72   Temp 98.8 °F (37.1 °C) (Temporal)   Resp 18   Ht 5' 3\" (1.6 m)   Wt 134 lb 6.4 oz (61 kg)   LMP 01/01/2000   SpO2 99%   BMI 23.81 kg/m²         Head: Normocephalic, without obvious abnormality, atraumatic  Eyes: conjunctivae/corneas clear. PERRL, EOM's intact. Neck: supple, symmetrical, trachea midline, no adenopathy, thyroid: not enlarged, symmetric, no tenderness/mass/nodules, no carotid bruit and no JVD  Lungs: clear to auscultation bilaterally  Heart: regular rate and rhythm, S1, S2 normal, no murmur, click, rub or gallop  Extremities: extremities normal, atraumatic, no cyanosis or edema  Pulses: 2+ and symmetric  Lymph nodes: Cervical, supraclavicular, and axillary nodes normal.  Neurologic: Grossly normal          ASSESSMENT and PLAN    ICD-10-CM ICD-9-CM    1. Hyperlipidemia LDL goal <130  E78.5 272.4 LIPID PANEL      METABOLIC PANEL, COMPREHENSIVE   2. Anxiety disorder, unspecified type  F41.9 300.00    3. Former smoker  Z87.891 V15.82    4. GERD without esophagitis  K21.9 530.81    5. Fever blister- multiple locations, recurring ~ once a month for years  B00.1 054.9    6. Benign microscopic hematuria- most recent w/u neg in 2016  R31.1 599.72    7.  Vitamin D deficiency  E55.9 268.9    8. Medicare annual wellness visit, subsequent  Z00.00 V70.0    9. ACP (advance care planning)  Z71.89 V65.49    10. Depression with anxiety  F41.8 300.4      Diagnoses and all orders for this visit:    1. Hyperlipidemia LDL goal <130  -     LIPID PANEL; Future  -     METABOLIC PANEL, COMPREHENSIVE; Future    2. Anxiety disorder, unspecified type    3. Former smoker    4. GERD without esophagitis    5. Fever blister- multiple locations, recurring ~ once a month for years    6. Benign microscopic hematuria- most recent w/u neg in 2016    7. Vitamin D deficiency    8. Medicare annual wellness visit, subsequent    9. ACP (advance care planning)    10. Depression with anxiety      Follow-up and Dispositions    · Return in about 6 months (around 5/2/2021) for F/U cholesterol, anxiety/depression, leg cramps. lab results and schedule of future lab studies reviewed with patient  reviewed diet, exercise and weight control  cardiovascular risk and specific lipid/LDL goals reviewed  reviewed medications and side effects in detail  Please call my office if there are any questions- [phone]. I will arrange for follow up after the lab tests done from today return  Recommended a weekly \"heart check. \" I went into detail how to do this. Call for refills on medications as needed. Discussed expected course/resolution/complications of diagnosis in detail with patient. Medication risks/benefits/costs/interactions/alternatives discussed with patient. Pt was given an after visit summary which includes diagnoses, current medications & vitals. Pt expressed understanding with the diagnosis and plan      Total 35 minutes,60 % counseling re: Recommended a weekly \"heart check. \" I went into detail how to do this. Regular exercise is very important to your health; it helps mentally, physically, socially; it prevents injuries if done properly.   Exercise, even as simple as walking 20-30 minutes daily has major benefits to your health even though your \"numbers\" are the same in the lab. See if you can add this into your daily regimen and after a few months it will become a regular habit-\"just something you do,\" like brushing your teeth. A combination of aerobic exercise and strengthening and stretching is felt to be the best for you, so this should be your ultimate goal.   This can be done in the privacy of your home or in a group setting as at the gym  Some prefer having a , others prefer to do exercise in groups or individually. Do what \"works\" for you. You need to make it simple and \"fun,\" or you most likely will not continue it. Reviewed symptoms, or lack thereof, of hypertension and elevated cholesterol. Also, discussed symptoms of concern that were noted today in the note above, treatment options( including doing nothing), when to follow up before recommended time frame. Also, answered all questions. I encouraged pt to complete her advanced directives and get that back to us. I answered her questions related to that. I congratulated her on her weight loss and continued non-smoking. She stopped back in 2005. She should have her surgeon send a copy of the pathology report related to her left lower leg squamous cell. This document was written by Bogdan Prieto, as dictated by Yuliya Bennett MD.  I have reviewed and agree with the above note and have made corrections where appropriate Noé Malone M.D. This is the Subsequent Medicare Annual Wellness Exam, performed 12 months or more after the Initial AWV or the last Subsequent AWV    I have reviewed the patient's medical history in detail and updated the computerized patient record.      History     Patient Active Problem List   Diagnosis Code    Former smoker Z87.891    Postmenopausal HRT (hormone replacement therapy) Z79.890    Leg cramps R25.2    Depression with anxiety F41.8    Hyperlipidemia LDL goal <130 E78.5  Pre-diabetes R73.03    Benign microscopic hematuria- most recent w/u neg in 2016 R31.1    Fever blister B00.1     Past Medical History:   Diagnosis Date    Anxiety     Benign microscopic hematuria- most recent w/u neg in 2016 2/14/2017    Depression     Fever blister 4/19/2018    mainly upper lip, chronic    Former smoker 03/11/2010    stopped 2005    Hyperlipidemia 4/14/2014    Leg cramps 3/11/2010    Postmenopausal HRT (hormone replacement therapy) 3/11/2010    stopped 2010      Past Surgical History:   Procedure Laterality Date    HX BREAST BIOPSY Right yrs ago    neg; u/s guided    HX GI      hemmorhoidectomy, fissure    HX OTHER SURGICAL      sq cell L lower leg- 2020     Current Outpatient Medications   Medication Sig Dispense Refill    ALPRAZolam (XANAX) 0.5 mg tablet TAKE 1 TABLET BY MOUTH ONCE DAILY . DO NOT EXCEED 1 PER 24 HOURS 30 Tab 1    rosuvastatin (CRESTOR) 10 mg tablet Take 1 tablet by mouth nightly 90 Tab 0    valACYclovir (VALTREX) 500 mg tablet TAKE ONE TABLET BY MOUTH TWICE DAILY FOR 1 WEEK, THEN TAKE ONCE DAILY TO PREVENT  Indications: prevent recurrent herpes simplex infection 30 Tab 3    citalopram (CELEXA) 40 mg tablet TAKE 1 TABLET BY MOUTH ONCE DAILY 90 Tab 3    omeprazole (PRILOSEC) 20 mg capsule Take 20 mg by mouth daily.  naproxen (NAPROSYN) 250 mg tablet Take  by mouth two (2) times daily (with meals).        Allergies   Allergen Reactions    Wellbutrin [Bupropion] Unable to Obtain       Family History   Problem Relation Age of Onset    Stroke Mother 80    Cancer Father 52        larynx    Cancer Sister         BRCA    Breast Cancer Sister 48    Diabetes Brother     Diabetes Sister     Psychiatric Disorder Sister         Quin Sridevi Breast Cancer Niece     Heart Attack Maternal Grandmother 72     Social History     Tobacco Use    Smoking status: Former Smoker     Packs/day: 1.00     Years: 30.00     Pack years: 30.00     Types: Cigarettes     Last attempt to quit: 1/1/2005     Years since quitting: 15.8    Smokeless tobacco: Never Used   Substance Use Topics    Alcohol use: Yes     Alcohol/week: 0.0 standard drinks     Comment: ocassional       Depression Risk Factor Screening:     3 most recent PHQ Screens 4/10/2019   Little interest or pleasure in doing things Not at all   Feeling down, depressed, irritable, or hopeless Not at all   Total Score PHQ 2 0       Alcohol Risk Screen   Do you average more than 1 drink per night or more than 7 drinks a week:  No    On any one occasion in the past three months have you have had more than 3 drinks containing alcohol:  No        Functional Ability and Level of Safety:   Hearing: Hearing is good. Activities of Daily Living: The home contains: no safety equipment. Patient does total self care     Ambulation: with no difficulty     Fall Risk:  Fall Risk Assessment, last 12 mths 2/24/2020   Able to walk? Yes   Fall in past 12 months? No   Fall with injury? -   Number of falls in past 12 months -   Fall Risk Score -     Abuse Screen:  Patient is not abused       Cognitive Screening   Has your family/caregiver stated any concerns about your memory: no   I reviewed advanced directive information and written information given to patient; patient to make follow up appointment with a NN for any questions,to review choices made for health care, agent, and anatomical gifts that are on the advanced directive at home. Cognitive Screening: Normal - Mini Cog Test    Patient Care Team   Patient Care Team:  Cameron Garcia MD as PCP - General  Cameron Garcia MD as PCP - [facility] Provider    Assessment/Plan   Education and counseling provided:  Are appropriate based on today's review and evaluation    Diagnoses and all orders for this visit:    1. Hyperlipidemia LDL goal <130  -     LIPID PANEL; Future  -     METABOLIC PANEL, COMPREHENSIVE; Future    2. Anxiety disorder, unspecified type    3. Former smoker    4.  GERD without esophagitis    5. Fever blister- multiple locations, recurring ~ once a month for years    6. Benign microscopic hematuria- most recent w/u neg in 2016    7. Vitamin D deficiency    8. Medicare annual wellness visit, subsequent    9. ACP (advance care planning)    10.  Depression with anxiety        Health Maintenance Due   Topic Date Due    Pneumococcal 65+ years (1 of 1 - PPSV23) 07/04/2015    Breast Cancer Screen Mammogram  01/25/2020    Medicare Yearly Exam  04/10/2020    Colorectal Cancer Screening Combo  11/02/2020

## 2020-11-08 NOTE — PATIENT INSTRUCTIONS
Medicare Wellness Visit, Female The best way to live healthy is to have a lifestyle where you eat a well-balanced diet, exercise regularly, limit alcohol use, and quit all forms of tobacco/nicotine, if applicable. Regular preventive services are another way to keep healthy. Preventive services (vaccines, screening tests, monitoring & exams) can help personalize your care plan, which helps you manage your own care. Screening tests can find health problems at the earliest stages, when they are easiest to treat. [facility] follows the current, evidence-based guidelines published by the [facility] E[facility] Armendariz ([facility]) when recommending preventive services for our patients. Because we follow these guidelines, sometimes recommendations change over time as research supports it. (For example, mammograms used to be recommended annually. Even though Medicare will still pay for an annual mammogram, the newer guidelines recommend a mammogram every two years for women of average risk). Of course, you and your doctor may decide to screen more often for some diseases, based on your risk and your co-morbidities (chronic disease you are already diagnosed with). Preventive services for you include: - Medicare offers their members a free annual wellness visit, which is time for you and your primary care provider to discuss and plan for your preventive service needs. Take advantage of this benefit every year! 
-All adults over the age of 72 should receive the recommended pneumonia vaccines. Current USPSTF guidelines recommend a series of two vaccines for the best pneumonia protection.  
-All adults should have a flu vaccine yearly and a tetanus vaccine every 10 years.  
-All adults age 48 and older should receive the shingles vaccines (series of two vaccines). -All adults age 38-68 who are overweight should have a diabetes screening test once every three years. -All adults born between [birth year] and 1965 should be screened once for Hepatitis C. 
-Other screening tests and preventive services for persons with diabetes include: an eye exam to screen for diabetic retinopathy, a kidney function test, a foot exam, and stricter control over your cholesterol.  
-Cardiovascular screening for adults with routine risk involves an electrocardiogram (ECG) at intervals determined by your doctor.  
-Colorectal cancer screenings should be done for adults age 54-65 with no increased risk factors for colorectal cancer. There are a number of acceptable methods of screening for this type of cancer. Each test has its own benefits and drawbacks. Discuss with your doctor what is most appropriate for you during your annual wellness visit. The different tests include: colonoscopy (considered the best screening method), a fecal occult blood test, a fecal DNA test, and sigmoidoscopy. 
 
-A bone mass density test is recommended when a woman turns 65 to screen for osteoporosis. This test is only recommended one time, as a screening. Some providers will use this same test as a disease monitoring tool if you already have osteoporosis. -Breast cancer screenings are recommended every other year for women of normal risk, age 54-69. 
-Cervical cancer screenings for women over age 72 are only recommended with certain risk factors. Here is a list of your current Health Maintenance items (your personalized list of preventive services) with a due date: 
Health Maintenance Due Topic Date Due  Pneumococcal Vaccine (1 of 1 - PPSV23) 07/04/2015  Mammogram  01/25/2020 [facility] Annual Well Visit  04/10/2020  Colorectal Screening  11/02/2020

## 2020-11-12 DIAGNOSIS — F41.9 ANXIETY DISORDER, UNSPECIFIED TYPE: ICD-10-CM

## 2020-11-12 RX ORDER — ALPRAZOLAM 0.5 MG/1
TABLET ORAL
Qty: 30 TAB | Refills: 4 | Status: SHIPPED | OUTPATIENT
Start: 2020-11-12 | End: 2021-03-31

## 2020-11-27 DIAGNOSIS — E78.5 HYPERLIPIDEMIA, UNSPECIFIED HYPERLIPIDEMIA TYPE: ICD-10-CM

## 2020-11-29 RX ORDER — ROSUVASTATIN CALCIUM 10 MG/1
TABLET, COATED ORAL
Qty: 90 TAB | Refills: 0 | Status: SHIPPED | OUTPATIENT
Start: 2020-11-29 | End: 2021-02-28

## 2020-11-30 DIAGNOSIS — B00.1 FEVER BLISTER: ICD-10-CM

## 2020-11-30 RX ORDER — VALACYCLOVIR HYDROCHLORIDE 500 MG/1
TABLET, FILM COATED ORAL
Qty: 30 TAB | Refills: 0 | Status: SHIPPED | OUTPATIENT
Start: 2020-11-30 | End: 2020-12-29

## 2020-12-29 DIAGNOSIS — B00.1 FEVER BLISTER: ICD-10-CM

## 2020-12-29 RX ORDER — VALACYCLOVIR HYDROCHLORIDE 500 MG/1
500 TABLET, FILM COATED ORAL DAILY
Qty: 90 TAB | Refills: 3 | Status: SHIPPED | OUTPATIENT
Start: 2020-12-29 | End: 2021-12-26

## 2021-01-28 NOTE — TELEPHONE ENCOUNTER
Last refill 01/10/18  Last visit 09/18/17 Solaraze Counseling:  I discussed with the patient the risks of Solaraze including but not limited to erythema, scaling, itching, weeping, crusting, and pain.

## 2021-02-20 DIAGNOSIS — F41.9 ANXIETY DISORDER, UNSPECIFIED TYPE: ICD-10-CM

## 2021-02-21 RX ORDER — CITALOPRAM 40 MG/1
TABLET, FILM COATED ORAL
Qty: 90 TAB | Refills: 0 | Status: SHIPPED | OUTPATIENT
Start: 2021-02-21 | End: 2021-05-23

## 2021-02-27 DIAGNOSIS — E78.5 HYPERLIPIDEMIA, UNSPECIFIED HYPERLIPIDEMIA TYPE: ICD-10-CM

## 2021-02-28 RX ORDER — ROSUVASTATIN CALCIUM 10 MG/1
TABLET, COATED ORAL
Qty: 90 TAB | Refills: 1 | Status: SHIPPED | OUTPATIENT
Start: 2021-02-28 | End: 2021-09-03

## 2021-03-27 DIAGNOSIS — F41.9 ANXIETY DISORDER, UNSPECIFIED TYPE: ICD-10-CM

## 2021-03-27 DIAGNOSIS — E78.5 HYPERLIPIDEMIA LDL GOAL <130: Primary | ICD-10-CM

## 2021-03-27 NOTE — LETTER
CONTROLLED SUBSTANCE MEDICATION AGREEMENT  Patient Name: Checo Shay  Patient YOB: 1950     I understand, that controlled substance medications may be used to help better manage my symptoms and to improve my ability to function at home, work and in social settings. However, I also understand that these medications do have risks, which have been discussed with me, including possible development of physical or psychological dependence. I understand that successful treatment requires mutual trust and honesty between me and my provider. I understand and agree that following this Medication Agreement is necessary in continuing my provider-patient relationship and the success of my treatment plan. Explanation from my Provider: Benefits and Goals of Controlled Substance Medications: There are two potential goals for your treatment: (1) decreased pain and suffering (2) improved daily life functions. There are many possible treatments for your chronic condition(s). Alternatives such as physical therapy, yoga, massage, home daily exercise, meditation, relaxation techniques, injections, chiropractic manipulations, surgery, cognitive therapy, hypnosis and many medications that are not habit-forming may be used. Use of controlled substance medications may be helpful, but they are unlikely to resolve all symptoms or restore all function. Explanation from my Provider: Risks of Controlled Substance Medications:   Opioid pain medications: These medications can lead to problems such as addiction/dependence, sedation, lightheadedness/dizziness, memory issues, falls, constipation, nausea, or vomiting. They may also impair the ability to drive or operate machinery. Additionally, these medications may lower testosterone levels, leading to loss of bone strength, stamina and sex drive.   They may cause problems with breathing, sleep apnea and reduced coughing, which is especially dangerous for patients with lung disease. Overdose or dangerous interactions with alcohol and other medications may occur, leading to death. Hyperalgesia may develop, which means patients receiving opioids for the treatment of pain may become more sensitive to certain painful stimuli, and in some cases, experience pain from ordinarily non-painful stimuli. Women between the ages of 14-53 who could become pregnant should carefully weigh the risks and benefits of opioids with their physicians, as these medications increase the risk of pregnancy complications, including miscarriage,  delivery and stillbirth. It is also possible for babies to be born addicted to opioids. Opioid dependence withdrawal symptoms may include; feelings of uneasiness, increased pain, irritability, belly pain, diarrhea, sweats and goose-flesh. Testosterone replacement therapy:  Potential side effects include increased risk of stroke and heart attack, blood clots, increased blood pressure, increased cholesterol, enlarged prostate, sleep apnea, irritability/aggression and other mood disorders, and decreased fertility. Radha Schwarz (1950)             Page 1 of 4    Initials:_______    Benzodiazepines and non-benzodiazepine sleep medications: These medications can lead to problems such as addiction/dependence, sedation, fatigue, lightheadedness, dizziness, incoordination, falls, depression, hallucinations, and impaired judgment, memory and concentration. The ability to drive and operate machinery may also be affected. Abnormal sleep-related behaviors have been reported, including sleepwalking, driving, making telephone calls, eating, or having sex while not fully awake. These medications can suppress breathing and worsen sleep apnea, particularly when combined with alcohol or other sedating medications, potentially leading to death. Dependence withdrawal symptoms may include tremors, anxiety, hallucinations and seizures.    Stimulants:  Common adverse effects include addiction/dependence, increased blood pressure and heart rate, decreased appetite, nausea, involuntary weight loss, insomnia,  irritability, and headaches. These risks may increase when these medications are combined with other stimulants, such as caffeine pills or energy drinks, certain weight loss supplements and oral decongestants. Dependence withdrawal symptoms may include depressed mood, loss of interest, suicidal thoughts, anxiety, fatigue, appetite changes and agitation. I agree and understand that I and my prescriber have the following rights and responsibilities regarding my treatment plan:   1. MY RIGHTS:  To be informed of my treatment and medication plan. To be an active participant in my health and wellbeing. 2. MY RESPONSIBILITY AND UNDERSTANDING FOR USE OF MEDICATIONS   I will take medications at the dose and frequency as directed. For my safety, I will not increase or change how I take my medications without the recommendation of my healthcare provider.  I will actively participate in any program recommended by my provider which may improve function, including social, physical, psychological programs.  I will not take my medications with alcohol or other drugs not prescribed to me. I understand that drinking alcohol with my medications increases the chances of side effects, including reduced breathing rate and could lead to personal injury when operating machinery.  I understand that if I have a history of substance use disorders, including alcohol or other illicit drugs, that I may be at increased risk of addiction to my medications.  I agree to notify my provider immediately if I should become pregnant so that my treatment plan can be adjusted.    I agree and understand that I shall only receive controlled substance medications from the prescriber that signed this agreement unless there is written agreement among other prescribers of controlled substances outlining the responsibility of the medications being prescribed.  I understand that the if the controlled medication is not helping to achieve goals, the dosage may be tapered and no longer prescribed. 3. MY RESPONSIBILITY FOR COMMUNICATION / PRESCRIPTION RENEWALS   I agree that all controlled substance medications that I take will be prescribed only by my provider. If another healthcare provider prescribes me medication in an emergency, I will notify my provider within seventy-two (72) hours. Checo Shay (1950)             Page 2 of 4    Initials:_______  Cesilialatashaestela Seeds I will arrange for refills at the prescribed interval ONLY during regular office hours. I will not ask for refills earlier than agreed, after-hours, on holidays or weekends. Refills may take up to 72 hours for processing and prescriptions to reach the pharmacy.  I will inform my other health care providers that I am taking these medications and of the existence of this [id number]. In the event of an emergency, I will provide the same information to the emergency department prescribers.  I will keep my provider updated on the pharmacy I am using for controlled medication prescription filling. 4. MY RESPONSIBILITY FOR PROTECTING MEDICATIONS   I will protect my prescriptions and medications. I understand that lost or misplaced prescriptions will not be replaced.  I will keep medications only for my own use and will not share them with others. I will keep all medications away from children.  I agree that if my medications are adjusted or discontinued, I will properly dispose of any remaining medications. I understand that I will be required to dispose of any remaining controlled medications as, directed by my prescriber, prior to being provided with any prescriptions for other controlled medications.   Medication drop box locations can be found at: [email or web address]  5. MY RESPONSIBILITY WITH ILLEGAL DRUGS    I will not use illegal or street drugs or another person's prescription medications not prescribed to me.  If there are identified addiction type symptoms, then referral to a program may be provided by my provider and I agree to follow through with this recommendation. 6. MY RESPONSIBILITY FOR COOPERATION WITH INVESTIGATIONS   I understand that my provider will comply with any applicable law and may discuss my use and/or possible misuse/abuse of controlled substances and alcohol, as appropriate, with any health care provider involved in my care, pharmacist, or legal authority.  I authorize my provider and pharmacy to cooperate fully with law enforcement agencies (as permitted by law) in the investigation of any possible misuse, sale, or other diversion of my controlled substances.  I agree to waive any applicable privilege or right of privacy or confidentiality with respect to these authorizations. 7. PROVIDERS RIGHT TO MONITOR FOR SAFETY: PRESCRIPTION MONITORING / DRUG TESTING   I consent to drug/toxicology screening and will submit to a drug screen upon my providers request to assure I am only taking the prescribed drugs for my safety monitoring. I understand that a drug screen is a laboratory test in which a sample of my urine, blood or saliva is checked to see what drugs I have been taking. This may entail an observed urine specimen, which means that a nurse or other health care provider may watch me provide urine, and I will cooperate if I am asked to provide an observed specimen. Courtney Kapoor (1950)             Page 3 of 4    Initials:_______  [email or web address] I understand that my provider will check a copy of my State Prescription Monitoring Program () Report in order to safely prescribe medications.      Pill Counts: I consent to pill counts when requested. I may be asked to bring all my prescribed controlled substance medications, in their original bottles, to all of my scheduled appointments. In addition, my provider may ask me to come to the practice at any time for a random pill count. 8. TERMINATION OF THIS AGREEMENT   For my safety, my prescriber has the right to stop prescribing controlled substance medications and may end this agreement.  Conditions that may result in termination of this agreement:  a. I do not show any improvement in pain, or my activity has not improved. b. I develop rapid tolerance or loss of improvement, as described in my treatment plan.  c. I develop significant side effects from the medication. d. My behavior is not consistent with the responsibilities outlined above, thereby causing safety concerns to continue prescribing controlled substance medications. e. I fail to follow the terms of this agreement. f. Other:____________________________     UNDERSTANDING THIS MEDICATION AGREEMENT:    I have read the above and have had all my questions answered. For chronic disease management, I know that my symptoms can be managed with many types of treatments. A chronic medication trial may be part of my treatment, but I must be an active participant in my care. Medication therapy is only one part of my symptom management plan. In some cases, there may be limited scientific evidence to support the chronic use of certain medications to improve symptoms and daily function. Furthermore, in certain circumstances, there may be scientific information that suggests that the use of chronic controlled substances may worsen my symptoms and increase my risk of unintentional death directly related to this medication therapy. I know that if my provider feels my risk from controlled medications is greater than my benefit, I will have my controlled substance medication(s) compassionately lowered or removed altogether. I further agree to allow this office to contact my HIPAA contact if there are concerns about my safety and use of the controlled medications. I have agreed to use the prescribed controlled substance medications to me as instructed by my provider and as stated in this Medication Agreement. My initial on each page and my signature below shows that I have read each page and I have had the opportunity to ask questions with answers provided by my provider.       Patient Name (Printed): _____________________________________    Patient Signature:  ______________________   Date: _____________      Prescriber Name (Printed): ___________________________________    Prescriber Signature: _____________________  Date: _____________     Shivani Meng (1950)             Page 4 of 4

## 2021-03-28 NOTE — TELEPHONE ENCOUNTER
First of all, not due until the 10th; she has appointment in June which is 7 months from last appt- if using alprazolam daily, needs to be seen every 3 months. Also, she is on a statin and no chol or liver function test check for > 1 year. Let's do a VV in next 1-2 weeks and keep June visit for in office exam to check heart, lungs, circulation, etc.  Check with her for when she will be out of alprazolam if it is before the 10th. Thanks!

## 2021-03-30 NOTE — TELEPHONE ENCOUNTER
Pari:  The xanax refills on 04-01 NOT 04-10. I don't have the network ability at home to do a virtual call, however, we could talk by phone. If that doesn't work, then I guess I'll see you at my June appointment to update my labs, prescriptions, etc.     Hope all is well with you and yours.     Last refill 03/01/21, 02/03/21, 01/06/21, 12/10/21 per

## 2021-03-31 RX ORDER — ALPRAZOLAM 0.5 MG/1
TABLET ORAL
Qty: 30 TAB | Refills: 0 | Status: SHIPPED | OUTPATIENT
Start: 2021-03-31 | End: 2021-06-01 | Stop reason: ALTCHOICE

## 2021-03-31 NOTE — TELEPHONE ENCOUNTER
I don't want to \"beat a dead horse\" but your actual refill date should be 4/10 if you were picking up the Rxs on the recommended time frame- the Rx was picked up on 7/18 and 12/10 and now it has rotated to earlier each month so that you are now picking it up on 4/1. Taking into account for some months with 31 days etc, etc you should be picking t up on 4/10 even though your last refill was 3/1. I will fill the Rx but you need to even out the early ( for convenience or leaving town, etc with the next month or so a later . Let's in the long run keep it at 30 days. Due to Board of medicine regulations, she needs to do Urine drug screen in the next 1-2 weeks. ( can do pending lab then also). I don't see a Controlled substance agreement either.

## 2021-05-22 DIAGNOSIS — F41.9 ANXIETY DISORDER, UNSPECIFIED TYPE: ICD-10-CM

## 2021-05-23 RX ORDER — CITALOPRAM 40 MG/1
TABLET, FILM COATED ORAL
Qty: 90 TABLET | Refills: 0 | Status: SHIPPED | OUTPATIENT
Start: 2021-05-23 | End: 2021-08-23

## 2021-05-26 ENCOUNTER — HOSPITAL ENCOUNTER (OUTPATIENT)
Dept: LAB | Age: 71
Discharge: HOME | End: 2021-05-26
Payer: MEDICARE

## 2021-05-26 PROCEDURE — 80053 COMPREHEN METABOLIC PANEL: CPT

## 2021-05-26 PROCEDURE — 80061 LIPID PANEL: CPT

## 2021-06-01 ENCOUNTER — OFFICE VISIT (OUTPATIENT)
Dept: FAMILY MEDICINE CLINIC | Age: 71
End: 2021-06-01
Payer: MEDICARE

## 2021-06-01 VITALS
HEIGHT: 63 IN | RESPIRATION RATE: 16 BRPM | SYSTOLIC BLOOD PRESSURE: 122 MMHG | OXYGEN SATURATION: 99 % | WEIGHT: 136 LBS | DIASTOLIC BLOOD PRESSURE: 70 MMHG | BODY MASS INDEX: 24.1 KG/M2 | HEART RATE: 72 BPM | TEMPERATURE: 98.6 F

## 2021-06-01 DIAGNOSIS — Z23 ENCOUNTER FOR IMMUNIZATION: ICD-10-CM

## 2021-06-01 DIAGNOSIS — K21.9 GERD WITHOUT ESOPHAGITIS: ICD-10-CM

## 2021-06-01 DIAGNOSIS — E55.9 VITAMIN D DEFICIENCY: ICD-10-CM

## 2021-06-01 DIAGNOSIS — Z87.891 FORMER SMOKER: ICD-10-CM

## 2021-06-01 DIAGNOSIS — F41.8 DEPRESSION WITH ANXIETY: ICD-10-CM

## 2021-06-01 DIAGNOSIS — E78.5 HYPERLIPIDEMIA LDL GOAL <130: Primary | ICD-10-CM

## 2021-06-01 DIAGNOSIS — B00.1 FEVER BLISTER: ICD-10-CM

## 2021-06-01 DIAGNOSIS — R31.1 BENIGN MICROSCOPIC HEMATURIA: ICD-10-CM

## 2021-06-01 LAB
ALBUMIN SERPL-MCNC: 4.3 G/DL (ref 3.8–4.8)
ALBUMIN/GLOB SERPL: 1.7 {RATIO} (ref 1.2–2.2)
ALP SERPL-CCNC: 66 IU/L (ref 48–121)
ALT SERPL-CCNC: 9 IU/L (ref 0–32)
AMPHETAMINES UR QL SCN: NEGATIVE NG/ML
AST SERPL-CCNC: 17 IU/L (ref 0–40)
BARBITURATES UR QL SCN: NEGATIVE NG/ML
BENZODIAZ UR QL SCN: NEGATIVE NG/ML
BILIRUB SERPL-MCNC: 0.4 MG/DL (ref 0–1.2)
BUN SERPL-MCNC: 15 MG/DL (ref 8–27)
BUN/CREAT SERPL: 23 (ref 12–28)
BZE UR QL SCN: NEGATIVE NG/ML
CALCIUM SERPL-MCNC: 9.9 MG/DL (ref 8.7–10.3)
CANNABINOIDS UR QL CFM: POSITIVE
CANNABINOIDS UR QL SCN: NORMAL NG/ML
CHLORIDE SERPL-SCNC: 105 MMOL/L (ref 96–106)
CHOLEST SERPL-MCNC: 179 MG/DL (ref 100–199)
CO2 SERPL-SCNC: 24 MMOL/L (ref 20–29)
CREAT SERPL-MCNC: 0.66 MG/DL (ref 0.57–1)
CREAT UR-MCNC: 60.2 MG/DL (ref 20–300)
GLOBULIN SER CALC-MCNC: 2.5 G/DL (ref 1.5–4.5)
GLUCOSE SERPL-MCNC: 99 MG/DL (ref 65–99)
HDLC SERPL-MCNC: 59 MG/DL
IMP & REVIEW OF LAB RESULTS: NORMAL
LDLC SERPL CALC-MCNC: 100 MG/DL (ref 0–99)
METHADONE UR QL SCN: NEGATIVE NG/ML
OPIATES UR QL SCN: NEGATIVE NG/ML
OXYCODONE+OXYMORPHONE UR QL SCN: NEGATIVE NG/ML
PCP UR QL: NEGATIVE NG/ML
PH UR: 5.4 [PH] (ref 4.5–8.9)
PLEASE NOTE:, 733157: NORMAL
POTASSIUM SERPL-SCNC: 5 MMOL/L (ref 3.5–5.2)
PROPOXYPH UR QL SCN: NEGATIVE NG/ML
PROT SERPL-MCNC: 6.8 G/DL (ref 6–8.5)
SODIUM SERPL-SCNC: 143 MMOL/L (ref 134–144)
THC UR CFM-MCNC: >300 NG/ML
TRIGL SERPL-MCNC: 113 MG/DL (ref 0–149)
VLDLC SERPL CALC-MCNC: 20 MG/DL (ref 5–40)

## 2021-06-01 PROCEDURE — G0463 HOSPITAL OUTPT CLINIC VISIT: HCPCS | Performed by: FAMILY MEDICINE

## 2021-06-01 PROCEDURE — G8536 NO DOC ELDER MAL SCRN: HCPCS | Performed by: FAMILY MEDICINE

## 2021-06-01 PROCEDURE — G9717 DOC PT DX DEP/BP F/U NT REQ: HCPCS | Performed by: FAMILY MEDICINE

## 2021-06-01 PROCEDURE — 1090F PRES/ABSN URINE INCON ASSESS: CPT | Performed by: FAMILY MEDICINE

## 2021-06-01 PROCEDURE — 1101F PT FALLS ASSESS-DOCD LE1/YR: CPT | Performed by: FAMILY MEDICINE

## 2021-06-01 PROCEDURE — G8420 CALC BMI NORM PARAMETERS: HCPCS | Performed by: FAMILY MEDICINE

## 2021-06-01 PROCEDURE — 90714 TD VACC NO PRESV 7 YRS+ IM: CPT | Performed by: FAMILY MEDICINE

## 2021-06-01 PROCEDURE — 3017F COLORECTAL CA SCREEN DOC REV: CPT | Performed by: FAMILY MEDICINE

## 2021-06-01 PROCEDURE — G8427 DOCREV CUR MEDS BY ELIG CLIN: HCPCS | Performed by: FAMILY MEDICINE

## 2021-06-01 PROCEDURE — G8399 PT W/DXA RESULTS DOCUMENT: HCPCS | Performed by: FAMILY MEDICINE

## 2021-06-01 PROCEDURE — G0009 ADMIN PNEUMOCOCCAL VACCINE: HCPCS | Performed by: FAMILY MEDICINE

## 2021-06-01 PROCEDURE — 99214 OFFICE O/P EST MOD 30 MIN: CPT | Performed by: FAMILY MEDICINE

## 2021-06-01 RX ORDER — LANSOPRAZOLE 30 MG/1
30 CAPSULE, DELAYED RELEASE ORAL
Qty: 30 CAPSULE | Refills: 3 | Status: SHIPPED | OUTPATIENT
Start: 2021-06-01 | End: 2021-12-08 | Stop reason: ALTCHOICE

## 2021-06-01 RX ORDER — OMEPRAZOLE 20 MG/1
20 CAPSULE, DELAYED RELEASE ORAL DAILY
Qty: 90 CAPSULE | Refills: 3 | Status: SHIPPED | OUTPATIENT
Start: 2021-06-01 | End: 2021-06-01 | Stop reason: ALTCHOICE

## 2021-06-01 NOTE — PROGRESS NOTES
Chief Complaint   Patient presents with    Cholesterol Problem     labs done    Rash     on left shoulder  gets frequent tick bites   1. Have you been to the ER, urgent care clinic since your last visit? Hospitalized since your last visit? No    2. Have you seen or consulted any other health care providers outside of the [address] [place] [address] since your last visit? Include any pap smears or colon screening.  No

## 2021-06-01 NOTE — PROGRESS NOTES
HISTORY OF PRESENT ILLNESS  HPI  Linda Keller a 79 y.o. female with a history of postmenopausal HRT, fever blister, depression with anxiety, leg cramps and hyperlipidemia, who presents to the office today c/o rash and for f/u of these health problems. Pt had a colonoscopy done 10 years ago. She wishes to do a Cologuard test.    She has not had a pneumonia shot. She asks if she can get a Rx for her omeprazole as the OTC version is expensive. Pt has a rash on her left shoulder. Pt [place]. She take calamine lotion for this. She gets tick bites frequently, so she would like to be screened for Lyme disease. Pt notes that she feels like she will throw up if she eats meat. Pt received both doses of the Moderna COVID-19 vaccine. Pt denies unusual SOB, chest pain, and any recent ER visits or hospitalizations. Past Medical History:   Diagnosis Date    Anxiety     Benign microscopic hematuria- most recent w/u neg in 2016 2/14/2017    Depression     Fever blister 4/19/2018    mainly upper lip, chronic    Former smoker 03/11/2010    stopped 2005    Hyperlipidemia 4/14/2014    Leg cramps 3/11/2010    Postmenopausal HRT (hormone replacement therapy) 3/11/2010    stopped 2010     Past Surgical History:   Procedure Laterality Date    HX BREAST BIOPSY Right yrs ago    neg; u/s guided    HX GI      hemmorhoidectomy, fissure    HX OTHER SURGICAL      sq cell L lower leg- 2020     Current Outpatient Medications on File Prior to Visit   Medication Sig Dispense Refill    citalopram (CELEXA) 40 mg tablet Take 1 tablet by mouth once daily 90 Tablet 0    rosuvastatin (CRESTOR) 10 mg tablet Take 1 tablet by mouth nightly 90 Tab 1    valACYclovir (VALTREX) 500 mg tablet Take 1 Tab by mouth daily. 90 Tab 3    naproxen (NAPROSYN) 250 mg tablet Take  by mouth two (2) times daily (with meals).        No current facility-administered medications on file prior to visit. Allergies   Allergen Reactions    Wellbutrin [Bupropion] Unable to Obtain     Family History   Problem Relation Age of Onset    Stroke Mother 80    Cancer Father 52        larynx    Cancer Sister         BRCA    Breast Cancer Sister 48    Diabetes Brother     Diabetes Sister     Psychiatric Disorder Sister         Celia Will Breast Cancer Niece     Heart Attack Maternal Grandmother 72     Social History     Socioeconomic History    Marital status:      Spouse name: Not on file    Number of children: Not on file    Years of education: Not on file    Highest education level: Not on file   Tobacco Use    Smoking status: Former Smoker     Packs/day: 1.00     Years: 30.00     Pack years: 30.00     Types: Cigarettes     Quit date: 2005     Years since quittin.4    Smokeless tobacco: Never Used   Vaping Use    Vaping Use: Never used   Substance and Sexual Activity    Alcohol use: Yes     Alcohol/week: 0.0 standard drinks     Comment: ocassional    Drug use: No    Sexual activity: Yes     Partners: Male     Social Determinants of Health     Financial Resource Strain:     Difficulty of Paying Living Expenses:    Food Insecurity:     Worried About Running Out of Food in the Last Year:     [address] in the Last Year:    Transportation Needs:     Lack of Transportation (Medical):      Lack of Transportation (Non-Medical):    Physical Activity:     Days of Exercise per Week:     Minutes of Exercise per Session:    Stress:     Feeling of Stress :    Social Connections:     Frequency of Communication with Friends and Family:     Frequency of Social Gatherings with Friends and Family:     Attends [employer] Services:     Active Member of Clubs or Organizations:     Attends Club or Organization Meetings:     Marital Status:              Review of Systems   Constitutional: Negative for chills, diaphoresis, fever, malaise/fatigue and weight loss.   Eyes: Negative for blurred vision, double vision, pain and redness. Respiratory: Negative for cough, shortness of breath and wheezing. Cardiovascular: Negative for chest pain, palpitations, orthopnea, claudication, leg swelling and PND. Skin: Negative for itching and rash. Neurological: Negative for dizziness, tingling, tremors, sensory change, speech change, focal weakness, seizures, loss of consciousness, weakness and headaches. Results for orders placed or performed in visit on 03/27/21   LIPID PANEL   Result Value Ref Range    Cholesterol, total 179 100 - 199 mg/dL    Triglyceride 113 0 - 149 mg/dL    HDL Cholesterol 59 >39 mg/dL    VLDL, calculated 20 5 - 40 mg/dL    LDL, calculated 100 (H) 0 - 99 mg/dL   METABOLIC PANEL, COMPREHENSIVE   Result Value Ref Range    Glucose 99 65 - 99 mg/dL    BUN 15 8 - 27 mg/dL    Creatinine 0.66 0.57 - 1.00 mg/dL    GFR est non-AA 90 >59 mL/min/1.73    GFR est  >59 mL/min/1.73    BUN/Creatinine ratio 23 12 - 28    Sodium 143 134 - 144 mmol/L    Potassium 5.0 3.5 - 5.2 mmol/L    Chloride 105 96 - 106 mmol/L    CO2 24 20 - 29 mmol/L    Calcium 9.9 8.7 - 10.3 mg/dL    Protein, total 6.8 6.0 - 8.5 g/dL    Albumin 4.3 3.8 - 4.8 g/dL    GLOBULIN, TOTAL 2.5 1.5 - 4.5 g/dL    A-G Ratio 1.7 1.2 - 2.2    Bilirubin, total 0.4 0.0 - 1.2 mg/dL    Alk. phosphatase 66 48 - 121 IU/L    AST (SGOT) 17 0 - 40 IU/L    ALT (SGPT) 9 0 - 32 IU/L   10-DRUG SCREEN W/CONF   Result Value Ref Range    Amphetamine Screen, urine Negative Zdsfmx=2511 ng/mL    Barbiturates Screen, urine Negative Bhbxgg=711 ng/mL    Benzodiazepines Screen, urine Negative Ldxnbf=423 ng/mL    Cannabinoid Screen, urine See Final Results Cutoff=20 ng/mL    Cocaine Metab.  Screen, urine Negative Tbdgcw=758 ng/mL    Opiate Screen, urine Negative Galidp=871 ng/mL    Oxycodone/Oxymorphone, urine Negative Kexpun=126 ng/mL    Phencyclidine Screen, urine Negative Cutoff=25 ng/mL    Methadone Screen, urine Negative Gjjvvb=621 ng/mL    Propoxyphene Screen, urine Negative Upzhzk=199 ng/mL    Creatinine, urine 60.2 20.0 - 300.0 mg/dL    pH, urine 5.4 4.5 - 8.9    Please Note Comment    CANNABINOID, CONFIRM   Result Value Ref Range    Cannabinoid Positive (A) Cutoff=20    Cannabinoid confirm >300 Cutoff=10 ng/mL   CVD REPORT   Result Value Ref Range    INTERPRETATION Note            Physical Exam  Visit Vitals  /70   Pulse 72   Temp 98.6 °F (37 °C)   Resp 16   Ht 5' 3\" (1.6 m)   Wt 136 lb (61.7 kg)   LMP 01/01/2000   SpO2 99%   BMI 24.09 kg/m²         Head: Normocephalic, without obvious abnormality, atraumatic  Eyes: conjunctivae/corneas clear. PERRL, EOM's intact. Neck: supple, symmetrical, trachea midline, no adenopathy, thyroid: not enlarged, symmetric, no tenderness/mass/nodules, no carotid bruit and no JVD  Lungs: clear to auscultation bilaterally  Heart: regular rate and rhythm, S1, S2 normal, no murmur, click, rub or gallop  Extremities: extremities normal, atraumatic, no cyanosis or edema  Pulses: 2+ and symmetric  Skin: has a few linear erythematous indurated streaks of skin on the left shoulder. No vesicles or pustules (informed that this is poison ivy and pt has lotion at home to put on this). Lymph nodes: Cervical, supraclavicular, and axillary nodes normal.  Neurologic: Grossly normal      ASSESSMENT and PLAN    ICD-10-CM ICD-9-CM    1. Hyperlipidemia LDL goal <130  E78.5 272.4    2. GERD without esophagitis  K21.9 530.81 lansoprazole (PREVACID) 30 mg capsule      DISCONTINUED: omeprazole (PRILOSEC) 20 mg capsule   3. Encounter for immunization  Z23 V03.89 PNEUMOCOCCAL POLYSACCHARIDE VACCINE, 23-VALENT, ADULT OR IMMUNOSUPPRESSED PT DOSE,      ADMIN PNEUMOCOCCAL VACCINE      ADMIN INFLUENZA VIRUS VAC      TETANUS AND DIPHTHERIA TOXOIDS (TD) ADSORBED, PRES. FREE, IN INDIVIDS. >=7, IM   4. Benign microscopic hematuria- most recent w/u neg in 2016  R31.1 599.72    5.  Vitamin D deficiency  E55.9 268.9 6. Fever blister- multiple locations, recurring ~ once a month for years  B00.1 054.9    7. Depression with anxiety  F41.8 300.4    8. Former smoker  Z87.891 V15.82      Diagnoses and all orders for this visit:    1. Hyperlipidemia LDL goal <130    2. GERD without esophagitis  -     lansoprazole (PREVACID) 30 mg capsule; Take 1 Capsule by mouth Daily (before breakfast). Indications: gastroesophageal reflux disease    3. Encounter for immunization  -     PNEUMOCOCCAL POLYSACCHARIDE VACCINE, 23-VALENT, ADULT OR IMMUNOSUPPRESSED PT DOSE,  -     ADMIN PNEUMOCOCCAL VACCINE  -     ADMIN INFLUENZA VIRUS VAC  -     TETANUS AND DIPHTHERIA TOXOIDS (TD) ADSORBED, PRES. FREE, IN INDIVIDS. >=7, IM    4. Benign microscopic hematuria- most recent w/u neg in 2016    5. Vitamin D deficiency    6. Fever blister- multiple locations, recurring ~ once a month for years    7. Depression with anxiety    8. Former smoker      Follow-up and Dispositions    · Return in about 6 months (around 12/1/2021) for F/U cholesterol, anxiety/depression, prn inc leg cramps. lab results and schedule of future lab studies reviewed with patient  reviewed diet, exercise and weight control  cardiovascular risk and specific lipid/LDL goals reviewed  reviewed medications and side effects in detail  Please call my office if there are any questions- [phone]. I will arrange for follow up after the lab tests done from today return  Recommended a weekly \"heart check. \" I went into detail how to do this. Call for refills on medications as needed. Discussed expected course/resolution/complications of diagnosis in detail with patient. Medication risks/benefits/costs/interactions/alternatives discussed with patient. Pt was given an after visit summary which includes diagnoses, current medications & vitals. Pt expressed understanding with the diagnosis and plan      Total 30 minutes  re: Recommended a weekly \"heart check. \" I went into detail how to do this.     Regular exercise is very important to your health; it helps mentally, physically, socially; it prevents injuries if done properly. Exercise, even as simple as walking 20-30 minutes daily has major benefits to your health even though your \"numbers\" are the same in the lab. See if you can add this into your daily regimen and after a few months it will become a regular habit-\"just something you do,\" like brushing your teeth. A combination of aerobic exercise and strengthening and stretching is felt to be the best for you, so this should be your ultimate goal.   This can be done in the privacy of your home or in a group setting as at the gym  Some prefer having a , others prefer to do exercise in groups or individually. Do what \"works\" for you. You need to make it simple and \"fun,\" or you most likely will not continue it. Reviewed symptoms, or lack thereof, of hypertension and elevated cholesterol. Also, discussed symptoms of concern that were noted today in the note above, treatment options( including doing nothing), when to follow up before recommended time frame. Also, answered all questions. I reviewed her lab work that was done recently. She has good LDL level and good kidney and liver function. No signs of DM. We will set her up for Cologuard tests. She is agreeable to doing a colonoscopy next time, probably in 3 years as per recommendations. We updated her tetanus and Pneumovax today. Because omeprazole interacts with citalopram, I switched her Prevacid. She has tried Pepcid before and it does not work. This document was written by Malaika Haney, as dictated by Kimberley Blue MD.  I have reviewed and agree with the above note and have made corrections where appropriate Noé Sheppard M.D.

## 2021-06-28 ENCOUNTER — TELEPHONE (OUTPATIENT)
Dept: FAMILY MEDICINE CLINIC | Age: 71
End: 2021-06-28

## 2021-06-28 DIAGNOSIS — Z12.11 COLON CANCER SCREENING: Primary | ICD-10-CM

## 2021-06-28 NOTE — TELEPHONE ENCOUNTER
----- Message from Handy Montes MD sent at 6/28/2021 10:18 AM EDT -----  Regarding: FW: Visit Follow-Up Question  Contact: [phone]  Nile Rodriguez. Thanks!   ----- Message -----  From: Angie Romero LPN  Sent: 2/69/2555   7:55 AM EDT  To: Handy Montes MD  Subject: FW: Visit Follow-Up Question                       ----- Message -----  From: Radha Bautista  Sent: 6/27/2021  12:16 PM EDT  To: [facility] Nurse [place]  Subject: Visit Jeffrey Shall Dr. Beryle Aas:    I'm looking for a \"poop box\". I do not know the medical name. I'm putting off my colo/rectal exam and was hoping this could fill the gap.

## 2021-07-15 LAB — COLOGUARD TEST, EXTERNAL: NEGATIVE

## 2021-08-23 DIAGNOSIS — F41.9 ANXIETY DISORDER, UNSPECIFIED TYPE: ICD-10-CM

## 2021-08-23 RX ORDER — CITALOPRAM 40 MG/1
TABLET, FILM COATED ORAL
Qty: 90 TABLET | Refills: 1 | Status: SHIPPED | OUTPATIENT
Start: 2021-08-23 | End: 2022-02-23

## 2021-09-02 DIAGNOSIS — E78.5 HYPERLIPIDEMIA, UNSPECIFIED HYPERLIPIDEMIA TYPE: ICD-10-CM

## 2021-09-03 RX ORDER — ROSUVASTATIN CALCIUM 10 MG/1
TABLET, COATED ORAL
Qty: 90 TABLET | Refills: 0 | Status: SHIPPED | OUTPATIENT
Start: 2021-09-03 | End: 2021-11-30

## 2021-11-30 DIAGNOSIS — E78.5 HYPERLIPIDEMIA, UNSPECIFIED HYPERLIPIDEMIA TYPE: ICD-10-CM

## 2021-11-30 RX ORDER — ROSUVASTATIN CALCIUM 10 MG/1
10 TABLET, COATED ORAL
Qty: 90 TABLET | Refills: 0 | Status: SHIPPED | OUTPATIENT
Start: 2021-11-30 | End: 2022-02-27

## 2021-12-07 ENCOUNTER — OFFICE VISIT (OUTPATIENT)
Dept: FAMILY MEDICINE CLINIC | Age: 71
End: 2021-12-07
Payer: MEDICARE

## 2021-12-07 VITALS
DIASTOLIC BLOOD PRESSURE: 74 MMHG | SYSTOLIC BLOOD PRESSURE: 134 MMHG | HEART RATE: 64 BPM | BODY MASS INDEX: 23.04 KG/M2 | TEMPERATURE: 98.3 F | RESPIRATION RATE: 16 BRPM | WEIGHT: 130 LBS | OXYGEN SATURATION: 98 % | HEIGHT: 63 IN

## 2021-12-07 DIAGNOSIS — R31.1 BENIGN MICROSCOPIC HEMATURIA: ICD-10-CM

## 2021-12-07 DIAGNOSIS — E55.9 VITAMIN D DEFICIENCY: ICD-10-CM

## 2021-12-07 DIAGNOSIS — F41.8 DEPRESSION WITH ANXIETY: ICD-10-CM

## 2021-12-07 DIAGNOSIS — E78.5 HYPERLIPIDEMIA LDL GOAL <130: Primary | ICD-10-CM

## 2021-12-07 DIAGNOSIS — Z71.89 ACP (ADVANCE CARE PLANNING): ICD-10-CM

## 2021-12-07 DIAGNOSIS — B00.1 FEVER BLISTER: ICD-10-CM

## 2021-12-07 DIAGNOSIS — Z00.00 MEDICARE ANNUAL WELLNESS VISIT, SUBSEQUENT: ICD-10-CM

## 2021-12-07 LAB
ALBUMIN SERPL-MCNC: 3.7 G/DL (ref 3.5–5)
ALBUMIN/GLOB SERPL: 1.2 {RATIO} (ref 1.1–2.2)
ALP SERPL-CCNC: 63 U/L (ref 45–117)
ALT SERPL-CCNC: 17 U/L (ref 12–78)
ANION GAP SERPL CALC-SCNC: 4 MMOL/L (ref 5–15)
AST SERPL-CCNC: 13 U/L (ref 15–37)
BILIRUB SERPL-MCNC: 0.3 MG/DL (ref 0.2–1)
BUN SERPL-MCNC: 12 MG/DL (ref 6–20)
BUN/CREAT SERPL: 17 (ref 12–20)
CALCIUM SERPL-MCNC: 9.8 MG/DL (ref 8.5–10.1)
CHLORIDE SERPL-SCNC: 108 MMOL/L (ref 97–108)
CHOLEST SERPL-MCNC: 182 MG/DL
CO2 SERPL-SCNC: 28 MMOL/L (ref 21–32)
CREAT SERPL-MCNC: 0.69 MG/DL (ref 0.55–1.02)
GLOBULIN SER CALC-MCNC: 3.1 G/DL (ref 2–4)
GLUCOSE SERPL-MCNC: 96 MG/DL (ref 65–100)
HDLC SERPL-MCNC: 61 MG/DL
HDLC SERPL: 3 {RATIO} (ref 0–5)
LDLC SERPL CALC-MCNC: 88.8 MG/DL (ref 0–100)
POTASSIUM SERPL-SCNC: 4.6 MMOL/L (ref 3.5–5.1)
PROT SERPL-MCNC: 6.8 G/DL (ref 6.4–8.2)
SODIUM SERPL-SCNC: 140 MMOL/L (ref 136–145)
TRIGL SERPL-MCNC: 161 MG/DL (ref ?–150)
VLDLC SERPL CALC-MCNC: 32.2 MG/DL

## 2021-12-07 PROCEDURE — G8420 CALC BMI NORM PARAMETERS: HCPCS | Performed by: FAMILY MEDICINE

## 2021-12-07 PROCEDURE — 3017F COLORECTAL CA SCREEN DOC REV: CPT | Performed by: FAMILY MEDICINE

## 2021-12-07 PROCEDURE — G8427 DOCREV CUR MEDS BY ELIG CLIN: HCPCS | Performed by: FAMILY MEDICINE

## 2021-12-07 PROCEDURE — G9717 DOC PT DX DEP/BP F/U NT REQ: HCPCS | Performed by: FAMILY MEDICINE

## 2021-12-07 PROCEDURE — G0439 PPPS, SUBSEQ VISIT: HCPCS | Performed by: FAMILY MEDICINE

## 2021-12-07 PROCEDURE — G0463 HOSPITAL OUTPT CLINIC VISIT: HCPCS | Performed by: FAMILY MEDICINE

## 2021-12-07 PROCEDURE — G8536 NO DOC ELDER MAL SCRN: HCPCS | Performed by: FAMILY MEDICINE

## 2021-12-07 PROCEDURE — 99214 OFFICE O/P EST MOD 30 MIN: CPT | Performed by: FAMILY MEDICINE

## 2021-12-07 PROCEDURE — 1090F PRES/ABSN URINE INCON ASSESS: CPT | Performed by: FAMILY MEDICINE

## 2021-12-07 PROCEDURE — 1101F PT FALLS ASSESS-DOCD LE1/YR: CPT | Performed by: FAMILY MEDICINE

## 2021-12-07 PROCEDURE — G8399 PT W/DXA RESULTS DOCUMENT: HCPCS | Performed by: FAMILY MEDICINE

## 2021-12-07 NOTE — PROGRESS NOTES
Chief Complaint   Patient presents with    Cholesterol Problem   1. \"Have you been to the ER, urgent care clinic since your last visit? Hospitalized since your last visit? \" No    2. \"Have you seen or consulted any other health care providers outside of the [address] [place] [address] since your last visit? \" No     3. For patients over 45: Has the patient had a colonoscopy? Yes, HM satisfied with blue hyperlink     If the patient is female:    4. For patients over 40: Has the patient had a mammogram? No    5. For patients over 21: Has the patient had a pap smear?  Yes, HM satisfied with blue hyperlink

## 2021-12-07 NOTE — PROGRESS NOTES
HISTORY OF PRESENT ILLNESS  HPI  Robyn Campos is a 71 y.o. female with a history of postmenopausal HRT, fever blister, depression with anxiety, leg cramps and hyperlipidemia, who presents to the office today for f/u of these health problems    Pt received her Moderna booster. She did feel \"a little feverish\" the day after the shot but that has resolved. She asks for her ears to be checked for wax. Pt reports her ears have felt \"hollowy\" for the past couple of months, but it has improved some. Pt thinks this may be related to allergy as she has an intermittent productive cough. The production is clear. She believes she had some chronic bronchitis years ago. Pt denies unusual SOB, chest pain, and any recent ER visits or hospitalizations. Past Medical History:   Diagnosis Date    Anxiety     Benign microscopic hematuria- most recent w/u neg in 2016 2/14/2017    Depression     Fever blister 4/19/2018    mainly upper lip, chronic    Former smoker 03/11/2010    stopped 2005    Hyperlipidemia 4/14/2014    Leg cramps 3/11/2010    Postmenopausal HRT (hormone replacement therapy) 3/11/2010    stopped 2010     Past Surgical History:   Procedure Laterality Date    HX BREAST BIOPSY Right yrs ago    neg; u/s guided    HX GI      hemmorhoidectomy, fissure    HX OTHER SURGICAL      sq cell L lower leg- 2020     Current Outpatient Medications on File Prior to Visit   Medication Sig Dispense Refill    rosuvastatin (CRESTOR) 10 mg tablet Take 1 Tablet by mouth nightly. 90 Tablet 0    citalopram (CELEXA) 40 mg tablet Take 1 tablet by mouth once daily 90 Tablet 1    valACYclovir (VALTREX) 500 mg tablet Take 1 Tab by mouth daily. 90 Tab 3    lansoprazole (PREVACID) 30 mg capsule Take 1 Capsule by mouth Daily (before breakfast). Indications: gastroesophageal reflux disease 30 Capsule 3    naproxen (NAPROSYN) 250 mg tablet Take  by mouth two (2) times daily (with meals).        No current facility-administered medications on file prior to visit. Allergies   Allergen Reactions    Wellbutrin [Bupropion] Unable to Obtain     Family History   Problem Relation Age of Onset    Stroke Mother 80    Cancer Father 52        larynx    Cancer Sister         BRCA    Breast Cancer Sister 48    Diabetes Brother     Diabetes Sister     Psychiatric Disorder Sister         Lulú Covert    Breast Cancer Niece     Heart Attack Maternal Grandmother 72     Social History     Socioeconomic History    Marital status:    Tobacco Use    Smoking status: Former Smoker     Packs/day: 1.00     Years: 30.00     Pack years: 30.00     Types: Cigarettes     Quit date: 2005     Years since quittin.9    Smokeless tobacco: Never Used   Vaping Use    Vaping Use: Never used   Substance and Sexual Activity    Alcohol use: Yes     Alcohol/week: 0.0 standard drinks     Comment: ocassional    Drug use: No    Sexual activity: Yes     Partners: Male                 Review of Systems   Constitutional: Negative for chills, diaphoresis, fever, malaise/fatigue and weight loss. Eyes: Negative for blurred vision, double vision, pain and redness. Respiratory: Negative for cough, shortness of breath and wheezing. Cardiovascular: Negative for chest pain, palpitations, orthopnea, claudication, leg swelling and PND. Skin: Negative for itching and rash. Neurological: Negative for dizziness, tingling, tremors, sensory change, speech change, focal weakness, seizures, loss of consciousness, weakness and headaches.      Results for orders placed or performed in visit on    METABOLIC PANEL, COMPREHENSIVE   Result Value Ref Range    Sodium 140 136 - 145 mmol/L    Potassium 4.6 3.5 - 5.1 mmol/L    Chloride 108 97 - 108 mmol/L    CO2 28 21 - 32 mmol/L    Anion gap 4 (L) 5 - 15 mmol/L    Glucose 96 65 - 100 mg/dL    BUN 12 6 - 20 MG/DL    Creatinine 0.69 0.55 - 1.02 MG/DL    BUN/Creatinine ratio 17 12 - 20 GFR est AA >60 >60 ml/min/1.73m2    GFR est non-AA >60 >60 ml/min/1.73m2    Calcium 9.8 8.5 - 10.1 MG/DL    Bilirubin, total 0.3 0.2 - 1.0 MG/DL    ALT (SGPT) 17 12 - 78 U/L    AST (SGOT) 13 (L) 15 - 37 U/L    Alk. phosphatase 63 45 - 117 U/L    Protein, total 6.8 6.4 - 8.2 g/dL    Albumin 3.7 3.5 - 5.0 g/dL    Globulin 3.1 2.0 - 4.0 g/dL    A-G Ratio 1.2 1.1 - 2.2     LIPID PANEL   Result Value Ref Range    Cholesterol, total 182 <200 MG/DL    Triglyceride 161 (H) <150 MG/DL    HDL Cholesterol 61 MG/DL    LDL, calculated 88.8 0 - 100 MG/DL    VLDL, calculated 32.2 MG/DL    CHOL/HDL Ratio 3.0 0.0 - 5.0                 Physical Exam  Visit Vitals  /74 (BP 1 Location: Left arm, BP Patient Position: Sitting, BP Cuff Size: Large adult)   Pulse 64   Temp 98.3 °F (36.8 °C)   Resp 16   Ht 5' 3\" (1.6 m)   Wt 130 lb (59 kg)   LMP 01/01/2000   SpO2 98%   BMI 23.03 kg/m²           Head: Normocephalic, without obvious abnormality, atraumatic  Eyes: conjunctivae/corneas clear. PERRL, EOM's intact. Ears: normal TM's and external ear canals AU. She has decreased hearing to high frequency finger rub bilaterally, but has normal hearing to low pitch fingernail clickling  Neck: supple, symmetrical, trachea midline, no adenopathy, thyroid: not enlarged, symmetric, no tenderness/mass/nodules, no carotid bruit and no JVD  Lungs: clear to auscultation bilaterally  Heart: regular rate and rhythm, S1, S2 normal, no murmur, click, rub or gallop  Extremities: extremities normal, atraumatic, no cyanosis or edema  Pulses: 2+ and symmetric  Lymph nodes: Cervical, supraclavicular, and axillary nodes normal.  Neurologic: Grossly normal        ASSESSMENT and PLAN    ICD-10-CM ICD-9-CM    1. Hyperlipidemia LDL goal <130  E78.5 272.4 LIPID PANEL      METABOLIC PANEL, COMPREHENSIVE      METABOLIC PANEL, COMPREHENSIVE      LIPID PANEL   2. Vitamin D deficiency  E55.9 268.9    3.  Fever blister- multiple locations, recurring ~ once a month for years  B00.1 054.9    4. Depression with anxiety  F41.8 300.4    5. Benign microscopic hematuria- most recent w/u neg in 2016  R31.1 599.72    6. ACP (advance care planning)  Z71.89 V65.49    7. Medicare annual wellness visit, subsequent  Z00.00 V70.0      Diagnoses and all orders for this visit:    1. Hyperlipidemia LDL goal <130  -     LIPID PANEL; Future  -     METABOLIC PANEL, COMPREHENSIVE; Future    2. Vitamin D deficiency    3. Fever blister- multiple locations, recurring ~ once a month for years    4. Depression with anxiety    5. Benign microscopic hematuria- most recent w/u neg in 2016    6. ACP (advance care planning)    7. Medicare annual wellness visit, subsequent      Follow-up and Dispositions    · Return in about 6 months (around 6/7/2022) for F/U cholesterol, anxiety/depression. lab results and schedule of future lab studies reviewed with patient  reviewed diet, exercise and weight control  cardiovascular risk and specific lipid/LDL goals reviewed  reviewed medications and side effects in detail  Please call my office if there are any questions- [phone]. I will arrange for follow up after the lab tests done from today return  Recommended a weekly \"heart check. \" I went into detail how to do this. Call for refills on medications as needed. Discussed expected course/resolution/complications of diagnosis in detail with patient. Medication risks/benefits/costs/interactions/alternatives discussed with patient. Pt was given an after visit summary which includes diagnoses, current medications & vitals. Pt expressed understanding with the diagnosis and plan      Total 30 minutes  re:  Reviewed symptoms, or lack thereof, of hypertension and elevated cholesterol. Regular exercise is very important to your health; it helps mentally, physically, socially; it prevents injuries if done properly.   Exercise, even as simple as walking 20-30 minutes daily has major benefits to your health even though your \"numbers\" are the same in the lab. See if you can add this into your daily regimen and after a few months it will become a regular habit-\"just something you do,\" like brushing your teeth. A combination of aerobic exercise and strengthening and stretching is felt to be the best for you, so this should be your ultimate goal.   This can be done in the privacy of your home or in a group setting as at the gym  Some prefer having a , others prefer to do exercise in groups or individually. Do what \"works\" for you. You need to make it simple and \"fun,\" or you most likely will not continue it. Recommended a weekly \"heart check. \" I went into detail how to do this. Also, discussed symptoms of concern that were noted today in the note above, treatment options( including doing nothing), when to follow up before recommended time frame. Also, answered all questions. We will set her up for a bone density as she has not had one for several years. She will have a mammogram as well. 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 If she starts to have concerns with her ability to hear in certain situations, she might want to look into a hearing aid. She does see an eye specialist on a regular basis. This document was written by Sultana Caruso, as dictated by Yanet Fong MD.  I have reviewed and agree with the above note and have made corrections where appropriate Noé Culp M.D. This is the Subsequent Medicare Annual Wellness Exam, performed 12 months or more after the Initial AWV or the last Subsequent AWV    I have reviewed the patient's medical history in detail and updated the computerized patient record. Assessment/Plan   Education and counseling provided:  Are appropriate based on today's review and evaluation    1. Hyperlipidemia LDL goal <130  -     LIPID PANEL; Future  -     METABOLIC PANEL, COMPREHENSIVE; Future  2. Vitamin D deficiency  3.  Fever blister- multiple locations, recurring ~ once a month for years  4. Depression with anxiety  5. Benign microscopic hematuria- most recent w/u neg in 2016  6. ACP (advance care planning)  7. Medicare annual wellness visit, subsequent       Depression Risk Factor Screening     3 most recent PHQ Screens 4/10/2019   Little interest or pleasure in doing things Not at all   Feeling down, depressed, irritable, or hopeless Not at all   Total Score PHQ 2 0       Alcohol Risk Screen    Do you average more than 1 drink per night or more than 7 drinks a week:  No    On any one occasion in the past three months have you have had more than 3 drinks containing alcohol:  No        Functional Ability and Level of Safety    Hearing: Hearing is good. Activities of Daily Living: The home contains: no safety equipment. Patient does total self care      Ambulation: with no difficulty     Fall Risk:  Fall Risk Assessment, last 12 mths 12/7/2021   Able to walk? Yes   Fall in past 12 months? 0   Do you feel unsteady? 0   Are you worried about falling 0   Number of falls in past 12 months -   Fall with injury?  -      Abuse Screen:  Patient is not abused       Cognitive Screening    Has your family/caregiver stated any concerns about your memory: no     Cognitive Screening: Normal - Mini Cog Test    Health Maintenance Due     Health Maintenance Due   Topic Date Due    Shingrix Vaccine Age 49> (1 of 2) Never done    Breast Cancer Screen Mammogram  01/25/2020       Patient Care Team   Patient Care Team:  Simin Miguel MD as PCP - Kwame Degroot MD as PCP - [facility] Empaneled Provider    History     Patient Active Problem List   Diagnosis Code    Former smoker G75.308    Postmenopausal HRT (hormone replacement therapy) Z79.890    Leg cramps R25.2    Depression with anxiety F41.8    Hyperlipidemia LDL goal <130 E78.5    Pre-diabetes R73.03    Benign microscopic hematuria- most recent w/u neg in 2016 R31.1    Fever blister B00.1     Past Medical History:   Diagnosis Date    Anxiety     Benign microscopic hematuria- most recent w/u neg in 2016 2017    Depression     Fever blister 2018    mainly upper lip, chronic    Former smoker 2010    stopped     Hyperlipidemia 2014    Leg cramps 3/11/2010    Postmenopausal HRT (hormone replacement therapy) 3/11/2010    stopped       Past Surgical History:   Procedure Laterality Date    HX BREAST BIOPSY Right yrs ago    neg; u/s guided    HX GI      hemmorhoidectomy, fissure    HX OTHER SURGICAL      sq cell L lower leg-      Current Outpatient Medications   Medication Sig Dispense Refill    rosuvastatin (CRESTOR) 10 mg tablet Take 1 Tablet by mouth nightly. 90 Tablet 0    citalopram (CELEXA) 40 mg tablet Take 1 tablet by mouth once daily 90 Tablet 1    valACYclovir (VALTREX) 500 mg tablet Take 1 Tab by mouth daily. 90 Tab 3    lansoprazole (PREVACID) 30 mg capsule Take 1 Capsule by mouth Daily (before breakfast). Indications: gastroesophageal reflux disease 30 Capsule 3    naproxen (NAPROSYN) 250 mg tablet Take  by mouth two (2) times daily (with meals). Allergies   Allergen Reactions    Wellbutrin [Bupropion] Unable to Obtain       Family History   Problem Relation Age of Onset    Stroke Mother 80    Cancer Father 52        larynx    Cancer Sister         BRCA    Breast Cancer Sister 48    Diabetes Brother     Diabetes Sister     Psychiatric Disorder Sister         John Galvan    Breast Cancer Niece     Heart Attack Maternal Grandmother 72     Social History     Tobacco Use    Smoking status: Former Smoker     Packs/day: 1.00     Years: 30.00     Pack years: 30.00     Types: Cigarettes     Quit date: 2005     Years since quittin.9    Smokeless tobacco: Never Used   Substance Use Topics    Alcohol use:  Yes     Alcohol/week: 0.0 standard drinks     Comment: Shayan Bullock MD

## 2021-12-08 NOTE — PATIENT INSTRUCTIONS

## 2021-12-24 DIAGNOSIS — B00.1 FEVER BLISTER: ICD-10-CM

## 2021-12-26 RX ORDER — VALACYCLOVIR HYDROCHLORIDE 500 MG/1
TABLET, FILM COATED ORAL
Qty: 90 TABLET | Refills: 3 | Status: SHIPPED | OUTPATIENT
Start: 2021-12-26

## 2022-02-23 DIAGNOSIS — F41.9 ANXIETY DISORDER, UNSPECIFIED TYPE: ICD-10-CM

## 2022-02-24 RX ORDER — CITALOPRAM 40 MG/1
TABLET, FILM COATED ORAL
Qty: 90 TABLET | Refills: 3 | Status: SHIPPED | OUTPATIENT
Start: 2022-02-24

## 2022-02-26 DIAGNOSIS — E78.5 HYPERLIPIDEMIA, UNSPECIFIED HYPERLIPIDEMIA TYPE: ICD-10-CM

## 2022-02-27 RX ORDER — ROSUVASTATIN CALCIUM 10 MG/1
10 TABLET, COATED ORAL
Qty: 90 TABLET | Refills: 1 | Status: SHIPPED | OUTPATIENT
Start: 2022-02-27 | End: 2022-08-25

## 2022-03-18 PROBLEM — R31.1 BENIGN MICROSCOPIC HEMATURIA: Status: ACTIVE | Noted: 2017-02-14

## 2022-03-19 PROBLEM — B00.1 FEVER BLISTER: Status: ACTIVE | Noted: 2018-04-19

## 2022-06-15 ENCOUNTER — OFFICE VISIT (OUTPATIENT)
Dept: FAMILY MEDICINE CLINIC | Age: 72
End: 2022-06-15
Payer: MEDICARE

## 2022-06-15 VITALS
SYSTOLIC BLOOD PRESSURE: 130 MMHG | TEMPERATURE: 97.8 F | HEIGHT: 63 IN | OXYGEN SATURATION: 98 % | DIASTOLIC BLOOD PRESSURE: 80 MMHG | RESPIRATION RATE: 16 BRPM | BODY MASS INDEX: 23.04 KG/M2 | HEART RATE: 84 BPM | WEIGHT: 130 LBS

## 2022-06-15 DIAGNOSIS — E78.5 HYPERLIPIDEMIA LDL GOAL <130: Primary | ICD-10-CM

## 2022-06-15 DIAGNOSIS — F41.8 DEPRESSION WITH ANXIETY: ICD-10-CM

## 2022-06-15 DIAGNOSIS — B00.1 FEVER BLISTER: ICD-10-CM

## 2022-06-15 DIAGNOSIS — Z12.31 SCREENING MAMMOGRAM, ENCOUNTER FOR: ICD-10-CM

## 2022-06-15 DIAGNOSIS — R31.1 BENIGN MICROSCOPIC HEMATURIA: ICD-10-CM

## 2022-06-15 DIAGNOSIS — E55.9 VITAMIN D DEFICIENCY: ICD-10-CM

## 2022-06-15 DIAGNOSIS — K21.9 GERD WITHOUT ESOPHAGITIS: ICD-10-CM

## 2022-06-15 LAB
ALBUMIN SERPL-MCNC: 4.1 G/DL (ref 3.5–5)
ALBUMIN/GLOB SERPL: 1.5 {RATIO} (ref 1.1–2.2)
ALP SERPL-CCNC: 68 U/L (ref 45–117)
ALT SERPL-CCNC: 19 U/L (ref 12–78)
ANION GAP SERPL CALC-SCNC: 5 MMOL/L (ref 5–15)
AST SERPL-CCNC: 12 U/L (ref 15–37)
BILIRUB SERPL-MCNC: 0.4 MG/DL (ref 0.2–1)
BUN SERPL-MCNC: 11 MG/DL (ref 6–20)
BUN/CREAT SERPL: 16 (ref 12–20)
CALCIUM SERPL-MCNC: 10 MG/DL (ref 8.5–10.1)
CHLORIDE SERPL-SCNC: 106 MMOL/L (ref 97–108)
CHOLEST SERPL-MCNC: 191 MG/DL
CO2 SERPL-SCNC: 29 MMOL/L (ref 21–32)
CREAT SERPL-MCNC: 0.68 MG/DL (ref 0.55–1.02)
GLOBULIN SER CALC-MCNC: 2.8 G/DL (ref 2–4)
GLUCOSE SERPL-MCNC: 103 MG/DL (ref 65–100)
HDLC SERPL-MCNC: 66 MG/DL
HDLC SERPL: 2.9 {RATIO} (ref 0–5)
LDLC SERPL CALC-MCNC: 95.2 MG/DL (ref 0–100)
POTASSIUM SERPL-SCNC: 4.4 MMOL/L (ref 3.5–5.1)
PROT SERPL-MCNC: 6.9 G/DL (ref 6.4–8.2)
SODIUM SERPL-SCNC: 140 MMOL/L (ref 136–145)
TRIGL SERPL-MCNC: 149 MG/DL (ref ?–150)
VLDLC SERPL CALC-MCNC: 29.8 MG/DL

## 2022-06-15 PROCEDURE — 3017F COLORECTAL CA SCREEN DOC REV: CPT | Performed by: FAMILY MEDICINE

## 2022-06-15 PROCEDURE — 1101F PT FALLS ASSESS-DOCD LE1/YR: CPT | Performed by: FAMILY MEDICINE

## 2022-06-15 PROCEDURE — G9899 SCRN MAM PERF RSLTS DOC: HCPCS | Performed by: FAMILY MEDICINE

## 2022-06-15 PROCEDURE — G8399 PT W/DXA RESULTS DOCUMENT: HCPCS | Performed by: FAMILY MEDICINE

## 2022-06-15 PROCEDURE — 99214 OFFICE O/P EST MOD 30 MIN: CPT | Performed by: FAMILY MEDICINE

## 2022-06-15 PROCEDURE — G0463 HOSPITAL OUTPT CLINIC VISIT: HCPCS | Performed by: FAMILY MEDICINE

## 2022-06-15 PROCEDURE — G8427 DOCREV CUR MEDS BY ELIG CLIN: HCPCS | Performed by: FAMILY MEDICINE

## 2022-06-15 PROCEDURE — G8420 CALC BMI NORM PARAMETERS: HCPCS | Performed by: FAMILY MEDICINE

## 2022-06-15 PROCEDURE — G9717 DOC PT DX DEP/BP F/U NT REQ: HCPCS | Performed by: FAMILY MEDICINE

## 2022-06-15 PROCEDURE — G8536 NO DOC ELDER MAL SCRN: HCPCS | Performed by: FAMILY MEDICINE

## 2022-06-15 PROCEDURE — 1123F ACP DISCUSS/DSCN MKR DOCD: CPT | Performed by: FAMILY MEDICINE

## 2022-06-15 PROCEDURE — 1090F PRES/ABSN URINE INCON ASSESS: CPT | Performed by: FAMILY MEDICINE

## 2022-06-15 NOTE — PROGRESS NOTES
HISTORY OF PRESENT ILLNESS  HPI  Tuyet Campos is a 71 y.o. female with a history of postmenopausal HRT, fever blister, depression with anxiety, leg cramps and hyperlipidemia, who presents to the office today for f/u of these health problems    Pt has been on rosuvastatin for a year and is tolerating that with no side effects. Pt denies unusual SOB, chest pain, and any recent ER visits or hospitalizations. Past Medical History:   Diagnosis Date    Anxiety     Benign microscopic hematuria- most recent w/u neg in 2016 2/14/2017    Depression     Fever blister 4/19/2018    mainly upper lip, chronic    Former smoker 03/11/2010    stopped 2005    Hyperlipidemia 4/14/2014    Leg cramps 3/11/2010    Postmenopausal HRT (hormone replacement therapy) 3/11/2010    stopped 2010     Past Surgical History:   Procedure Laterality Date    HX BREAST BIOPSY Right yrs ago    neg; u/s guided    HX GI      hemmorhoidectomy, fissure    HX OTHER SURGICAL      sq cell L lower leg- 2020     Current Outpatient Medications on File Prior to Visit   Medication Sig Dispense Refill    rosuvastatin (CRESTOR) 10 mg tablet Take 1 tablet by mouth nightly 90 Tablet 1    citalopram (CELEXA) 40 mg tablet Take 1 tablet by mouth once daily 90 Tablet 3    valACYclovir (VALTREX) 500 mg tablet Take 1 tablet by mouth once daily 90 Tablet 3     No current facility-administered medications on file prior to visit.      Allergies   Allergen Reactions    Wellbutrin [Bupropion] Unable to Obtain     Family History   Problem Relation Age of Onset    Stroke Mother 80    Cancer Father 52        larynx    Cancer Sister         BRCA    Breast Cancer Sister 48    Diabetes Brother     Diabetes Sister     Psychiatric Disorder Sister         Anola Ramirez Breast Cancer Niece     Heart Attack Maternal Grandmother 72     Social History     Socioeconomic History    Marital status:    Tobacco Use    Smoking status: Former Smoker Packs/day: 1.00     Years: 30.00     Pack years: 30.00     Types: Cigarettes     Quit date: 2005     Years since quittin.4    Smokeless tobacco: Never Used   Vaping Use    Vaping Use: Never used   Substance and Sexual Activity    Alcohol use: Yes     Alcohol/week: 0.0 standard drinks     Comment: ocassional    Drug use: No    Sexual activity: Yes     Partners: Male                 Review of Systems   Constitutional: Negative for chills, diaphoresis, fever, malaise/fatigue and weight loss. Eyes: Negative for blurred vision, double vision, pain and redness. Respiratory: Negative for cough, shortness of breath and wheezing. Cardiovascular: Negative for chest pain, palpitations, orthopnea, claudication, leg swelling and PND. Skin: Negative for itching and rash. Neurological: Negative for dizziness, tingling, tremors, sensory change, speech change, focal weakness, seizures, loss of consciousness, weakness and headaches. Results for orders placed or performed in visit on    METABOLIC PANEL, COMPREHENSIVE   Result Value Ref Range    Sodium 140 136 - 145 mmol/L    Potassium 4.4 3.5 - 5.1 mmol/L    Chloride 106 97 - 108 mmol/L    CO2 29 21 - 32 mmol/L    Anion gap 5 5 - 15 mmol/L    Glucose 103 (H) 65 - 100 mg/dL    BUN 11 6 - 20 MG/DL    Creatinine 0.68 0.55 - 1.02 MG/DL    BUN/Creatinine ratio 16 12 - 20      GFR est AA >60 >60 ml/min/1.73m2    GFR est non-AA >60 >60 ml/min/1.73m2    Calcium 10.0 8.5 - 10.1 MG/DL    Bilirubin, total 0.4 0.2 - 1.0 MG/DL    ALT (SGPT) 19 12 - 78 U/L    AST (SGOT) 12 (L) 15 - 37 U/L    Alk.  phosphatase 68 45 - 117 U/L    Protein, total 6.9 6.4 - 8.2 g/dL    Albumin 4.1 3.5 - 5.0 g/dL    Globulin 2.8 2.0 - 4.0 g/dL    A-G Ratio 1.5 1.1 - 2.2     LIPID PANEL   Result Value Ref Range    Cholesterol, total 191 <200 MG/DL    Triglyceride 149 <150 MG/DL    HDL Cholesterol 66 MG/DL    LDL, calculated 95.2 0 - 100 MG/DL    VLDL, calculated 29.8 MG/DL    CHOL/HDL Ratio 2.9 0.0 - 5.0               Physical Exam  Visit Vitals  /80 (BP 1 Location: Left arm, BP Patient Position: Sitting, BP Cuff Size: Adult)   Pulse 84   Temp 97.8 °F (36.6 °C) (Temporal)   Resp 16   Ht 5' 3\" (1.6 m)   Wt 130 lb (59 kg)   LMP 01/01/2000   SpO2 98%   BMI 23.03 kg/m²         Head: Normocephalic, without obvious abnormality, atraumatic  Eyes: conjunctivae/corneas clear. PERRL, EOM's intact. Neck: supple, symmetrical, trachea midline, no adenopathy, thyroid: not enlarged, symmetric, no tenderness/mass/nodules, no carotid bruit and no JVD  Lungs: clear to auscultation bilaterally  Heart: regular rate and rhythm, S1, S2 normal, no murmur, click, rub or gallop  Extremities: extremities normal, atraumatic, no cyanosis or edema  Pulses: 2+ and symmetric  Lymph nodes: Cervical, supraclavicular, and axillary nodes normal.  Neurologic: Grossly normal          ASSESSMENT and PLAN    ICD-10-CM ICD-9-CM    1. Hyperlipidemia LDL goal <130  E78.5 272.4 LIPID PANEL      METABOLIC PANEL, COMPREHENSIVE      METABOLIC PANEL, COMPREHENSIVE      LIPID PANEL   2. Screening mammogram, encounter for  Z12.31 V76.12 [facility] MAMMO BI SCREENING INCL CAD   3. Fever blister  B00.1 054.9    4. Benign microscopic hematuria  R31.1 599.72    5. Depression with anxiety  F41.8 300.4    6. Vitamin D deficiency  E55.9 268.9    7. GERD without esophagitis  K21.9 530.81      Diagnoses and all orders for this visit:    1. Hyperlipidemia LDL goal <130  -     LIPID PANEL; Future  -     METABOLIC PANEL, COMPREHENSIVE; Future    2. Screening mammogram, encounter for  -     [facility] MAMMO BI SCREENING INCL CAD; Future    3. Fever blister    4. Benign microscopic hematuria    5. Depression with anxiety    6. Vitamin D deficiency    7. GERD without esophagitis      Follow-up and Dispositions    · Return in about 6 months (around 12/15/2022) for F/U cholesterol, anxiety/depression.          lab results and schedule of future lab studies reviewed with patient  reviewed diet, exercise and weight control  cardiovascular risk and specific lipid/LDL goals reviewed  reviewed medications and side effects in detail  Please call my office if there are any questions- [phone]. I will arrange for follow up after the lab tests done from today return  Recommended a weekly \"heart check. \" I went into detail how to do this. Call for refills on medications as needed. Discussed expected course/resolution/complications of diagnosis in detail with patient. Medication risks/benefits/costs/interactions/alternatives discussed with patient. Pt was given an after visit summary which includes diagnoses, current medications & vitals. Pt expressed understanding with the diagnosis and plan        Total 30 minutes  re: Recommended a weekly \"heart check. \" I went into detail how to do this. Regular exercise is very important to your health; it helps mentally, physically, socially; it prevents injuries if done properly. Exercise, even as simple as walking 20-30 minutes daily has major benefits to your health even though your \"numbers\" are the same in the lab. See if you can add this into your daily regimen and after a few months it will become a regular habit-\"just something you do,\" like brushing your teeth. A combination of aerobic exercise and strengthening and stretching is felt to be the best for you, so this should be your ultimate goal.   This can be done in the privacy of your home or in a group setting as at the gym  Some prefer having a , others prefer to do exercise in groups or individually. Do what \"works\" for you. You need to make it simple and \"fun,\" or you most likely will not continue it. Reviewed symptoms, or lack thereof, of hypertension and elevated cholesterol. Also, discussed symptoms of concern that were noted today in the note above, treatment options( including doing nothing), when to follow up before recommended time frame.  Also, answered all questions. I reassured pt that her exam shows normal circulation of her legs and neck vessels. She should continue to do a weekly heart check to monitor her vessels. I congratulated her on her weight control and 30 lbs weight loss over the past 10 years, and her decision to stop smoking years ago. This document was written by Jaleesa Gilbert, as dictated by Nitesh Boyle MD.   I have reviewed and agree with the above note and have made corrections where appropriate Noé Sanchez M.D.

## 2022-08-24 DIAGNOSIS — E78.5 HYPERLIPIDEMIA, UNSPECIFIED HYPERLIPIDEMIA TYPE: ICD-10-CM

## 2022-08-25 RX ORDER — ROSUVASTATIN CALCIUM 10 MG/1
10 TABLET, COATED ORAL
Qty: 90 TABLET | Refills: 0 | Status: SHIPPED | OUTPATIENT
Start: 2022-08-25

## 2022-08-27 DIAGNOSIS — E78.5 HYPERLIPIDEMIA, UNSPECIFIED HYPERLIPIDEMIA TYPE: ICD-10-CM

## 2022-09-01 RX ORDER — ROSUVASTATIN CALCIUM 10 MG/1
10 TABLET, COATED ORAL
Qty: 90 TABLET | Refills: 0 | OUTPATIENT
Start: 2022-09-01

## 2022-11-01 ENCOUNTER — PATIENT MESSAGE (OUTPATIENT)
Dept: FAMILY MEDICINE CLINIC | Age: 72
End: 2022-11-01

## 2022-11-01 RX ORDER — SULFAMETHOXAZOLE AND TRIMETHOPRIM 800; 160 MG/1; MG/1
1 TABLET ORAL 2 TIMES DAILY
Qty: 20 TABLET | Refills: 0 | Status: SHIPPED | OUTPATIENT
Start: 2022-11-01 | End: 2022-11-11

## 2022-11-01 NOTE — TELEPHONE ENCOUNTER
From: Marva Tovar  To: Pato Galan MD  Sent: 11/1/2022 1:34 PM EDT  Subject: Alvera Buff: please tackle Dr Maximus Singh for an antibiotic.

## 2022-11-22 DIAGNOSIS — E78.5 HYPERLIPIDEMIA, UNSPECIFIED HYPERLIPIDEMIA TYPE: ICD-10-CM

## 2022-11-23 RX ORDER — ROSUVASTATIN CALCIUM 10 MG/1
10 TABLET, COATED ORAL
Qty: 90 TABLET | Refills: 0 | Status: SHIPPED | OUTPATIENT
Start: 2022-11-23

## 2023-01-30 ENCOUNTER — OFFICE VISIT (OUTPATIENT)
Dept: FAMILY MEDICINE CLINIC | Age: 73
End: 2023-01-30
Payer: MEDICARE

## 2023-01-30 VITALS
DIASTOLIC BLOOD PRESSURE: 70 MMHG | HEIGHT: 63 IN | RESPIRATION RATE: 16 BRPM | OXYGEN SATURATION: 99 % | SYSTOLIC BLOOD PRESSURE: 136 MMHG | TEMPERATURE: 98.2 F | WEIGHT: 125 LBS | BODY MASS INDEX: 22.15 KG/M2 | HEART RATE: 78 BPM

## 2023-01-30 DIAGNOSIS — F41.8 DEPRESSION WITH ANXIETY: ICD-10-CM

## 2023-01-30 DIAGNOSIS — Z71.89 ACP (ADVANCE CARE PLANNING): ICD-10-CM

## 2023-01-30 DIAGNOSIS — B00.1 FEVER BLISTER: ICD-10-CM

## 2023-01-30 DIAGNOSIS — R31.1 BENIGN MICROSCOPIC HEMATURIA: ICD-10-CM

## 2023-01-30 DIAGNOSIS — Z00.00 MEDICARE ANNUAL WELLNESS VISIT, SUBSEQUENT: ICD-10-CM

## 2023-01-30 DIAGNOSIS — E55.9 VITAMIN D DEFICIENCY: ICD-10-CM

## 2023-01-30 DIAGNOSIS — E78.5 HYPERLIPIDEMIA LDL GOAL <130: Primary | ICD-10-CM

## 2023-01-30 DIAGNOSIS — Z79.890 POSTMENOPAUSAL HRT (HORMONE REPLACEMENT THERAPY): ICD-10-CM

## 2023-01-30 PROCEDURE — G0463 HOSPITAL OUTPT CLINIC VISIT: HCPCS | Performed by: FAMILY MEDICINE

## 2023-01-30 NOTE — PROGRESS NOTES
HISTORY OF PRESENT ILLNESS  HPI  Adam Corbett is a 67 y.o. female with a history of postmenopausal HRT, fever blister, depression with anxiety, leg cramps and hyperlipidemia, who presents to the office today for f/u of these health problems. Pt is not fasting. Pt reports that she has received the initial series of the COVID vaccine and one booster. Pt did a home stool check 1.5 years ago. Pt denies unusual SOB, chest pain, and any recent ER visits or hospitalizations. Past Medical History:   Diagnosis Date    Anxiety     Benign microscopic hematuria- most recent w/u neg in 2016 2/14/2017    Depression     Fever blister 4/19/2018    mainly upper lip, chronic    Former smoker 03/11/2010    stopped 2005    Hyperlipidemia 4/14/2014    Leg cramps 3/11/2010    Postmenopausal HRT (hormone replacement therapy) 3/11/2010    stopped 2010     Past Surgical History:   Procedure Laterality Date    HX BREAST BIOPSY Right yrs ago    neg; u/s guided    HX GI      hemmorhoidectomy, fissure    HX OTHER SURGICAL      sq cell L lower leg- 2020     Current Outpatient Medications on File Prior to Visit   Medication Sig Dispense Refill    valACYclovir (VALTREX) 500 mg tablet Take 1 tablet by mouth once daily 90 Tablet 0    rosuvastatin (CRESTOR) 10 mg tablet Take 1 tablet by mouth nightly 90 Tablet 0    citalopram (CELEXA) 40 mg tablet Take 1 tablet by mouth once daily 90 Tablet 3     No current facility-administered medications on file prior to visit.      Allergies   Allergen Reactions    Wellbutrin [Bupropion] Unable to Obtain     Family History   Problem Relation Age of Onset    Stroke Mother 80    Cancer Father 52        larynx    Cancer Sister         BRCA    Breast Cancer Sister 48    Diabetes Brother     Diabetes Sister     Psychiatric Disorder Sister         [facility]    Breast Cancer Niece     Heart Attack Maternal Grandmother 72     Social History     Socioeconomic History    Marital status:    Tobacco Use    Smoking status: Former     Packs/day: 1.00     Years: 30.00     Pack years: 30.00     Types: Cigarettes     Quit date: 2005     Years since quittin.1    Smokeless tobacco: Never   Vaping Use    Vaping Use: Never used   Substance and Sexual Activity    Alcohol use: Yes     Alcohol/week: 0.0 standard drinks     Comment: ocassional    Drug use: No    Sexual activity: Yes     Partners: Male     Social Determinants of Health     Financial Resource Strain: Low Risk     Difficulty of Paying Living Expenses: Not hard at all   Food Insecurity: No Food Insecurity    Worried About Running Out of Food in the Last Year: Never true    Ran Out of Food in the Last Year: Never true             Review of Systems   Constitutional:  Negative for chills, diaphoresis, fever, malaise/fatigue and weight loss. Eyes:  Negative for blurred vision, double vision, pain and redness. Respiratory:  Negative for cough, shortness of breath and wheezing. Cardiovascular:  Negative for chest pain, palpitations, orthopnea, claudication, leg swelling and PND. Skin:  Negative for itching and rash. Neurological:  Negative for dizziness, tingling, tremors, sensory change, speech change, focal weakness, seizures, loss of consciousness, weakness and headaches. Results for orders placed or performed in visit on    METABOLIC PANEL, COMPREHENSIVE   Result Value Ref Range    Sodium 138 136 - 145 mmol/L    Potassium 4.1 3.5 - 5.1 mmol/L    Chloride 106 97 - 108 mmol/L    CO2 28 21 - 32 mmol/L    Anion gap 4 (L) 5 - 15 mmol/L    Glucose 101 (H) 65 - 100 mg/dL    BUN 13 6 - 20 MG/DL    Creatinine 0.75 0.55 - 1.02 MG/DL    BUN/Creatinine ratio 17 12 - 20      eGFR >60 >60 ml/min/1.73m2    Calcium 9.6 8.5 - 10.1 MG/DL    Bilirubin, total 0.3 0.2 - 1.0 MG/DL    ALT (SGPT) 16 12 - 78 U/L    AST (SGOT) 14 (L) 15 - 37 U/L    Alk.  phosphatase 64 45 - 117 U/L    Protein, total 7.1 6.4 - 8.2 g/dL    Albumin 3.8 3.5 - 5.0 g/dL    Globulin 3.3 2.0 - 4.0 g/dL    A-G Ratio 1.2 1.1 - 2.2     LIPID PANEL   Result Value Ref Range    Cholesterol, total 185 <200 MG/DL    Triglyceride 147 <150 MG/DL    HDL Cholesterol 67 MG/DL    LDL, calculated 88.6 0 - 100 MG/DL    VLDL, calculated 29.4 MG/DL    CHOL/HDL Ratio 2.8 0.0 - 5.0             Physical Exam  Visit Vitals  /70 (BP 1 Location: Left upper arm, BP Patient Position: Sitting, BP Cuff Size: Large adult)   Pulse 78   Temp 98.2 °F (36.8 °C) (Temporal)   Resp 16   Ht 5' 3\" (1.6 m)   Wt 125 lb (56.7 kg)   LMP 01/01/2000   SpO2 99%   BMI 22.14 kg/m²         Head: Normocephalic, without obvious abnormality, atraumatic  Eyes: conjunctivae/corneas clear. PERRL, EOM's intact. Neck: supple, symmetrical, trachea midline, no adenopathy, thyroid: not enlarged, symmetric, no tenderness/mass/nodules, no carotid bruit and no JVD  Lungs: clear to auscultation bilaterally  Heart: regular rate and rhythm, S1, S2 normal, no murmur, click, rub or gallop  Extremities: extremities normal, atraumatic, no cyanosis or edema  Pulses: 2+ and symmetric  Lymph nodes: Cervical, supraclavicular, and axillary nodes normal.  Neurologic: Grossly normal      ASSESSMENT and PLAN    ICD-10-CM ICD-9-CM    1. Hyperlipidemia LDL goal <130  E78.5 272.4 LIPID PANEL      METABOLIC PANEL, COMPREHENSIVE      METABOLIC PANEL, COMPREHENSIVE      LIPID PANEL      2. Fever blister- multiple locations, recurring ~ once a month for years  B00.1 054.9       3. Vitamin D deficiency  E55.9 268.9       4. ACP (advance care planning)  Z71.89 V65.49       5. Medicare annual wellness visit, subsequent  Z00.00 V70.0       6. Benign microscopic hematuria  R31.1 599.72       7. Depression with anxiety  F41.8 300.4       8. Postmenopausal HRT (hormone replacement therapy)  Z79.890 V07.4         Diagnoses and all orders for this visit:    1. Hyperlipidemia LDL goal <130  -     LIPID PANEL; Future  -     METABOLIC PANEL, COMPREHENSIVE; Future    2.  Fever blister- multiple locations, recurring ~ once a month for years    3. Vitamin D deficiency    4. ACP (advance care planning)    5. Medicare annual wellness visit, subsequent    6. Benign microscopic hematuria    7. Depression with anxiety    8. Postmenopausal HRT (hormone replacement therapy)    Follow-up and Dispositions    Return for F/U cholesterol, anxiety/depression. lab results and schedule of future lab studies reviewed with patient  reviewed diet, exercise and weight control  cardiovascular risk and specific lipid/LDL goals reviewed  reviewed medications and side effects in detail  Please call my office if there are any questions- [phone]. I will arrange for follow up after the lab tests done from today return  Recommended a weekly \"heart check. \" I went into detail how to do this. Call for refills on medications as needed. Discussed expected course/resolution/complications of diagnosis in detail with patient. Medication risks/benefits/costs/interactions/alternatives discussed with patient. Pt was given an after visit summary which includes diagnoses, current medications & vitals. Pt expressed understanding with the diagnosis and plan    Total 30 minutes( in addition to the time doing the Annual Wellness Visit)   re: Reviewed symptoms, or lack thereof, of hypertension and elevated cholesterol. Regular exercise is very important to your health; it helps mentally, physically, socially; it prevents injuries if done properly. Exercise, even as simple as walking 20-30 minutes daily has major benefits to your health even though your \"numbers\" are the same in the lab. See if you can add this into your daily regimen and after a few months it will become a regular habit-\"just something you do,\" like brushing your teeth.      A combination of aerobic exercise and strengthening and stretching is felt to be the best for you, so this should be your ultimate goal.   This can be done in the privacy of your home or in a group setting as at the gym  Some prefer having a , others prefer to do exercise in groups or individually. Do what \"works\" for you. You need to make it simple and \"fun,\" or you most likely will not continue it. Recommended a weekly \"heart check. \" I went into detail how to do this. Also, discussed symptoms of concern that were noted today in the note above, treatment options( including doing nothing), when to follow up before recommended time frame. Also, answered all questions. I encourage pt to continue on rosuvastatin and might want to increase the dose at some point. We talked briefly about choice of pain medicine and suggests always starting with Tylenol and if she does use NSAID's use them sparingly. She should let us know if she starts using daily NSIDs. She is doing a heart check regularly and I encouraged she continue with that. This document was written by Elsi Leo, as dictated by Adele Weeks MD.   I have reviewed and agree with the above note and have made corrections where appropriate Noé Ko M.D. This is the Subsequent Medicare Annual Wellness Exam, performed 12 months or more after the Initial AWV or the last Subsequent AWV    I have reviewed the patient's medical history in detail and updated the computerized patient record. Assessment/Plan   Education and counseling provided:  Are appropriate based on today's review and evaluation    1. Hyperlipidemia LDL goal <130  -     LIPID PANEL; Future  -     METABOLIC PANEL, COMPREHENSIVE; Future  2. Fever blister- multiple locations, recurring ~ once a month for years  3. Vitamin D deficiency  4. ACP (advance care planning)  5. Medicare annual wellness visit, subsequent  6. Benign microscopic hematuria  7. Depression with anxiety  8.  Postmenopausal HRT (hormone replacement therapy)       Depression Risk Factor Screening     3 most recent PHQ Screens 1/30/2023   Little interest or pleasure in doing things Not at all   Feeling down, depressed, irritable, or hopeless Not at all   Total Score PHQ 2 0   Trouble falling or staying asleep, or sleeping too much -   Feeling tired or having little energy -   Poor appetite, weight loss, or overeating -   Feeling bad about yourself - or that you are a failure or have let yourself or your family down -   Trouble concentrating on things such as school, work, reading, or watching TV -   Moving or speaking so slowly that other people could have noticed; or the opposite being so fidgety that others notice -   Thoughts of being better off dead, or hurting yourself in some way -   PHQ 9 Score -   How difficult have these problems made it for you to do your work, take care of your home and get along with others -       Alcohol & Drug Abuse Risk Screen    Do you average more than 1 drink per night or more than 7 drinks a week:  No    On any one occasion in the past three months have you have had more than 3 drinks containing alcohol:  No          Functional Ability and Level of Safety    Hearing: Hearing is good. Activities of Daily Living: The home contains: no safety equipment. Patient does total self care      Ambulation: with no difficulty     Fall Risk:  Fall Risk Assessment, last 12 mths 1/30/2023   Able to walk? Yes   Fall in past 12 months? 0   Do you feel unsteady? 0   Are you worried about falling 0   Number of falls in past 12 months -   Fall with injury? -      Abuse Screen:  Patient is not abused       Cognitive Screening    Has your family/caregiver stated any concerns about your memory: no     Cognitive Screening: Normal - Mini Cog Test  I reviewed advanced directive information and written information given to patient; patient to make follow up appointment with a NN for any questions,to review choices made for health care, agent, and anatomical gifts that are on the advanced directive at home.      Health Maintenance Due     Health Maintenance Due   Topic Date Due    Shingles Vaccine (1 of 2) Never done    Breast Cancer Screen Mammogram  01/25/2020    COVID-19 Vaccine (4 - Booster for [facility] series) 12/20/2021       Patient Care Team   Patient Care Team:  Lari Nyhan, MD as PCP - Wendy Mercado MD as PCP - [facility] Empaneled Provider    History     Patient Active Problem List   Diagnosis Code    Former smoker I62.177    Postmenopausal HRT (hormone replacement therapy) Z79.890    Leg cramps R25.2    Depression with anxiety F41.8    Hyperlipidemia LDL goal <130 E78.5    Benign microscopic hematuria- most recent w/u neg in 2016 R31.1    Fever blister B00.1     Past Medical History:   Diagnosis Date    Anxiety     Benign microscopic hematuria- most recent w/u neg in 2016 2/14/2017    Depression     Fever blister 4/19/2018    mainly upper lip, chronic    Former smoker 03/11/2010    stopped 2005    Hyperlipidemia 4/14/2014    Leg cramps 3/11/2010    Postmenopausal HRT (hormone replacement therapy) 3/11/2010    stopped 2010      Past Surgical History:   Procedure Laterality Date    HX BREAST BIOPSY Right yrs ago    neg; u/s guided    HX GI      hemmorhoidectomy, fissure    HX OTHER SURGICAL      sq cell L lower leg- 2020     Current Outpatient Medications   Medication Sig Dispense Refill    valACYclovir (VALTREX) 500 mg tablet Take 1 tablet by mouth once daily 90 Tablet 0    rosuvastatin (CRESTOR) 10 mg tablet Take 1 tablet by mouth nightly 90 Tablet 0    citalopram (CELEXA) 40 mg tablet Take 1 tablet by mouth once daily 90 Tablet 3    OTHER Potassium 99 mg 1 Box 0     Allergies   Allergen Reactions    Wellbutrin [Bupropion] Unable to Obtain       Family History   Problem Relation Age of Onset    Stroke Mother 80    Cancer Father 52        larynx    Cancer Sister         BRCA    Breast Cancer Sister 48    Diabetes Brother     Diabetes Sister     Psychiatric Disorder Sister         Jennifer Latif    Breast Cancer Niece     Heart Attack Maternal Grandmother 72     Social History     Tobacco Use    Smoking status: Former     Packs/day: 1.00     Years: 30.00     Pack years: 30.00     Types: Cigarettes     Quit date: 2005     Years since quittin.1    Smokeless tobacco: Never   Substance Use Topics    Alcohol use:  Yes     Alcohol/week: 0.0 standard drinks     Comment: Idris Goisn MD

## 2023-01-30 NOTE — PROGRESS NOTES
Chief Complaint   Patient presents with    Cholesterol Problem    1. \"Have you been to the ER, urgent care clinic since your last visit? Hospitalized since your last visit? \" No    2. \"Have you seen or consulted any other health care providers outside of the [address] [place] [address] since your last visit? \" No     3. For patients over 45: Has the patient had a colonoscopy? Yes - no Care Gap present     If the patient is female:    4. For patients over 40: Has the patient had a mammogram? No    5. For patients over 21: Has the patient had a pap smear?  NA - based on age

## 2023-01-31 ENCOUNTER — PATIENT MESSAGE (OUTPATIENT)
Dept: FAMILY MEDICINE CLINIC | Age: 73
End: 2023-01-31

## 2023-01-31 LAB
ALBUMIN SERPL-MCNC: 3.8 G/DL (ref 3.5–5)
ALBUMIN/GLOB SERPL: 1.2 (ref 1.1–2.2)
ALP SERPL-CCNC: 64 U/L (ref 45–117)
ALT SERPL-CCNC: 16 U/L (ref 12–78)
ANION GAP SERPL CALC-SCNC: 4 MMOL/L (ref 5–15)
AST SERPL-CCNC: 14 U/L (ref 15–37)
BILIRUB SERPL-MCNC: 0.3 MG/DL (ref 0.2–1)
BUN SERPL-MCNC: 13 MG/DL (ref 6–20)
BUN/CREAT SERPL: 17 (ref 12–20)
CALCIUM SERPL-MCNC: 9.6 MG/DL (ref 8.5–10.1)
CHLORIDE SERPL-SCNC: 106 MMOL/L (ref 97–108)
CHOLEST SERPL-MCNC: 185 MG/DL
CO2 SERPL-SCNC: 28 MMOL/L (ref 21–32)
CREAT SERPL-MCNC: 0.75 MG/DL (ref 0.55–1.02)
GLOBULIN SER CALC-MCNC: 3.3 G/DL (ref 2–4)
GLUCOSE SERPL-MCNC: 101 MG/DL (ref 65–100)
HDLC SERPL-MCNC: 67 MG/DL
HDLC SERPL: 2.8 (ref 0–5)
LDLC SERPL CALC-MCNC: 88.6 MG/DL (ref 0–100)
POTASSIUM SERPL-SCNC: 4.1 MMOL/L (ref 3.5–5.1)
PROT SERPL-MCNC: 7.1 G/DL (ref 6.4–8.2)
SODIUM SERPL-SCNC: 138 MMOL/L (ref 136–145)
TRIGL SERPL-MCNC: 147 MG/DL (ref ?–150)
VLDLC SERPL CALC-MCNC: 29.4 MG/DL

## 2023-02-01 NOTE — TELEPHONE ENCOUNTER
From: Adam Corbett  To: Candance Soles, MD  Sent: 1/31/2023 7:08 PM EST  Subject: Potassium     I take 99 mlg.

## 2023-02-05 NOTE — PATIENT INSTRUCTIONS
Medicare Wellness Visit, Female     The best way to live healthy is to have a lifestyle where you eat a well-balanced diet, exercise regularly, limit alcohol use, and quit all forms of tobacco/nicotine, if applicable. Regular preventive services are another way to keep healthy. Preventive services (vaccines, screening tests, monitoring & exams) can help personalize your care plan, which helps you manage your own care. Screening tests can find health problems at the earliest stages, when they are easiest to treat. [facility] follows the current, evidence-based guidelines published by the [facility] ([facility]) when recommending preventive services for our patients. Because we follow these guidelines, sometimes recommendations change over time as research supports it. (For example, mammograms used to be recommended annually. Even though Medicare will still pay for an annual mammogram, the newer guidelines recommend a mammogram every two years for women of average risk). Of course, you and your doctor may decide to screen more often for some diseases, based on your risk and your co-morbidities (chronic disease you are already diagnosed with). Preventive services for you include:  - Medicare offers their members a free annual wellness visit, which is time for you and your primary care provider to discuss and plan for your preventive service needs.  Take advantage of this benefit every year!    -Over the age of 72 should receive the recommended pneumonia vaccines.    -All adults should have a flu vaccine yearly.  -All adults should have a tetanus vaccine every 10 years.   -Over the age 48 should receive the shingles vaccines.        -All adults should be screened once for Hepatitis C.  -All adults age 38-68 who are overweight should have a diabetes screening test once every three years.   -Other screening tests and preventive services for persons with diabetes include: an eye exam to screen for diabetic retinopathy, a kidney function test, a foot exam, and stricter control over your cholesterol.   -Cardiovascular screening for adults with routine risk involves an electrocardiogram (ECG) at intervals determined by your doctor.     -Colorectal cancer screenings should be done for adults age 39-70 with no increased risk factors for colorectal cancer. There are a number of acceptable methods of screening for this type of cancer. Each test has its own benefits and drawbacks. Discuss with your doctor what is most appropriate for you during your annual wellness visit. The different tests include: colonoscopy (considered the best screening method), a fecal occult blood test, a fecal DNA test, and sigmoidoscopy.    -Lung cancer screening is recommended annually with a low dose CT scan for adults between age 54 and 68, who have smoked at least 30 pack years (equivalent of 1 pack per day for 30 days), and who is a current smoker or quit less than 15 years ago.    -A bone mass density test is recommended when a woman turns 65 to screen for osteoporosis. This test is only recommended one time, as a screening. Some providers will use this same test as a disease monitoring tool if you already have osteoporosis. -Breast cancer screenings are recommended every other year for women of normal risk, age 54-69.    -Cervical cancer screenings for women over age 72 are only recommended with certain risk factors.      Here is a list of your current Health Maintenance items (your personalized list of preventive services) with a due date:  Health Maintenance Due   Topic Date Due    Shingles Vaccine (1 of 2) Never done    Mammogram  01/25/2020    COVID-19 Vaccine (4 - Booster for [facility] series) 12/20/2021

## 2023-03-01 DIAGNOSIS — B00.1 FEVER BLISTER: ICD-10-CM

## 2023-03-01 RX ORDER — VALACYCLOVIR HYDROCHLORIDE 500 MG/1
TABLET, FILM COATED ORAL
Qty: 90 TABLET | Refills: 3 | Status: SHIPPED | OUTPATIENT
Start: 2023-03-01

## 2023-05-22 ENCOUNTER — TELEPHONE (OUTPATIENT)
Age: 73
End: 2023-05-22

## 2023-05-22 DIAGNOSIS — Z12.39 SCREENING BREAST EXAMINATION: Primary | ICD-10-CM

## 2023-12-01 NOTE — TELEPHONE ENCOUNTER
Pt's scheduled to see  on 3.4.24 @ 3:45 PM that was 's soonest appt. Pt did state that she will need a refill before that time?

## 2023-12-02 RX ORDER — CITALOPRAM 40 MG/1
TABLET ORAL
Qty: 90 TABLET | Refills: 1 | Status: SHIPPED | OUTPATIENT
Start: 2023-12-02

## 2024-03-08 ENCOUNTER — OFFICE VISIT (OUTPATIENT)
Age: 74
End: 2024-03-08
Payer: MEDICARE

## 2024-03-08 VITALS
HEIGHT: 63 IN | TEMPERATURE: 98.4 F | WEIGHT: 130 LBS | RESPIRATION RATE: 16 BRPM | HEART RATE: 78 BPM | BODY MASS INDEX: 23.04 KG/M2 | OXYGEN SATURATION: 99 % | DIASTOLIC BLOOD PRESSURE: 74 MMHG | SYSTOLIC BLOOD PRESSURE: 122 MMHG

## 2024-03-08 DIAGNOSIS — F43.23 ADJUSTMENT DISORDER WITH MIXED ANXIETY AND DEPRESSED MOOD: ICD-10-CM

## 2024-03-08 DIAGNOSIS — B00.1 FEVER BLISTER: ICD-10-CM

## 2024-03-08 DIAGNOSIS — E78.5 HYPERLIPIDEMIA LDL GOAL <130: Primary | ICD-10-CM

## 2024-03-08 DIAGNOSIS — Z91.81 AT HIGH RISK FOR FALLS: ICD-10-CM

## 2024-03-08 DIAGNOSIS — R31.1 BENIGN ESSENTIAL MICROSCOPIC HEMATURIA: ICD-10-CM

## 2024-03-08 DIAGNOSIS — K21.9 GASTRO-ESOPHAGEAL REFLUX DISEASE WITHOUT ESOPHAGITIS: ICD-10-CM

## 2024-03-08 LAB
ALBUMIN SERPL-MCNC: 3.6 G/DL (ref 3.5–5)
ALBUMIN/GLOB SERPL: 1.2 (ref 1.1–2.2)
ALP SERPL-CCNC: 69 U/L (ref 45–117)
ALT SERPL-CCNC: 17 U/L (ref 12–78)
ANION GAP SERPL CALC-SCNC: 5 MMOL/L (ref 5–15)
AST SERPL-CCNC: 8 U/L (ref 15–37)
BILIRUB SERPL-MCNC: 0.3 MG/DL (ref 0.2–1)
BUN SERPL-MCNC: 13 MG/DL (ref 6–20)
BUN/CREAT SERPL: 19 (ref 12–20)
CALCIUM SERPL-MCNC: 9.2 MG/DL (ref 8.5–10.1)
CHLORIDE SERPL-SCNC: 109 MMOL/L (ref 97–108)
CHOLEST SERPL-MCNC: 215 MG/DL
CO2 SERPL-SCNC: 28 MMOL/L (ref 21–32)
CREAT SERPL-MCNC: 0.69 MG/DL (ref 0.55–1.02)
GLOBULIN SER CALC-MCNC: 3 G/DL (ref 2–4)
GLUCOSE SERPL-MCNC: 88 MG/DL (ref 65–100)
HDLC SERPL-MCNC: 73 MG/DL
HDLC SERPL: 2.9 (ref 0–5)
LDLC SERPL CALC-MCNC: 120.8 MG/DL (ref 0–100)
POTASSIUM SERPL-SCNC: 4.5 MMOL/L (ref 3.5–5.1)
PROT SERPL-MCNC: 6.6 G/DL (ref 6.4–8.2)
SODIUM SERPL-SCNC: 142 MMOL/L (ref 136–145)
TRIGL SERPL-MCNC: 106 MG/DL
VLDLC SERPL CALC-MCNC: 21.2 MG/DL

## 2024-03-08 PROCEDURE — 1123F ACP DISCUSS/DSCN MKR DOCD: CPT | Performed by: FAMILY MEDICINE

## 2024-03-08 PROCEDURE — 99214 OFFICE O/P EST MOD 30 MIN: CPT | Performed by: FAMILY MEDICINE

## 2024-03-08 PROCEDURE — 3017F COLORECTAL CA SCREEN DOC REV: CPT | Performed by: FAMILY MEDICINE

## 2024-03-08 PROCEDURE — 1036F TOBACCO NON-USER: CPT | Performed by: FAMILY MEDICINE

## 2024-03-08 PROCEDURE — G8427 DOCREV CUR MEDS BY ELIG CLIN: HCPCS | Performed by: FAMILY MEDICINE

## 2024-03-08 PROCEDURE — G8484 FLU IMMUNIZE NO ADMIN: HCPCS | Performed by: FAMILY MEDICINE

## 2024-03-08 PROCEDURE — G8399 PT W/DXA RESULTS DOCUMENT: HCPCS | Performed by: FAMILY MEDICINE

## 2024-03-08 PROCEDURE — 1090F PRES/ABSN URINE INCON ASSESS: CPT | Performed by: FAMILY MEDICINE

## 2024-03-08 PROCEDURE — G8420 CALC BMI NORM PARAMETERS: HCPCS | Performed by: FAMILY MEDICINE

## 2024-03-08 RX ORDER — CITALOPRAM 40 MG/1
40 TABLET ORAL DAILY
Qty: 90 TABLET | Refills: 3 | Status: SHIPPED | OUTPATIENT
Start: 2024-03-08

## 2024-03-08 RX ORDER — VALACYCLOVIR HYDROCHLORIDE 500 MG/1
500 TABLET, FILM COATED ORAL DAILY
Qty: 90 TABLET | Refills: 3 | Status: SHIPPED | OUTPATIENT
Start: 2024-03-08

## 2024-03-08 ASSESSMENT — ENCOUNTER SYMPTOMS
COUGH: 0
EYE REDNESS: 0
EYE PAIN: 0
WHEEZING: 0
SHORTNESS OF BREATH: 0

## 2024-03-08 ASSESSMENT — PATIENT HEALTH QUESTIONNAIRE - PHQ9
2. FEELING DOWN, DEPRESSED OR HOPELESS: 0
SUM OF ALL RESPONSES TO PHQ QUESTIONS 1-9: 0
SUM OF ALL RESPONSES TO PHQ QUESTIONS 1-9: 0
1. LITTLE INTEREST OR PLEASURE IN DOING THINGS: 0
SUM OF ALL RESPONSES TO PHQ QUESTIONS 1-9: 0
SUM OF ALL RESPONSES TO PHQ9 QUESTIONS 1 & 2: 0
SUM OF ALL RESPONSES TO PHQ QUESTIONS 1-9: 0

## 2024-03-08 NOTE — PROGRESS NOTES
Chief Complaint   Patient presents with    Cholesterol Problem     Not on meds      \"Have you been to the ER, urgent care clinic since your last visit?  Hospitalized since your last visit?\"    Fx arm    “Have you seen or consulted any other health care providers outside of [facility] since your last visit?”    [facility]       Have you had a mammogram?”   NO       
discussed with patient.   Pt was given an after visit summary which includes diagnoses, current medications & vitals.   Pt expressed understanding with the diagnosis and plan    Total 30 minutes  re: Reviewed symptoms, or lack thereof, of hypertension and elevated cholesterol.    Recommended a weekly \"heart check.\" I went into detail how to do this. Regular exercise is very important to your health; it helps mentally, physically, socially; it prevents injuries if done properly.  Exercise, even as simple as walking 20-30 minutes daily has major benefits to your health even though your \"numbers\" are the same in the lab. See if you can add this into your daily regimen and after a few months it will become a regular habit-\"just something you do,\" like brushing your teeth. A combination of aerobic exercise and strengthening and stretching is felt to be the best for you, so this should be your ultimate goal. This can be done in the privacy of your home or in a group setting as at the gym  Some prefer having a , others prefer to do exercise in groups or individually.  Do what \"works\" for you. You need to make it simple and \"fun,\" or you most likely will not continue it.     Also, discussed symptoms of concern that were noted today in the note above, treatment options( including doing nothing), when to follow up before recommended time frame. Also, answered all questions.    Since pt is tolerating the rosuvastatin, we'll keep her on that, and send in a new prescription for that today. Her cholesterol numbers were never significantly elevated, but there is a FHx of vascular disease.     She needs to update her pneumonia shot with the Prevnar 20, and informed pt that the shingles vaccine is now covered by her insurance.     Her fall did not occur due to weakness, dizziness, or being unstable on her feet. It was solely from tripping over her dog, so need to be concerned about recurrent falls.     I, Butch Malagon, am

## 2024-03-12 ENCOUNTER — PATIENT MESSAGE (OUTPATIENT)
Age: 74
End: 2024-03-12

## 2024-03-13 RX ORDER — ROSUVASTATIN CALCIUM 10 MG/1
10 TABLET, COATED ORAL DAILY
Qty: 90 TABLET | Refills: 1 | Status: SHIPPED | OUTPATIENT
Start: 2024-03-13

## 2024-03-13 NOTE — TELEPHONE ENCOUNTER
From: Jami Mitchell  To: Dr. Miguel Ángel Diaz  Sent: 3/12/2024 7:37 PM EDT  Subject: Colestoral med    Please send a request to [facility] to fill

## 2024-08-27 ENCOUNTER — HOSPITAL ENCOUNTER (INPATIENT)
Facility: HOSPITAL | Age: 74
LOS: 2 days | Discharge: HOME | DRG: 871 | End: 2024-08-29
Attending: EMERGENCY MEDICINE | Admitting: HOSPITALIST
Payer: MEDICARE

## 2024-08-27 ENCOUNTER — APPOINTMENT (OUTPATIENT)
Facility: HOSPITAL | Age: 74
DRG: 871 | End: 2024-08-27
Payer: MEDICARE

## 2024-08-27 DIAGNOSIS — I24.9 ACUTE CORONARY SYNDROME (HCC): ICD-10-CM

## 2024-08-27 DIAGNOSIS — R11.2 NAUSEA VOMITING AND DIARRHEA: ICD-10-CM

## 2024-08-27 DIAGNOSIS — R79.89 ELEVATED TROPONIN: ICD-10-CM

## 2024-08-27 DIAGNOSIS — R10.84 GENERALIZED ABDOMINAL PAIN: ICD-10-CM

## 2024-08-27 DIAGNOSIS — A41.9 SEPTICEMIA (HCC): Primary | ICD-10-CM

## 2024-08-27 DIAGNOSIS — R19.7 NAUSEA VOMITING AND DIARRHEA: ICD-10-CM

## 2024-08-27 DIAGNOSIS — I21.4 NSTEMI (NON-ST ELEVATED MYOCARDIAL INFARCTION) (HCC): ICD-10-CM

## 2024-08-27 LAB
ALBUMIN SERPL-MCNC: 4.2 G/DL (ref 3.5–5)
ALBUMIN/GLOB SERPL: 1.2 (ref 1.1–2.2)
ALP SERPL-CCNC: 76 U/L (ref 45–117)
ALT SERPL-CCNC: 20 U/L (ref 12–78)
ANION GAP SERPL CALC-SCNC: 20 MMOL/L (ref 5–15)
APPEARANCE UR: CLEAR
APTT PPP: 25.2 SEC (ref 22.1–31)
AST SERPL-CCNC: 19 U/L (ref 15–37)
BACTERIA URNS QL MICRO: NEGATIVE /HPF
BASOPHILS # BLD: 0.1 K/UL (ref 0–0.1)
BASOPHILS NFR BLD: 0 % (ref 0–1)
BILIRUB SERPL-MCNC: 0.5 MG/DL (ref 0.2–1)
BILIRUB UR QL: NEGATIVE
BUN SERPL-MCNC: 17 MG/DL (ref 6–20)
BUN/CREAT SERPL: 19 (ref 12–20)
CALCIUM SERPL-MCNC: 9.8 MG/DL (ref 8.5–10.1)
CHLORIDE SERPL-SCNC: 100 MMOL/L (ref 97–108)
CO2 SERPL-SCNC: 19 MMOL/L (ref 21–32)
COLOR UR: ABNORMAL
CREAT SERPL-MCNC: 0.91 MG/DL (ref 0.55–1.02)
DIFFERENTIAL METHOD BLD: ABNORMAL
EOSINOPHIL # BLD: 0 K/UL (ref 0–0.4)
EOSINOPHIL NFR BLD: 0 % (ref 0–7)
EPITH CASTS URNS QL MICRO: ABNORMAL /LPF
ERYTHROCYTE [DISTWIDTH] IN BLOOD BY AUTOMATED COUNT: 14.1 % (ref 11.5–14.5)
ERYTHROCYTE [DISTWIDTH] IN BLOOD BY AUTOMATED COUNT: 14.1 % (ref 11.5–14.5)
FLUAV RNA SPEC QL NAA+PROBE: NOT DETECTED
FLUBV RNA SPEC QL NAA+PROBE: NOT DETECTED
GLOBULIN SER CALC-MCNC: 3.5 G/DL (ref 2–4)
GLUCOSE SERPL-MCNC: 178 MG/DL (ref 65–100)
GLUCOSE UR STRIP.AUTO-MCNC: NEGATIVE MG/DL
HCT VFR BLD AUTO: 41.1 % (ref 35–47)
HCT VFR BLD AUTO: 44.1 % (ref 35–47)
HGB BLD-MCNC: 13.7 G/DL (ref 11.5–16)
HGB BLD-MCNC: 14.8 G/DL (ref 11.5–16)
HGB UR QL STRIP: ABNORMAL
IMM GRANULOCYTES # BLD AUTO: 0.1 K/UL (ref 0–0.04)
IMM GRANULOCYTES NFR BLD AUTO: 1 % (ref 0–0.5)
INR PPP: 1.1 (ref 0.9–1.1)
KETONES UR QL STRIP.AUTO: 15 MG/DL
LACTATE SERPL-SCNC: 2.6 MMOL/L (ref 0.4–2)
LACTATE SERPL-SCNC: 3.1 MMOL/L (ref 0.4–2)
LACTATE SERPL-SCNC: 5.6 MMOL/L (ref 0.4–2)
LEUKOCYTE ESTERASE UR QL STRIP.AUTO: NEGATIVE
LIPASE SERPL-CCNC: 37 U/L (ref 13–75)
LYMPHOCYTES # BLD: 3 K/UL (ref 0.8–3.5)
LYMPHOCYTES NFR BLD: 17 % (ref 12–49)
MAGNESIUM SERPL-MCNC: 1.7 MG/DL (ref 1.6–2.4)
MCH RBC QN AUTO: 29.6 PG (ref 26–34)
MCH RBC QN AUTO: 30.1 PG (ref 26–34)
MCHC RBC AUTO-ENTMCNC: 33.3 G/DL (ref 30–36.5)
MCHC RBC AUTO-ENTMCNC: 33.6 G/DL (ref 30–36.5)
MCV RBC AUTO: 88.8 FL (ref 80–99)
MCV RBC AUTO: 89.8 FL (ref 80–99)
MONOCYTES # BLD: 0.5 K/UL (ref 0–1)
MONOCYTES NFR BLD: 3 % (ref 5–13)
NEUTS SEG # BLD: 14.2 K/UL (ref 1.8–8)
NEUTS SEG NFR BLD: 79 % (ref 32–75)
NITRITE UR QL STRIP.AUTO: NEGATIVE
NRBC # BLD: 0 K/UL (ref 0–0.01)
NRBC # BLD: 0 K/UL (ref 0–0.01)
NRBC BLD-RTO: 0 PER 100 WBC
NRBC BLD-RTO: 0 PER 100 WBC
PH UR STRIP: 7 (ref 5–8)
PLATELET # BLD AUTO: 383 K/UL (ref 150–400)
PLATELET # BLD AUTO: 442 K/UL (ref 150–400)
PMV BLD AUTO: 10.2 FL (ref 8.9–12.9)
PMV BLD AUTO: 9.5 FL (ref 8.9–12.9)
POTASSIUM SERPL-SCNC: 3.2 MMOL/L (ref 3.5–5.1)
PROCALCITONIN SERPL-MCNC: <0.05 NG/ML
PROT SERPL-MCNC: 7.7 G/DL (ref 6.4–8.2)
PROT UR STRIP-MCNC: NEGATIVE MG/DL
PROTHROMBIN TIME: 11 SEC (ref 9–11.1)
RBC # BLD AUTO: 4.63 M/UL (ref 3.8–5.2)
RBC # BLD AUTO: 4.91 M/UL (ref 3.8–5.2)
RBC #/AREA URNS HPF: ABNORMAL /HPF (ref 0–5)
SARS-COV-2 RNA RESP QL NAA+PROBE: NOT DETECTED
SODIUM SERPL-SCNC: 139 MMOL/L (ref 136–145)
SOURCE: NORMAL
SP GR UR REFRACTOMETRY: 1.01 (ref 1–1.03)
THERAPEUTIC RANGE: NORMAL SECS (ref 58–77)
TROPONIN I SERPL HS-MCNC: 2087 NG/L (ref 0–51)
TROPONIN I SERPL HS-MCNC: 3528 NG/L (ref 0–51)
TROPONIN I SERPL HS-MCNC: 4322 NG/L (ref 0–51)
TROPONIN I SERPL HS-MCNC: 708 NG/L (ref 0–51)
UFH PPP CHRO-ACNC: <0.1 IU/ML
UROBILINOGEN UR QL STRIP.AUTO: 0.2 EU/DL (ref 0.2–1)
WBC # BLD AUTO: 12.7 K/UL (ref 3.6–11)
WBC # BLD AUTO: 18 K/UL (ref 3.6–11)
WBC URNS QL MICRO: ABNORMAL /HPF (ref 0–4)
YEAST URNS QL MICRO: PRESENT

## 2024-08-27 PROCEDURE — 4A023N7 MEASUREMENT OF CARDIAC SAMPLING AND PRESSURE, LEFT HEART, PERCUTANEOUS APPROACH: ICD-10-PCS | Performed by: STUDENT IN AN ORGANIZED HEALTH CARE EDUCATION/TRAINING PROGRAM

## 2024-08-27 PROCEDURE — 83036 HEMOGLOBIN GLYCOSYLATED A1C: CPT

## 2024-08-27 PROCEDURE — 93005 ELECTROCARDIOGRAM TRACING: CPT | Performed by: EMERGENCY MEDICINE

## 2024-08-27 PROCEDURE — 83690 ASSAY OF LIPASE: CPT

## 2024-08-27 PROCEDURE — 2060000000 HC ICU INTERMEDIATE R&B

## 2024-08-27 PROCEDURE — 85027 COMPLETE CBC AUTOMATED: CPT

## 2024-08-27 PROCEDURE — 83605 ASSAY OF LACTIC ACID: CPT

## 2024-08-27 PROCEDURE — 2580000003 HC RX 258: Performed by: STUDENT IN AN ORGANIZED HEALTH CARE EDUCATION/TRAINING PROGRAM

## 2024-08-27 PROCEDURE — 81001 URINALYSIS AUTO W/SCOPE: CPT

## 2024-08-27 PROCEDURE — 6370000000 HC RX 637 (ALT 250 FOR IP): Performed by: HOSPITALIST

## 2024-08-27 PROCEDURE — 85520 HEPARIN ASSAY: CPT

## 2024-08-27 PROCEDURE — 2580000003 HC RX 258: Performed by: HOSPITALIST

## 2024-08-27 PROCEDURE — B2151ZZ FLUOROSCOPY OF LEFT HEART USING LOW OSMOLAR CONTRAST: ICD-10-PCS | Performed by: STUDENT IN AN ORGANIZED HEALTH CARE EDUCATION/TRAINING PROGRAM

## 2024-08-27 PROCEDURE — 85025 COMPLETE CBC W/AUTO DIFF WBC: CPT

## 2024-08-27 PROCEDURE — 96365 THER/PROPH/DIAG IV INF INIT: CPT

## 2024-08-27 PROCEDURE — 6360000002 HC RX W HCPCS: Performed by: STUDENT IN AN ORGANIZED HEALTH CARE EDUCATION/TRAINING PROGRAM

## 2024-08-27 PROCEDURE — 99285 EMERGENCY DEPT VISIT HI MDM: CPT

## 2024-08-27 PROCEDURE — 87040 BLOOD CULTURE FOR BACTERIA: CPT

## 2024-08-27 PROCEDURE — 36415 COLL VENOUS BLD VENIPUNCTURE: CPT

## 2024-08-27 PROCEDURE — 96375 TX/PRO/DX INJ NEW DRUG ADDON: CPT

## 2024-08-27 PROCEDURE — 84484 ASSAY OF TROPONIN QUANT: CPT

## 2024-08-27 PROCEDURE — 71260 CT THORAX DX C+: CPT

## 2024-08-27 PROCEDURE — 84145 PROCALCITONIN (PCT): CPT

## 2024-08-27 PROCEDURE — 6360000004 HC RX CONTRAST MEDICATION: Performed by: STUDENT IN AN ORGANIZED HEALTH CARE EDUCATION/TRAINING PROGRAM

## 2024-08-27 PROCEDURE — 2580000003 HC RX 258: Performed by: EMERGENCY MEDICINE

## 2024-08-27 PROCEDURE — 6370000000 HC RX 637 (ALT 250 FOR IP): Performed by: STUDENT IN AN ORGANIZED HEALTH CARE EDUCATION/TRAINING PROGRAM

## 2024-08-27 PROCEDURE — 80053 COMPREHEN METABOLIC PANEL: CPT

## 2024-08-27 PROCEDURE — 85730 THROMBOPLASTIN TIME PARTIAL: CPT

## 2024-08-27 PROCEDURE — 6360000002 HC RX W HCPCS: Performed by: HOSPITALIST

## 2024-08-27 PROCEDURE — 96376 TX/PRO/DX INJ SAME DRUG ADON: CPT

## 2024-08-27 PROCEDURE — 2580000003 HC RX 258

## 2024-08-27 PROCEDURE — 93005 ELECTROCARDIOGRAM TRACING: CPT | Performed by: STUDENT IN AN ORGANIZED HEALTH CARE EDUCATION/TRAINING PROGRAM

## 2024-08-27 PROCEDURE — 93005 ELECTROCARDIOGRAM TRACING: CPT | Performed by: HOSPITALIST

## 2024-08-27 PROCEDURE — B2111ZZ FLUOROSCOPY OF MULTIPLE CORONARY ARTERIES USING LOW OSMOLAR CONTRAST: ICD-10-PCS | Performed by: STUDENT IN AN ORGANIZED HEALTH CARE EDUCATION/TRAINING PROGRAM

## 2024-08-27 PROCEDURE — 83735 ASSAY OF MAGNESIUM: CPT

## 2024-08-27 PROCEDURE — 87636 SARSCOV2 & INF A&B AMP PRB: CPT

## 2024-08-27 PROCEDURE — 6360000002 HC RX W HCPCS: Performed by: EMERGENCY MEDICINE

## 2024-08-27 PROCEDURE — 85610 PROTHROMBIN TIME: CPT

## 2024-08-27 RX ORDER — ONDANSETRON 2 MG/ML
4 INJECTION INTRAMUSCULAR; INTRAVENOUS EVERY 6 HOURS PRN
Status: DISCONTINUED | OUTPATIENT
Start: 2024-08-27 | End: 2024-08-29 | Stop reason: HOSPADM

## 2024-08-27 RX ORDER — HEPARIN SODIUM 10000 [USP'U]/100ML
5-30 INJECTION, SOLUTION INTRAVENOUS CONTINUOUS
Status: DISCONTINUED | OUTPATIENT
Start: 2024-08-27 | End: 2024-08-27

## 2024-08-27 RX ORDER — 0.9 % SODIUM CHLORIDE 0.9 %
500 INTRAVENOUS SOLUTION INTRAVENOUS ONCE
Status: DISCONTINUED | OUTPATIENT
Start: 2024-08-27 | End: 2024-08-27

## 2024-08-27 RX ORDER — CITALOPRAM HYDROBROMIDE 20 MG/1
40 TABLET ORAL DAILY
Status: DISCONTINUED | OUTPATIENT
Start: 2024-08-28 | End: 2024-08-29 | Stop reason: HOSPADM

## 2024-08-27 RX ORDER — 0.9 % SODIUM CHLORIDE 0.9 %
750 INTRAVENOUS SOLUTION INTRAVENOUS ONCE
Status: COMPLETED | OUTPATIENT
Start: 2024-08-27 | End: 2024-08-27

## 2024-08-27 RX ORDER — DROPERIDOL 2.5 MG/ML
1.25 INJECTION, SOLUTION INTRAMUSCULAR; INTRAVENOUS ONCE
Status: COMPLETED | OUTPATIENT
Start: 2024-08-27 | End: 2024-08-27

## 2024-08-27 RX ORDER — HEPARIN SODIUM 1000 [USP'U]/ML
60 INJECTION, SOLUTION INTRAVENOUS; SUBCUTANEOUS PRN
Status: DISCONTINUED | OUTPATIENT
Start: 2024-08-27 | End: 2024-08-28

## 2024-08-27 RX ORDER — ONDANSETRON 2 MG/ML
4 INJECTION INTRAMUSCULAR; INTRAVENOUS ONCE
Status: COMPLETED | OUTPATIENT
Start: 2024-08-27 | End: 2024-08-27

## 2024-08-27 RX ORDER — POTASSIUM CHLORIDE 7.45 MG/ML
10 INJECTION INTRAVENOUS
Status: DISCONTINUED | OUTPATIENT
Start: 2024-08-27 | End: 2024-08-27

## 2024-08-27 RX ORDER — POTASSIUM CHLORIDE 7.45 MG/ML
10 INJECTION INTRAVENOUS ONCE
Status: COMPLETED | OUTPATIENT
Start: 2024-08-27 | End: 2024-08-27

## 2024-08-27 RX ORDER — 0.9 % SODIUM CHLORIDE 0.9 %
500 INTRAVENOUS SOLUTION INTRAVENOUS ONCE
Status: COMPLETED | OUTPATIENT
Start: 2024-08-27 | End: 2024-08-28

## 2024-08-27 RX ORDER — PROCHLORPERAZINE EDISYLATE 5 MG/ML
10 INJECTION INTRAMUSCULAR; INTRAVENOUS EVERY 6 HOURS PRN
Status: DISCONTINUED | OUTPATIENT
Start: 2024-08-27 | End: 2024-08-29 | Stop reason: HOSPADM

## 2024-08-27 RX ORDER — 0.9 % SODIUM CHLORIDE 0.9 %
1000 INTRAVENOUS SOLUTION INTRAVENOUS ONCE
Status: COMPLETED | OUTPATIENT
Start: 2024-08-27 | End: 2024-08-27

## 2024-08-27 RX ORDER — HEPARIN SODIUM 1000 [USP'U]/ML
60 INJECTION, SOLUTION INTRAVENOUS; SUBCUTANEOUS ONCE
Status: COMPLETED | OUTPATIENT
Start: 2024-08-27 | End: 2024-08-27

## 2024-08-27 RX ORDER — HEPARIN SODIUM 1000 [USP'U]/ML
60 INJECTION, SOLUTION INTRAVENOUS; SUBCUTANEOUS PRN
Status: DISCONTINUED | OUTPATIENT
Start: 2024-08-27 | End: 2024-08-27

## 2024-08-27 RX ORDER — ATORVASTATIN CALCIUM 20 MG/1
40 TABLET, FILM COATED ORAL NIGHTLY
Status: DISCONTINUED | OUTPATIENT
Start: 2024-08-27 | End: 2024-08-29 | Stop reason: HOSPADM

## 2024-08-27 RX ORDER — HEPARIN SODIUM 1000 [USP'U]/ML
60 INJECTION, SOLUTION INTRAVENOUS; SUBCUTANEOUS ONCE
Status: DISCONTINUED | OUTPATIENT
Start: 2024-08-27 | End: 2024-08-27

## 2024-08-27 RX ORDER — HEPARIN SODIUM 10000 [USP'U]/100ML
5-30 INJECTION, SOLUTION INTRAVENOUS CONTINUOUS
Status: DISCONTINUED | OUTPATIENT
Start: 2024-08-27 | End: 2024-08-28

## 2024-08-27 RX ORDER — HEPARIN SODIUM 1000 [USP'U]/ML
30 INJECTION, SOLUTION INTRAVENOUS; SUBCUTANEOUS PRN
Status: DISCONTINUED | OUTPATIENT
Start: 2024-08-27 | End: 2024-08-28

## 2024-08-27 RX ORDER — ASPIRIN 325 MG
325 TABLET ORAL
Status: COMPLETED | OUTPATIENT
Start: 2024-08-27 | End: 2024-08-27

## 2024-08-27 RX ORDER — DIPHENHYDRAMINE HYDROCHLORIDE 50 MG/ML
25 INJECTION INTRAMUSCULAR; INTRAVENOUS ONCE
OUTPATIENT
Start: 2024-08-27 | End: 2024-08-27

## 2024-08-27 RX ORDER — HEPARIN SODIUM 1000 [USP'U]/ML
30 INJECTION, SOLUTION INTRAVENOUS; SUBCUTANEOUS PRN
Status: DISCONTINUED | OUTPATIENT
Start: 2024-08-27 | End: 2024-08-27

## 2024-08-27 RX ORDER — IOPAMIDOL 755 MG/ML
100 INJECTION, SOLUTION INTRAVASCULAR
Status: COMPLETED | OUTPATIENT
Start: 2024-08-27 | End: 2024-08-27

## 2024-08-27 RX ORDER — ASPIRIN 81 MG/1
81 TABLET, CHEWABLE ORAL DAILY
Status: DISCONTINUED | OUTPATIENT
Start: 2024-08-28 | End: 2024-08-29 | Stop reason: HOSPADM

## 2024-08-27 RX ORDER — SODIUM CHLORIDE AND POTASSIUM CHLORIDE 150; 900 MG/100ML; MG/100ML
INJECTION, SOLUTION INTRAVENOUS CONTINUOUS
Status: DISCONTINUED | OUTPATIENT
Start: 2024-08-27 | End: 2024-08-28

## 2024-08-27 RX ADMIN — PIPERACILLIN AND TAZOBACTAM 3375 MG: 3; .375 INJECTION, POWDER, LYOPHILIZED, FOR SOLUTION INTRAVENOUS at 20:55

## 2024-08-27 RX ADMIN — ONDANSETRON HYDROCHLORIDE 4 MG: 2 SOLUTION INTRAMUSCULAR; INTRAVENOUS at 15:06

## 2024-08-27 RX ADMIN — DROPERIDOL 1.25 MG: 2.5 INJECTION, SOLUTION INTRAMUSCULAR; INTRAVENOUS at 15:56

## 2024-08-27 RX ADMIN — HYDROMORPHONE HYDROCHLORIDE 0.5 MG: 1 INJECTION, SOLUTION INTRAMUSCULAR; INTRAVENOUS; SUBCUTANEOUS at 15:06

## 2024-08-27 RX ADMIN — HEPARIN SODIUM 12 UNITS/KG/HR: 10000 INJECTION, SOLUTION INTRAVENOUS at 20:25

## 2024-08-27 RX ADMIN — HEPARIN SODIUM 3400 UNITS: 1000 INJECTION INTRAVENOUS; SUBCUTANEOUS at 20:22

## 2024-08-27 RX ADMIN — SODIUM CHLORIDE 1000 ML: 9 INJECTION, SOLUTION INTRAVENOUS at 14:31

## 2024-08-27 RX ADMIN — POTASSIUM CHLORIDE AND SODIUM CHLORIDE: 900; 150 INJECTION, SOLUTION INTRAVENOUS at 19:37

## 2024-08-27 RX ADMIN — POTASSIUM CHLORIDE 10 MEQ: 7.46 INJECTION, SOLUTION INTRAVENOUS at 16:04

## 2024-08-27 RX ADMIN — POTASSIUM CHLORIDE 10 MEQ: 7.46 INJECTION, SOLUTION INTRAVENOUS at 17:28

## 2024-08-27 RX ADMIN — SODIUM CHLORIDE 500 ML: 9 INJECTION, SOLUTION INTRAVENOUS at 22:30

## 2024-08-27 RX ADMIN — ONDANSETRON HYDROCHLORIDE 4 MG: 2 SOLUTION INTRAMUSCULAR; INTRAVENOUS at 14:34

## 2024-08-27 RX ADMIN — IOPAMIDOL 100 ML: 755 INJECTION, SOLUTION INTRAVENOUS at 15:31

## 2024-08-27 RX ADMIN — ATORVASTATIN CALCIUM 40 MG: 20 TABLET, FILM COATED ORAL at 19:41

## 2024-08-27 RX ADMIN — ASPIRIN 325 MG: 325 TABLET ORAL at 15:56

## 2024-08-27 RX ADMIN — PIPERACILLIN AND TAZOBACTAM 4500 MG: 4; .5 INJECTION, POWDER, LYOPHILIZED, FOR SOLUTION INTRAVENOUS at 14:50

## 2024-08-27 RX ADMIN — PROCHLORPERAZINE EDISYLATE 10 MG: 5 INJECTION INTRAMUSCULAR; INTRAVENOUS at 19:35

## 2024-08-27 RX ADMIN — SODIUM CHLORIDE 750 ML: 9 INJECTION, SOLUTION INTRAVENOUS at 16:06

## 2024-08-27 ASSESSMENT — PAIN SCALES - GENERAL
PAINLEVEL_OUTOF10: 0
PAINLEVEL_OUTOF10: 0
PAINLEVEL_OUTOF10: 7
PAINLEVEL_OUTOF10: 7
PAINLEVEL_OUTOF10: 0

## 2024-08-27 ASSESSMENT — PAIN DESCRIPTION - PAIN TYPE: TYPE: ACUTE PAIN

## 2024-08-27 ASSESSMENT — PAIN DESCRIPTION - ONSET: ONSET: ON-GOING

## 2024-08-27 ASSESSMENT — PAIN DESCRIPTION - ORIENTATION
ORIENTATION: MID
ORIENTATION: MID

## 2024-08-27 ASSESSMENT — PAIN DESCRIPTION - LOCATION
LOCATION: ABDOMEN
LOCATION: ABDOMEN

## 2024-08-27 ASSESSMENT — PAIN DESCRIPTION - FREQUENCY: FREQUENCY: CONTINUOUS

## 2024-08-27 ASSESSMENT — PAIN DESCRIPTION - DESCRIPTORS: DESCRIPTORS: SHOOTING

## 2024-08-27 NOTE — ED NOTES
ED TO INPATIENT SBAR HANDOFF    Patient Name: Jami Mitchell   Preferred Name: Jami  : 1950  74 y.o.   Family/Caregiver Present: no   Code Status Order: No Order  PO Status: NPO:No  Telemetry Order:   C-SSRS: Risk of Suicide: No Risk  Sitter no     Restraints:     Sepsis Risk Score      Situation  Chief Complaint   Patient presents with    Abdominal Pain     Brief Description of Patient's Condition: NVD, sepsis, elevated troponin  Mental Status: oriented and alert  Arrived from:Home  Imaging:   CT CHEST ABDOMEN PELVIS W CONTRAST Additional Contrast? None   Final Result   No evidence of acute process.      Electronically signed by MARGARITA ARMAS        Abnormal labs:   Abnormal Labs Reviewed   CBC WITH AUTO DIFFERENTIAL - Abnormal; Notable for the following components:       Result Value    WBC 18.0 (*)     Platelets 442 (*)     Neutrophils % 79 (*)     Monocytes % 3 (*)     Immature Granulocytes % 1 (*)     Neutrophils Absolute 14.2 (*)     Immature Granulocytes Absolute 0.1 (*)     All other components within normal limits   COMPREHENSIVE METABOLIC PANEL - Abnormal; Notable for the following components:    Potassium 3.2 (*)     CO2 19 (*)     Anion Gap 20 (*)     Glucose 178 (*)     All other components within normal limits   URINALYSIS WITH MICROSCOPIC - Abnormal; Notable for the following components:    Ketones, Urine 15 (*)     Blood, Urine SMALL (*)     Yeast, UA PRESENT (*)     All other components within normal limits   TROPONIN - Abnormal; Notable for the following components:    Troponin, High Sensitivity 708 (*)     All other components within normal limits   LACTIC ACID - Abnormal; Notable for the following components:    Lactic Acid, Plasma 5.6 (*)     All other components within normal limits       Background  Allergies:   Allergies   Allergen Reactions    Bupropion      Other reaction(s): Unable to Obtain     History:   Past Medical History:   Diagnosis Date    Anxiety     Benign microscopic

## 2024-08-27 NOTE — ED NOTES
Pt up to restroom with 1 person assist. Pt ambulatory with steady gait. Pt denies feeling dizzy or SOB when ambulating.

## 2024-08-27 NOTE — ED PROVIDER NOTES
[facility] EMERGENCY DEPT  EMERGENCY DEPARTMENT ENCOUNTER      Patient Name: Jami Mitchell  MRN: [id number]  Birthdate 1950  Date of Evaluation: 8/27/2024  Physician: Gil Renner MD    CHIEF COMPLAINT     No chief complaint on file.      HISTORY OF PRESENT ILLNESS   (Location/Symptom, Timing/Onset, Context/Setting, Quality, Duration, Modifying Factors, Severity)   Jami Mitchell, 74 y.o., female     74-year-old female with no significant past medical history presents with a chief complaint of abdominal pain, vomiting, diarrhea and fever.  Symptoms have been ongoing for several days.  She denies respiratory symptoms.          Nursing Notes were reviewed.    REVIEW OF SYSTEMS    (Not required)   Review of Systems    Except as noted above the remainder of the review of systems was reviewed and negative.     PAST MEDICAL HISTORY     Past Medical History:   Diagnosis Date    Anxiety     Benign microscopic hematuria 2/14/2017    Depression     Fever blister 4/19/2018    mainly upper lip, chronic    Former smoker 03/11/2010    stopped 2005    Hyperlipidemia 4/14/2014    Leg cramps 3/11/2010    Postmenopausal HRT (hormone replacement therapy) 3/11/2010    stopped 2010       SURGICAL HISTORY       Past Surgical History:   Procedure Laterality Date    ARM SURGERY Left 2024    fx left arm plate and screws    BREAST BIOPSY Right yrs ago    neg; u/s guided    GI      hemmorhoidectomy, fissure    OTHER SURGICAL HISTORY      sq cell L lower leg- 2020       CURRENT MEDICATIONS       Previous Medications    CITALOPRAM (CELEXA) 40 MG TABLET    Take 1 tablet by mouth daily    ROSUVASTATIN (CRESTOR) 10 MG TABLET    Take 1 tablet by mouth daily    VALACYCLOVIR (VALTREX) 500 MG TABLET    Take 1 tablet by mouth daily       ALLERGIES     Bupropion    FAMILY HISTORY       Family History   Problem Relation Age of Onset    Stroke Mother 85    Cancer Father 47        larynx    Cancer Sister         BRCA    Breast Cancer Sister 50    Heart

## 2024-08-27 NOTE — ED TRIAGE NOTES
Pt has been having diarrhea for the past couple of days. Today she broke out into cold sweats and had a fever of 102.5. Pt began vomiting today and c/o abdominal pain.

## 2024-08-27 NOTE — ED NOTES
Notified Dr. Abrams of pt's 2nd elevated troponin prior to transportation to send pt to [place]. Per Dr. Abrams, patient is okay to go to 3rd floor intermediate care floor.

## 2024-08-27 NOTE — ED NOTES
Pt rpt rectal temp 96.8. 1 set of blood cultures obtained per MD Zurdo. Pt medicated for pain and nausea. Ok to apply bear hugger after CT scan per MD.     This RN assisted pt to CT at this time.

## 2024-08-27 NOTE — PLAN OF CARE
Problem: Discharge Planning  Goal: Discharge to home or other facility with appropriate resources  Outcome: Progressing  Flowsheets (Taken 8/27/2024 8027)  Discharge to home or other facility with appropriate resources:   Identify barriers to discharge with patient and caregiver   Arrange for needed discharge resources and transportation as appropriate     Problem: ABCDS Injury Assessment  Goal: Absence of physical injury  Outcome: Progressing

## 2024-08-27 NOTE — ED NOTES
ED SIGN OUT NOTE  Care assumed at [facility] 3:02 PM EDT    Patient was signed out to me by Dr. Renner.     Patient is awaiting CT scan with anticipated admission. History of anxiety, depression, HLD, GERD. Meeting SIRS criteria. Empiric antibiotics started. Here with abdominal pain and nausea/vomiting.     BP (!) 159/86   Pulse 76   Temp 97.7 °F (36.5 °C) (Rectal)   Resp 12   Ht 1.6 m (5' 3\")   Wt 54.4 kg (120 lb)   SpO2 90%   BMI 21.26 kg/m²     Labs Reviewed   CBC WITH AUTO DIFFERENTIAL - Abnormal; Notable for the following components:       Result Value    WBC 18.0 (*)     Platelets 442 (*)     Neutrophils % 79 (*)     Monocytes % 3 (*)     Immature Granulocytes % 1 (*)     Neutrophils Absolute 14.2 (*)     Immature Granulocytes Absolute 0.1 (*)     All other components within normal limits   COMPREHENSIVE METABOLIC PANEL - Abnormal; Notable for the following components:    Potassium 3.2 (*)     CO2 19 (*)     Anion Gap 20 (*)     Glucose 178 (*)     All other components within normal limits   URINALYSIS WITH MICROSCOPIC - Abnormal; Notable for the following components:    Ketones, Urine 15 (*)     Blood, Urine SMALL (*)     Yeast, UA PRESENT (*)     All other components within normal limits   TROPONIN - Abnormal; Notable for the following components:    Troponin, High Sensitivity 708 (*)     All other components within normal limits   LACTIC ACID - Abnormal; Notable for the following components:    Lactic Acid, Plasma 5.6 (*)     All other components within normal limits   COVID-19 & INFLUENZA COMBO   CULTURE, BLOOD 1   LIPASE   MAGNESIUM   LACTIC ACID   PROCALCITONIN   LACTIC ACID   TROPONIN     CT CHEST ABDOMEN PELVIS W CONTRAST Additional Contrast? None   Final Result   No evidence of acute process.      Electronically signed by MARGARITA ARMAS          ED Course as of 08/27/24 2308   Tue Aug 27, 2024   1434 EKG shows sinus rhythm at a rate of 65, normal intervals, normal axis, no STEMI  [RD]   1510 Troponin, High Sensitivity(!!): 708 [LT]   1510 Lactic Acid, Plasma(!!): 5.6 [LT]   1537 Patient complains of persistent nausea and abdominal tenderness to palpation, but reports abdominal pain improved and denies any episodes of chest pain.  [LT]   1618 CT CHEST ABDOMEN PELVIS W CONTRAST Additional Contrast? None  No acute findings [LT]   1629 EKG documented and interpreted by Nicole Sharma MD as: Normal sinus rhythm, HR approximately 82, regular rate, prolonged Qtc 500, normal axis, no ST segment elevation   [LT]      ED Course User Index  [LT] Nicole Sharma MD  [RD] Gil Renner MD       Diagnosis:   1. Septicemia (HCC)    2. Generalized abdominal pain    3. Nausea vomiting and diarrhea    4. Elevated troponin        Disposition:   Decision To Admit 08/27/2024 04:21:41 PM    Plan:   Perfect Serve Consult for Admission  4:27 PM    ED Room Number: WER07/07  Patient Name and age:  Jami Mitchell 74 y.o.  female  Working Diagnosis:   1. Septicemia (HCC)    2. Generalized abdominal pain    3. Nausea vomiting and diarrhea    4. Elevated troponin        COVID-19 Suspicion: No  Sepsis present:  Yes  Reassessment needed: Yes  Code Status:  Full Code  Readmission: No  Isolation Requirements: no  Recommended Level of Care: step down  Department: [facility] - [phone]    Other:  74 year old female who presented for evaluation of chills, fever 102.5F, generalized abdominal pain, nausea and vomiting and diarrhea. Symptoms for 2-3 days now. No chest pain or dyspnea. Hypothermic to 96.5F on arrival, improved with Cheryl Hugger. Hypoxia to 88% started on 2 LPM NC with improvement to 96%. Labs notable for WBC 18, troponin 700, lactic acid 5.6. EKG without signs of ischemia. Covid/flu negative. CT scan without acute findings. UA without signs of infection. Repeat troponin and lactic pending. Possible atypical ACS/NSTEMI presentation vs. Type II ischemia, only CAD risk factors per

## 2024-08-27 NOTE — ED NOTES
Pt back from CT at this time. Pt placed on bedpan for urine sample. Pt then placed on bear hugger per MD Zurdo. Pt states she is still nauseated. MD Edith, at bedside to update pt and family at this time.

## 2024-08-27 NOTE — ED NOTES
TRANSFER - OUT REPORT:    Verbal report given to SATINDER Kennedy, and TYREE on Jami Mitchell  being transferred to [facility] for routine progression of patient care       Report consisted of patient's Situation, Background, Assessment and   Recommendations(SBAR).     Information from the following report(s) Nurse Handoff Report, Index, ED Encounter Summary, ED SBAR, Adult Overview, and MAR was reviewed with the receiving nurse.    [place] Fall Assessment:    Presents to emergency department  because of falls (Syncope, seizure, or loss of consciousness): No  Age > 70: Yes  Altered Mental Status, Intoxication with alcohol or substance confusion (Disorientation, impaired judgment, poor safety awaremess, or inability to follow instructions): No  Impaired Mobility: Ambulates or transfers with assistive devices or assistance; Unable to ambulate or transer.: Yes  Nursing Judgement: Yes          Lines:   Peripheral IV 08/27/24 Left Antecubital (Active)   Site Assessment Clean, dry & intact 08/27/24 1432   Line Status Blood return noted;Flushed 08/27/24 1432   Phlebitis Assessment No symptoms 08/27/24 1432   Infiltration Assessment 0 08/27/24 1432   Dressing Status Clean, dry & intact 08/27/24 1432   Dressing Intervention New 08/27/24 1432        Opportunity for questions and clarification was provided.      Patient transported with:  Monitor

## 2024-08-28 ENCOUNTER — APPOINTMENT (OUTPATIENT)
Facility: HOSPITAL | Age: 74
DRG: 871 | End: 2024-08-28
Attending: HOSPITALIST
Payer: MEDICARE

## 2024-08-28 PROBLEM — D72.829 LEUKOCYTOSIS: Status: ACTIVE | Noted: 2024-08-28

## 2024-08-28 PROBLEM — E86.0 DEHYDRATION: Status: ACTIVE | Noted: 2024-08-28

## 2024-08-28 PROBLEM — E87.20 LACTIC ACID ACIDOSIS: Status: ACTIVE | Noted: 2024-08-28

## 2024-08-28 PROBLEM — R31.1 BENIGN MICROSCOPIC HEMATURIA: Status: RESOLVED | Noted: 2017-02-14 | Resolved: 2024-08-28

## 2024-08-28 PROBLEM — E87.6 HYPOKALEMIA: Status: ACTIVE | Noted: 2024-08-28

## 2024-08-28 PROBLEM — R00.0 SINUS TACHYCARDIA: Status: ACTIVE | Noted: 2024-08-28

## 2024-08-28 PROBLEM — R19.7 NAUSEA VOMITING AND DIARRHEA: Status: ACTIVE | Noted: 2024-08-28

## 2024-08-28 PROBLEM — I21.4 NSTEMI (NON-ST ELEVATED MYOCARDIAL INFARCTION) (HCC): Status: ACTIVE | Noted: 2024-08-28

## 2024-08-28 PROBLEM — R11.2 NAUSEA VOMITING AND DIARRHEA: Status: ACTIVE | Noted: 2024-08-28

## 2024-08-28 PROBLEM — B00.1 FEVER BLISTER: Status: RESOLVED | Noted: 2018-04-19 | Resolved: 2024-08-28

## 2024-08-28 PROBLEM — R50.9 FEVER: Status: ACTIVE | Noted: 2024-08-28

## 2024-08-28 PROBLEM — R10.9 ABDOMINAL PAIN: Status: ACTIVE | Noted: 2024-08-28

## 2024-08-28 LAB
-: NORMAL
ALBUMIN SERPL-MCNC: 3.5 G/DL (ref 3.5–5)
ALBUMIN/GLOB SERPL: 1.1 (ref 1.1–2.2)
ALP SERPL-CCNC: 64 U/L (ref 45–117)
ALT SERPL-CCNC: 20 U/L (ref 12–78)
ANION GAP SERPL CALC-SCNC: 9 MMOL/L (ref 5–15)
AST SERPL-CCNC: 30 U/L (ref 15–37)
B PERT DNA SPEC QL NAA+PROBE: NOT DETECTED
BASOPHILS # BLD: 0 K/UL (ref 0–0.1)
BASOPHILS NFR BLD: 0 % (ref 0–1)
BILIRUB SERPL-MCNC: 0.5 MG/DL (ref 0.2–1)
BORDETELLA PARAPERTUSSIS BY PCR: NOT DETECTED
BUN SERPL-MCNC: 12 MG/DL (ref 6–20)
BUN/CREAT SERPL: 17 (ref 12–20)
C PNEUM DNA SPEC QL NAA+PROBE: NOT DETECTED
CALCIUM SERPL-MCNC: 8.5 MG/DL (ref 8.5–10.1)
CHLORIDE SERPL-SCNC: 107 MMOL/L (ref 97–108)
CO2 SERPL-SCNC: 21 MMOL/L (ref 21–32)
CREAT SERPL-MCNC: 0.69 MG/DL (ref 0.55–1.02)
D DIMER PPP FEU-MCNC: 0.4 MG/L FEU (ref 0–0.65)
DIFFERENTIAL METHOD BLD: ABNORMAL
ECHO AO ARCH DIAM: 2.4 CM
ECHO AO ASC DIAM: 2.7 CM
ECHO AO ASCENDING AORTA INDEX: 1.73 CM/M2
ECHO AO ROOT DIAM: 2.5 CM
ECHO AO ROOT INDEX: 1.6 CM/M2
ECHO AV AREA PEAK VELOCITY: 1.6 CM2
ECHO AV AREA VTI: 1.7 CM2
ECHO AV AREA/BSA PEAK VELOCITY: 1 CM2/M2
ECHO AV AREA/BSA VTI: 1.1 CM2/M2
ECHO AV MEAN GRADIENT: 3 MMHG
ECHO AV MEAN VELOCITY: 0.8 M/S
ECHO AV PEAK GRADIENT: 5 MMHG
ECHO AV PEAK VELOCITY: 1.1 M/S
ECHO AV VELOCITY RATIO: 0.73
ECHO AV VTI: 22.1 CM
ECHO BSA: 1.57 M2
ECHO BSA: 1.57 M2
ECHO LA DIAMETER INDEX: 1.41 CM/M2
ECHO LA DIAMETER: 2.2 CM
ECHO LA TO AORTIC ROOT RATIO: 0.88
ECHO LA VOL A-L A2C: 31 ML (ref 22–52)
ECHO LA VOL A-L A4C: 11 ML (ref 22–52)
ECHO LA VOL BP: 20 ML (ref 22–52)
ECHO LA VOL MOD A2C: 29 ML (ref 22–52)
ECHO LA VOL MOD A4C: 10 ML (ref 22–52)
ECHO LA VOL/BSA BIPLANE: 13 ML/M2 (ref 16–34)
ECHO LA VOLUME AREA LENGTH: 21 ML
ECHO LA VOLUME INDEX A-L A2C: 20 ML/M2 (ref 16–34)
ECHO LA VOLUME INDEX A-L A4C: 7 ML/M2 (ref 16–34)
ECHO LA VOLUME INDEX AREA LENGTH: 13 ML/M2 (ref 16–34)
ECHO LA VOLUME INDEX MOD A2C: 19 ML/M2 (ref 16–34)
ECHO LA VOLUME INDEX MOD A4C: 6 ML/M2 (ref 16–34)
ECHO LV E' LATERAL VELOCITY: 5 CM/S
ECHO LV E' SEPTAL VELOCITY: 5 CM/S
ECHO LV EDV A2C: 33 ML
ECHO LV EDV A4C: 44 ML
ECHO LV EDV BP: 42 ML (ref 56–104)
ECHO LV EDV INDEX A4C: 28 ML/M2
ECHO LV EDV INDEX BP: 27 ML/M2
ECHO LV EDV NDEX A2C: 21 ML/M2
ECHO LV EJECTION FRACTION A2C: 29 %
ECHO LV EJECTION FRACTION A4C: 32 %
ECHO LV EJECTION FRACTION BIPLANE: 34 % (ref 55–100)
ECHO LV ESV A2C: 24 ML
ECHO LV ESV A4C: 30 ML
ECHO LV ESV BP: 27 ML (ref 19–49)
ECHO LV ESV INDEX A2C: 15 ML/M2
ECHO LV ESV INDEX A4C: 19 ML/M2
ECHO LV ESV INDEX BP: 17 ML/M2
ECHO LV FRACTIONAL SHORTENING: 14 % (ref 28–44)
ECHO LV INTERNAL DIMENSION DIASTOLE INDEX: 2.82 CM/M2
ECHO LV INTERNAL DIMENSION DIASTOLIC: 4.4 CM (ref 3.9–5.3)
ECHO LV INTERNAL DIMENSION SYSTOLIC INDEX: 2.44 CM/M2
ECHO LV INTERNAL DIMENSION SYSTOLIC: 3.8 CM
ECHO LV IVSD: 0.8 CM (ref 0.6–0.9)
ECHO LV MASS 2D: 109.4 G (ref 67–162)
ECHO LV MASS INDEX 2D: 70.2 G/M2 (ref 43–95)
ECHO LV POSTERIOR WALL DIASTOLIC: 0.8 CM (ref 0.6–0.9)
ECHO LV RELATIVE WALL THICKNESS RATIO: 0.36
ECHO LVOT AREA: 2.3 CM2
ECHO LVOT AV VTI INDEX: 0.74
ECHO LVOT DIAM: 1.7 CM
ECHO LVOT MEAN GRADIENT: 1 MMHG
ECHO LVOT PEAK GRADIENT: 2 MMHG
ECHO LVOT PEAK VELOCITY: 0.8 M/S
ECHO LVOT STROKE VOLUME INDEX: 23.8 ML/M2
ECHO LVOT SV: 37.2 ML
ECHO LVOT VTI: 16.4 CM
ECHO MV A VELOCITY: 0.94 M/S
ECHO MV E DECELERATION TIME (DT): 156.4 MS
ECHO MV E VELOCITY: 0.8 M/S
ECHO MV E/A RATIO: 0.85
ECHO MV E/E' LATERAL: 16
ECHO MV E/E' RATIO (AVERAGED): 16
ECHO MV E/E' SEPTAL: 16
ECHO MV REGURGITANT PEAK GRADIENT: 108 MMHG
ECHO MV REGURGITANT PEAK VELOCITY: 5.2 M/S
ECHO PULMONARY ARTERY END DIASTOLIC PRESSURE: 8 MMHG
ECHO PV MAX VELOCITY: 0.6 M/S
ECHO PV PEAK GRADIENT: 2 MMHG
ECHO PV REGURGITANT MAX VELOCITY: 1.4 M/S
ECHO RV FREE WALL PEAK S': 18 CM/S
ECHO RV INTERNAL DIMENSION: 3.1 CM
ECHO RV TAPSE: 2 CM (ref 1.7–?)
ECHO RVOT MEAN GRADIENT: 1 MMHG
ECHO RVOT PEAK GRADIENT: 1 MMHG
ECHO RVOT PEAK VELOCITY: 0.5 M/S
ECHO RVOT VTI: 9.8 CM
ECHO TV REGURGITANT MAX VELOCITY: 2.8 M/S
ECHO TV REGURGITANT PEAK GRADIENT: 31 MMHG
EKG ATRIAL RATE: 65 BPM
EKG ATRIAL RATE: 83 BPM
EKG ATRIAL RATE: 86 BPM
EKG DIAGNOSIS: NORMAL
EKG P AXIS: -10 DEGREES
EKG P AXIS: 70 DEGREES
EKG P AXIS: 76 DEGREES
EKG P-R INTERVAL: 114 MS
EKG P-R INTERVAL: 140 MS
EKG P-R INTERVAL: 144 MS
EKG Q-T INTERVAL: 402 MS
EKG Q-T INTERVAL: 428 MS
EKG Q-T INTERVAL: 474 MS
EKG QRS DURATION: 66 MS
EKG QRS DURATION: 66 MS
EKG QRS DURATION: 76 MS
EKG QTC CALCULATION (BAZETT): 481 MS
EKG QTC CALCULATION (BAZETT): 492 MS
EKG QTC CALCULATION (BAZETT): 502 MS
EKG R AXIS: 18 DEGREES
EKG R AXIS: 21 DEGREES
EKG R AXIS: 26 DEGREES
EKG T AXIS: 37 DEGREES
EKG T AXIS: 64 DEGREES
EKG T AXIS: 71 DEGREES
EKG VENTRICULAR RATE: 65 BPM
EKG VENTRICULAR RATE: 83 BPM
EKG VENTRICULAR RATE: 86 BPM
EOSINOPHIL # BLD: 0 K/UL (ref 0–0.4)
EOSINOPHIL NFR BLD: 0 % (ref 0–7)
ERYTHROCYTE [DISTWIDTH] IN BLOOD BY AUTOMATED COUNT: 13.9 % (ref 11.5–14.5)
EST. AVERAGE GLUCOSE BLD GHB EST-MCNC: 120 MG/DL
FLUAV SUBTYP SPEC NAA+PROBE: NOT DETECTED
FLUBV RNA SPEC QL NAA+PROBE: NOT DETECTED
GLOBULIN SER CALC-MCNC: 3.2 G/DL (ref 2–4)
GLUCOSE SERPL-MCNC: 179 MG/DL (ref 65–100)
HADV DNA SPEC QL NAA+PROBE: NOT DETECTED
HAV IGM SER QL: NONREACTIVE
HBA1C MFR BLD: 5.8 % (ref 4–5.6)
HBV CORE IGM SER QL: NONREACTIVE
HBV SURFACE AG SER QL: <0.1 INDEX
HBV SURFACE AG SER QL: NEGATIVE
HCOV 229E RNA SPEC QL NAA+PROBE: NOT DETECTED
HCOV HKU1 RNA SPEC QL NAA+PROBE: NOT DETECTED
HCOV NL63 RNA SPEC QL NAA+PROBE: NOT DETECTED
HCOV OC43 RNA SPEC QL NAA+PROBE: NOT DETECTED
HCT VFR BLD AUTO: 40 % (ref 35–47)
HCV AB SER IA-ACNC: 0.05 INDEX
HCV AB SERPL QL IA: NONREACTIVE
HGB BLD-MCNC: 13.7 G/DL (ref 11.5–16)
HMPV RNA SPEC QL NAA+PROBE: NOT DETECTED
HPIV1 RNA SPEC QL NAA+PROBE: NOT DETECTED
HPIV2 RNA SPEC QL NAA+PROBE: NOT DETECTED
HPIV3 RNA SPEC QL NAA+PROBE: NOT DETECTED
HPIV4 RNA SPEC QL NAA+PROBE: NOT DETECTED
IMM GRANULOCYTES # BLD AUTO: 0.1 K/UL (ref 0–0.04)
IMM GRANULOCYTES NFR BLD AUTO: 0 % (ref 0–0.5)
LACTATE SERPL-SCNC: 1.5 MMOL/L (ref 0.4–2)
LYMPHOCYTES # BLD: 1.7 K/UL (ref 0.8–3.5)
LYMPHOCYTES NFR BLD: 12 % (ref 12–49)
M PNEUMO DNA SPEC QL NAA+PROBE: NOT DETECTED
MCH RBC QN AUTO: 30.4 PG (ref 26–34)
MCHC RBC AUTO-ENTMCNC: 34.3 G/DL (ref 30–36.5)
MCV RBC AUTO: 88.9 FL (ref 80–99)
MONOCYTES # BLD: 0.6 K/UL (ref 0–1)
MONOCYTES NFR BLD: 4 % (ref 5–13)
NEUTS SEG # BLD: 11.5 K/UL (ref 1.8–8)
NEUTS SEG NFR BLD: 84 % (ref 32–75)
NRBC # BLD: 0 K/UL (ref 0–0.01)
NRBC BLD-RTO: 0 PER 100 WBC
PLATELET # BLD AUTO: 395 K/UL (ref 150–400)
PMV BLD AUTO: 9.5 FL (ref 8.9–12.9)
POTASSIUM SERPL-SCNC: 3.4 MMOL/L (ref 3.5–5.1)
PROT SERPL-MCNC: 6.7 G/DL (ref 6.4–8.2)
RBC # BLD AUTO: 4.5 M/UL (ref 3.8–5.2)
RSV RNA SPEC QL NAA+PROBE: NOT DETECTED
RV+EV RNA SPEC QL NAA+PROBE: NOT DETECTED
SARS-COV-2 RNA RESP QL NAA+PROBE: NOT DETECTED
SODIUM SERPL-SCNC: 137 MMOL/L (ref 136–145)
SPECIMEN HOLD: NORMAL
TROPONIN I SERPL HS-MCNC: 4874 NG/L (ref 0–51)
TROPONIN I SERPL HS-MCNC: 5798 NG/L (ref 0–51)
UFH PPP CHRO-ACNC: 0.18 IU/ML
UFH PPP CHRO-ACNC: 0.34 IU/ML
WBC # BLD AUTO: 13.9 K/UL (ref 3.6–11)

## 2024-08-28 PROCEDURE — 2500000003 HC RX 250 WO HCPCS: Performed by: STUDENT IN AN ORGANIZED HEALTH CARE EDUCATION/TRAINING PROGRAM

## 2024-08-28 PROCEDURE — 6370000000 HC RX 637 (ALT 250 FOR IP): Performed by: NURSE PRACTITIONER

## 2024-08-28 PROCEDURE — 6360000004 HC RX CONTRAST MEDICATION: Performed by: STUDENT IN AN ORGANIZED HEALTH CARE EDUCATION/TRAINING PROGRAM

## 2024-08-28 PROCEDURE — 83605 ASSAY OF LACTIC ACID: CPT

## 2024-08-28 PROCEDURE — 6370000000 HC RX 637 (ALT 250 FOR IP): Performed by: STUDENT IN AN ORGANIZED HEALTH CARE EDUCATION/TRAINING PROGRAM

## 2024-08-28 PROCEDURE — 80053 COMPREHEN METABOLIC PANEL: CPT

## 2024-08-28 PROCEDURE — 76937 US GUIDE VASCULAR ACCESS: CPT | Performed by: STUDENT IN AN ORGANIZED HEALTH CARE EDUCATION/TRAINING PROGRAM

## 2024-08-28 PROCEDURE — 36415 COLL VENOUS BLD VENIPUNCTURE: CPT

## 2024-08-28 PROCEDURE — 99152 MOD SED SAME PHYS/QHP 5/>YRS: CPT | Performed by: STUDENT IN AN ORGANIZED HEALTH CARE EDUCATION/TRAINING PROGRAM

## 2024-08-28 PROCEDURE — 6360000002 HC RX W HCPCS: Performed by: NURSE PRACTITIONER

## 2024-08-28 PROCEDURE — 80074 ACUTE HEPATITIS PANEL: CPT

## 2024-08-28 PROCEDURE — 6370000000 HC RX 637 (ALT 250 FOR IP): Performed by: INTERNAL MEDICINE

## 2024-08-28 PROCEDURE — 6370000000 HC RX 637 (ALT 250 FOR IP): Performed by: HOSPITALIST

## 2024-08-28 PROCEDURE — 0202U NFCT DS 22 TRGT SARS-COV-2: CPT

## 2024-08-28 PROCEDURE — 6360000002 HC RX W HCPCS: Performed by: HOSPITALIST

## 2024-08-28 PROCEDURE — 93010 ELECTROCARDIOGRAM REPORT: CPT | Performed by: INTERNAL MEDICINE

## 2024-08-28 PROCEDURE — 2709999900 HC NON-CHARGEABLE SUPPLY: Performed by: STUDENT IN AN ORGANIZED HEALTH CARE EDUCATION/TRAINING PROGRAM

## 2024-08-28 PROCEDURE — 2700000000 HC OXYGEN THERAPY PER DAY

## 2024-08-28 PROCEDURE — 93458 L HRT ARTERY/VENTRICLE ANGIO: CPT | Performed by: STUDENT IN AN ORGANIZED HEALTH CARE EDUCATION/TRAINING PROGRAM

## 2024-08-28 PROCEDURE — C1769 GUIDE WIRE: HCPCS | Performed by: STUDENT IN AN ORGANIZED HEALTH CARE EDUCATION/TRAINING PROGRAM

## 2024-08-28 PROCEDURE — 6360000002 HC RX W HCPCS: Performed by: STUDENT IN AN ORGANIZED HEALTH CARE EDUCATION/TRAINING PROGRAM

## 2024-08-28 PROCEDURE — 2580000003 HC RX 258: Performed by: HOSPITALIST

## 2024-08-28 PROCEDURE — 84484 ASSAY OF TROPONIN QUANT: CPT

## 2024-08-28 PROCEDURE — 87449 NOS EACH ORGANISM AG IA: CPT

## 2024-08-28 PROCEDURE — 85379 FIBRIN DEGRADATION QUANT: CPT

## 2024-08-28 PROCEDURE — 85025 COMPLETE CBC W/AUTO DIFF WBC: CPT

## 2024-08-28 PROCEDURE — 99223 1ST HOSP IP/OBS HIGH 75: CPT | Performed by: INTERNAL MEDICINE

## 2024-08-28 PROCEDURE — C1894 INTRO/SHEATH, NON-LASER: HCPCS | Performed by: STUDENT IN AN ORGANIZED HEALTH CARE EDUCATION/TRAINING PROGRAM

## 2024-08-28 PROCEDURE — 93306 TTE W/DOPPLER COMPLETE: CPT

## 2024-08-28 PROCEDURE — 85520 HEPARIN ASSAY: CPT

## 2024-08-28 PROCEDURE — 93306 TTE W/DOPPLER COMPLETE: CPT | Performed by: STUDENT IN AN ORGANIZED HEALTH CARE EDUCATION/TRAINING PROGRAM

## 2024-08-28 PROCEDURE — 2060000000 HC ICU INTERMEDIATE R&B

## 2024-08-28 RX ORDER — IOPAMIDOL 755 MG/ML
INJECTION, SOLUTION INTRAVASCULAR PRN
Status: DISCONTINUED | OUTPATIENT
Start: 2024-08-28 | End: 2024-08-28 | Stop reason: HOSPADM

## 2024-08-28 RX ORDER — POTASSIUM CHLORIDE 7.45 MG/ML
10 INJECTION INTRAVENOUS ONCE
Status: COMPLETED | OUTPATIENT
Start: 2024-08-28 | End: 2024-08-28

## 2024-08-28 RX ORDER — HEPARIN SODIUM 1000 [USP'U]/ML
INJECTION, SOLUTION INTRAVENOUS; SUBCUTANEOUS PRN
Status: DISCONTINUED | OUTPATIENT
Start: 2024-08-28 | End: 2024-08-28 | Stop reason: HOSPADM

## 2024-08-28 RX ORDER — CARVEDILOL 6.25 MG/1
6.25 TABLET ORAL 2 TIMES DAILY WITH MEALS
Status: DISCONTINUED | OUTPATIENT
Start: 2024-08-28 | End: 2024-08-29 | Stop reason: HOSPADM

## 2024-08-28 RX ORDER — LIDOCAINE HYDROCHLORIDE 10 MG/ML
INJECTION, SOLUTION INFILTRATION; PERINEURAL PRN
Status: DISCONTINUED | OUTPATIENT
Start: 2024-08-28 | End: 2024-08-28 | Stop reason: HOSPADM

## 2024-08-28 RX ORDER — MAGNESIUM SULFATE 1 G/100ML
1000 INJECTION INTRAVENOUS ONCE
Status: COMPLETED | OUTPATIENT
Start: 2024-08-28 | End: 2024-08-28

## 2024-08-28 RX ORDER — METOPROLOL TARTRATE 25 MG/1
25 TABLET, FILM COATED ORAL 2 TIMES DAILY
Status: DISCONTINUED | OUTPATIENT
Start: 2024-08-28 | End: 2024-08-28

## 2024-08-28 RX ORDER — MIDAZOLAM HYDROCHLORIDE 1 MG/ML
INJECTION INTRAMUSCULAR; INTRAVENOUS PRN
Status: DISCONTINUED | OUTPATIENT
Start: 2024-08-28 | End: 2024-08-28 | Stop reason: HOSPADM

## 2024-08-28 RX ORDER — HEPARIN SODIUM 200 [USP'U]/100ML
INJECTION, SOLUTION INTRAVENOUS PRN
Status: DISCONTINUED | OUTPATIENT
Start: 2024-08-28 | End: 2024-08-28 | Stop reason: HOSPADM

## 2024-08-28 RX ORDER — LOSARTAN POTASSIUM 25 MG/1
25 TABLET ORAL DAILY
Status: DISCONTINUED | OUTPATIENT
Start: 2024-08-29 | End: 2024-08-29 | Stop reason: HOSPADM

## 2024-08-28 RX ORDER — VERAPAMIL HYDROCHLORIDE 2.5 MG/ML
INJECTION, SOLUTION INTRAVENOUS PRN
Status: DISCONTINUED | OUTPATIENT
Start: 2024-08-28 | End: 2024-08-28 | Stop reason: HOSPADM

## 2024-08-28 RX ORDER — FENTANYL CITRATE 50 UG/ML
INJECTION, SOLUTION INTRAMUSCULAR; INTRAVENOUS PRN
Status: DISCONTINUED | OUTPATIENT
Start: 2024-08-28 | End: 2024-08-28 | Stop reason: HOSPADM

## 2024-08-28 RX ORDER — ACETAMINOPHEN 325 MG/1
650 TABLET ORAL EVERY 4 HOURS PRN
Status: DISCONTINUED | OUTPATIENT
Start: 2024-08-28 | End: 2024-08-29 | Stop reason: HOSPADM

## 2024-08-28 RX ADMIN — PIPERACILLIN AND TAZOBACTAM 3375 MG: 3; .375 INJECTION, POWDER, LYOPHILIZED, FOR SOLUTION INTRAVENOUS at 06:16

## 2024-08-28 RX ADMIN — CARVEDILOL 6.25 MG: 6.25 TABLET, FILM COATED ORAL at 17:16

## 2024-08-28 RX ADMIN — PIPERACILLIN AND TAZOBACTAM 3375 MG: 3; .375 INJECTION, POWDER, LYOPHILIZED, FOR SOLUTION INTRAVENOUS at 13:57

## 2024-08-28 RX ADMIN — ACETAMINOPHEN 650 MG: 325 TABLET ORAL at 21:03

## 2024-08-28 RX ADMIN — ATORVASTATIN CALCIUM 40 MG: 20 TABLET, FILM COATED ORAL at 21:03

## 2024-08-28 RX ADMIN — PANTOPRAZOLE SODIUM 40 MG: 40 INJECTION, POWDER, FOR SOLUTION INTRAVENOUS at 08:16

## 2024-08-28 RX ADMIN — METOPROLOL TARTRATE 25 MG: 25 TABLET, FILM COATED ORAL at 09:15

## 2024-08-28 RX ADMIN — PROCHLORPERAZINE EDISYLATE 10 MG: 5 INJECTION INTRAMUSCULAR; INTRAVENOUS at 04:54

## 2024-08-28 RX ADMIN — POTASSIUM CHLORIDE 10 MEQ: 7.46 INJECTION, SOLUTION INTRAVENOUS at 09:52

## 2024-08-28 RX ADMIN — MAGNESIUM SULFATE HEPTAHYDRATE 1000 MG: 1 INJECTION, SOLUTION INTRAVENOUS at 09:56

## 2024-08-28 RX ADMIN — PIPERACILLIN AND TAZOBACTAM 3375 MG: 3; .375 INJECTION, POWDER, LYOPHILIZED, FOR SOLUTION INTRAVENOUS at 21:19

## 2024-08-28 RX ADMIN — HEPARIN SODIUM 1700 UNITS: 1000 INJECTION INTRAVENOUS; SUBCUTANEOUS at 11:08

## 2024-08-28 ASSESSMENT — PAIN SCALES - GENERAL: PAINLEVEL_OUTOF10: 2

## 2024-08-28 ASSESSMENT — PAIN DESCRIPTION - LOCATION: LOCATION: GENERALIZED

## 2024-08-28 ASSESSMENT — PAIN SCALES - WONG BAKER: WONGBAKER_NUMERICALRESPONSE: HURTS A LITTLE BIT

## 2024-08-28 ASSESSMENT — PAIN DESCRIPTION - DESCRIPTORS: DESCRIPTORS: DISCOMFORT

## 2024-08-28 NOTE — H&P
[facility][note 3]          [address] [place]  [address]                           HISTORY & PHYSICAL      PATIENT NAME: KANDY MOARES            : 1950  MED REC NO: [id number]                       ROOM: B307  ACCOUNT NO: [id number]                       ADMIT DATE: 2024  PROVIDER: Juan Carlos Abrams MD    PRESENTING COMPLAINT:  Vomiting and diarrhea.    HISTORY OF PRESENT ILLNESS:      The patient is a 74-year-old female who has previous history significant for depression and mild hyperlipidemia, presented to ER because she started having nausea and vomiting yesterday.  The patient states she also had some abdominal pain, but denies having any chest pain.  She had low-grade fever this morning.  At [place] [facility], she had an elevated white count and was given IV antibiotics.  Her lactic acid was also elevated.  Her troponin was 708.  She was given a dose of aspirin and sent here.  The patient's only complaint at this time is persistent nausea.  She has no chest pain or shortness of breath.  The patient was slightly hypothermic in the beginning, but her temperature came up.  Repeat troponin now is 2000.  Cardiology consult has been asked.  COVID/flu is negative.    PAST MEDICAL HISTORY:  Significant for depression.    SOCIAL AND ECONOMIC HISTORY:  The patient does not smoke or drink.  She lives with her .    HOME MEDICATIONS:  Include citalopram 40 mg daily, rosuvastatin 10 mg daily, and Valtrex 500 mg daily.    REVIEW OF SYSTEMS:  Negative except as mentioned in history of present illness.  All systems reviewed, no other positive finding was noted.    FAMILY HISTORY:  Not significant for any coronary artery disease.    CODE STATUS:  Full code.    PHYSICAL EXAMINATION:  GENERAL:  The patient is a 74-year-old female, not in any acute distress.  Resting comfortably.  VITAL SIGNS:  Reveal temperature of 98.2, blood pressure is 134/73, pulse is 88,

## 2024-08-28 NOTE — PROGRESS NOTES
[facility]    Hospitalist Progress Note    NAME: Jami Mitchell   :  1950  MRM:  [id number]    Date/Time of service 2024  8:52 AM    To assist coordination of care and communication with nursing and staff, this note may be preliminary early in the day, but finalized by end of the day.        Assessment and Plan:     Non-ST elevated myocardial infarction / Elevated troponin - Worsening after admit.  Started heparin drip.  Consulted cardiology.  Getting ASA and atorvastatin.  Start metoprolol.  Cath today    Sepsis / Fever / Leukocytosis / Sinus tachycardia - POA, unclear etiology.  Unclear if bacterial.  Procalcitonin undetectable.  Pan CT without any focus of infection.  UA clean.  Check enteric and viral panel.  For no continue empiric Zosyn.    Nausea vomiting and diarrhea / Abdominal pain - POA, unclear etiology.  Viral GE vs referred cardiac symptoms.  NPO for cath.  IVF  Prn zofran.    Hypokalemia / Lactic acid acidosis / Dehydration - POA due to poor PO intake and GI loss.  Lactic acid resolved with IVF. Hydrate and monitor. Replete K    Hyperlipidemia - continue statin      Depression with anxiety - Continue escitalopram      Hx Postmenopausal hormone replacement therapy - Stopped >10 year ago.         Subjective:     Chief Complaint:  nausea better, awaiting cath    ROS:  (bold if positive, if negative)    Tolerating PT   NPO        Objective:     Last 24hrs VS reviewed since prior progress note. Most recent are:    Vitals:    24 2150 24 2327 24 0313 24 0740   BP:  (!) 146/79 (!) 153/93 (!) 136/93   Pulse: (!) 130 (!) 105 (!) 118 (!) 124   Resp:  18 20 16   Temp:  98.6 °F (37 °C) 99 °F (37.2 °C) 97.5 °F (36.4 °C)   TempSrc:  Oral Oral Oral   SpO2:  92% 92% 95%   Weight:       Height:          @[id number]@     No intake or output data in the 24 hours ending 24 0852     Physical Exam:    Gen:  Well-developed, well-nourished, in no acute  the qualitative detection of nucleic acids from SARS-CoV-2, Influenza A, and Influenza B in nasopharyngeal and nasal swab specimens for use under the FDA's Emergency Use Authorization (EUA) only.     Fact sheet for Patients: https://www.fda.gov/media/201980/download  Fact sheet for Healthcare Providers: https://www.fda.fov/media/501740/download                 Other pertinent lab: none    Total time spent with patient: 30 Minutes I personally reviewed chart, notes, data and current medications in the medical record.  I have personally examined and treated the patient at bedside during this period.                  Care Plan discussed with: Patient, Care Manager, Nursing Staff, Consultant/Specialist, and >50% of time spent in counseling and coordination of care    Discussed:  Care Plan and D/C Planning    Prophylaxis:  Hep SQ and H2B/PPI    Disposition:  Home w/Family           ___________________________________________________    Attending Physician: Octavio Johnson MD

## 2024-08-28 NOTE — PROGRESS NOTES
2330  Critical trop of 4322.  Notified Marcela Weeks NP.  Will continue to trend.      0336  Received a call from Yamileth in the lab for a critical troponin of 4874.  Patient denies chest pain.  Notified Marcela ARANGO.  New orders received.      0700  Bedside and Verbal shift change report given to April (oncoming nurse) by Lashay (offgoing nurse). Report included the following information Nurse Handoff Report.

## 2024-08-28 NOTE — PROGRESS NOTES
Reviewed with Dr. Alexander, [facility] shows no obstructive CAD.  Echo shows evidence of takotsubo's cardiomyopathy.  EF 34 %.  Change Lopressor to coreg tonight and start low dose losartan in a.m. Stopped IVF as LVEDP is elevated

## 2024-08-28 NOTE — PROGRESS NOTES
3:33 PM  TRANSFER - OUT REPORT:    Verbal report given to dayshift nurse on Jami Mitchell  being transferred to back to room for routine progression of patient care       Report consisted of patient's Situation, Background, Assessment and   Recommendations(SBAR).     Information from the following report(s) Nurse Handoff Report was reviewed with the receiving nurse.           Lines:   Peripheral IV 08/28/24 Left;Posterior Hand (Active)   Site Assessment Clean, dry & intact 08/28/24 1126   Line Status Brisk blood return;Infusing 08/28/24 1126   Line Care Cap changed;Connections checked and tightened 08/28/24 1126   Phlebitis Assessment No symptoms 08/28/24 1126   Infiltration Assessment 0 08/28/24 1126   Alcohol Cap Used Yes 08/28/24 1126   Dressing Status New dressing applied;Clean, dry & intact 08/28/24 1126   Dressing Type Transparent 08/28/24 1126        Opportunity for questions and clarification was provided.      Patient transported with:  Registered Nurse    1605  Radial site looked at with both nurses at bedside, myself and asha Kowalski.  Clean dry and intact.  Tr band still on, gave instructions when to remove with the supplies.

## 2024-08-28 NOTE — PROCEDURES
PROCEDURE NOTE  Date: 8/28/2024   Name: Jami Mitchell  YOB: 1950    Procedures      BRIEF PROCEDURE NOTE    Date of Procedure: 8/28/2024   Preoperative Diagnosis: Elevated troponin  Postoperative Diagnosis: No significant obstructive coronary artery disease  Procedure: Left heart cath, coronary angiography, moderate sedation  Interventional Cardiologist: Jeevan Alexander DO  Assistant: None  Anesthesia: local + IV moderate sedation   I administered moderate sedation throughout this procedure. An independent trained observer pushed medications at my direction, and monitored the patient’s level of consciousness and physiological status throughout.  Estimated Blood Loss: Minimal    Access: Right radial artery, 5F.  Required 0.018 Glidewire along with versa core to traverse right radial artery  Catheters:  Left coronary:JL 3.5, 5f  Right coronary: JR 4, 5f    Findings:   L Main: Large-caliber vessel, no significant obstructive coronary artery disease  LAD: Large-caliber vessel, small hide D1, moderate D2, no significant coronary artery disease by coronary angiography  LCx: Large-caliber vessel, hide moderate OM1 with focal moderate proximal stenosis, moderate AV groove circumflex without significant disease  RCA: Large-caliber vessel, RV marginal supplying distal interventricular septum with small PDA providing proximal interventricular septum, moderate PL branch    LVEDP:  23 mmhg      Specimens Removed: None    Implants: Not applicable    Closure Device: radial TR band    See full cath note.    Complications: none      Findings:  1.  No significant coronary artery disease by coronary angiography  2.  Elevated LVEDP    Plan:    Medical therapy for Takotsubo cardiomyopathy    DO Jeevan Scanlon DO  [address] [address], [address]  [place] [address]                                              Office [phone],Fax [phone]       [Father] : father

## 2024-08-28 NOTE — CARE COORDINATION
Care Management Initial Assessment Note:        08/28/24 0843   Service Assessment   Patient Orientation Alert and Oriented   History Provided By Medical Record   Support Systems Spouse/Significant Other   Prior Functional Level Independent in ADLs/IADLs   Social/Functional History   Lives With Spouse   Discharge Planning   Patient expects to be discharged to: House   Services At/After Discharge   Mode of Transport at Discharge Other (see comment)  (family)   Confirm Follow Up Transport Self     Patient with low readmission risk score of 7%. No identified CM needs at this time. Please consult CM for any discharge planning needs that may arise.   ______________________  Melissa BRUNNER, RN  Care Management  8/28/2024  8:48 AM

## 2024-08-28 NOTE — FLOWSHEET NOTE
Repeat trop 3,528. No CP. Hep gtt infusing. Cards consulted for AM. Will trend to peak. Repeat lactic acid 2.6 Additional fluid bolus ordered. Will trend until normalized.    ADDENDUM: Repeat trop 4,874. No CP. Will trend to peak. Lactic acid 1.5 s/p bolus. Continue to monitor.

## 2024-08-28 NOTE — PROGRESS NOTES
[facility] is tentatively scheduled for 2:15 PM today with Dr. Alexander.

## 2024-08-28 NOTE — CONSULTS
ATTENDING CARDIOLOGIST  The patient was personally examined and chart reviewed. All the elements of history and examination were personally performed and I agree with the plan as listed by advanced practice provider.    Treatment plan was addressed with the patient.      Subjective:  Epigastric burning  Nausea  Vomiting  EKG nonspecific ST change  hsTrp now >5,000    Prior tobacco    Blood pressure (!) 136/93, pulse (!) 106, temperature 97.5 °F (36.4 °C), temperature source Oral, resp. rate 16, height 1.575 m (5' 2\"), weight 56.3 kg (124 lb 1.6 oz), SpO2 95%.  Normal rate, regular rhythm, S1/S2  Lungs clear      A/P:  NSTEMI -possible anginal equivalent with nausea vomiting epigastric burning.  EKG nonspecific ST changes.  High-sensitivity troponin now greater than 5700.  Echo has been ordered.  Discussed need for cardiac catheterization with patient and her daughter at the bedside.  She is amenable to proceeding.  ASA, atorva 40, metoprolol 25 BID, heparin gtt.      Epigastric burning, nausea, vomiting - possible anginal equivalent.  Cath as above.  CT CAP unrevealing.   LFTS wnl.  Zofran has helped.     Prediabetes - A1C 5.8.  Prior tobacco - quite 15 + years ago  Mild leukocytosis - afebrile, possibly reactive.  No evidence of infectious process as of yet.  COVID negative.          []    High complexity decision making was performed  []    Patient is at high-risk of decompensation with multiple organ involvement        Kayla Avery MS, MD, [facility]        Kayla Avery MS, MD, FACC  [facility]  [phone] (P)  [phone] (F)                                                                                         [facility] CARDIOLOGY  Cardiology Care Note                  [x]Initial visit     []Established visit     Patient Name: Jami Mitchell - :1950 - MRN:[id number]  Primary Cardiologist: None  Consulting Cardiologist: Kayla Avery MD     Reason for initial visit: elevated  troponin    HPI:   Ms. Mitchell is a 74 y.o. female with PMH of HLD on statin, . She presents with chief c/o vomiting, diarrhea and abdominal pain. Cardiology consulted due to elevated troponins on evaluation.  Pt reports 2 days ago she got nauseated and vomited while having a BM.  Symptoms subsided but occurred again when having BM prior to presentation.  She denied having any chest pain, pressure, SOB, dizziness/lightheadedness palpitations. She did have some epigastric \"indigestion/burning\" with BM/symptoms.  Denies any exertional symptoms including STALEY.  She went to [place] ER where she was found to have leukocytosis and started on antibiotics. Lactic acid was 2.6.  She denies any fevers, chills, dysuria. She was slightly hypothermic initially.  Initial HS troponin 790 but trended up (708, 2087, 3528, 4322, 4874). EKG shows NSR with nonspecific st abnormality, Sbb=579.  Covid/flu negative.  She was started on IV ACS heparin overnight.   A1c=5.8 - pt denies hx of DM  SH: former smoker, no ETOH. Uses Marijuana.  FH: mom had CVA and ?MI at age 84  Other: previously used HRT but stopped over 10 years ago.     SUBJECTIVE:  Denies any chest pain. Repeat Troponin >5000 now.  Sinus tachycardia on telemetry.  Denies SOB.  No nausea currently. States IV med helped.       Assessment and Plan     NSTEMI: HS troponin trended up to >5000.  EKG showed NSR with nonspecific st abnormality.  No chest pain or exertional symptoms reported. Did have some epigastric indigestion with nausea.  Concern for ACS.  Continue ASA, statin, IV heparin. Agree with starting po metoprolol. Plan for LHC today. Reviewed procedure with pt and she is agreeable to proceed.  Cath lab notified.  Keep NPO. Echo pending.     HLD: , HDL 73 on 3/8/24. Continue atorvastatin    Leukocytosis/sepsis: Covid negative but viral panel pending.  Management per primary team.  Lactic now 1.5.   Prolonged Qtc:  Qtc 502.  Avoid qtc prolonging meds. Hold celexa  Admission: No IO data has been entered for this period [08/28/24 0916]     Physical Exam:    Vitals:   Vitals:    08/27/24 2327 08/28/24 0313 08/28/24 0740 08/28/24 0915   BP: (!) 146/79 (!) 153/93 (!) 136/93 (!) 163/73   Pulse: (!) 105 (!) 118 (!) 124 (!) 106   Resp: 18 20 16    Temp: 98.6 °F (37 °C) 99 °F (37.2 °C) 97.5 °F (36.4 °C)    TempSrc: Oral Oral Oral    SpO2: 92% 92% 95%    Weight:       Height:         Telemetry: sinus tachycardia    Gen: Well-developed, well-nourished, in no acute distress  Neck: Supple,   Resp: No accessory muscle use, Clear breath sounds, No rales or rhonchi  Card: Regular Rate,Rhythm, Normal S1, S2, No murmurs, rubs or gallop. No thrills.   Abd:   Soft, non-tender, non-distended, BS+   MSK: No cyanosis  Skin: No rashes    Neuro: Moving all four extremities, follows commands appropriately  Psych:  Oriented to person, place, alert, Nml Affect  LE: No lower extremity edema          Data Review:     Radiology:   XR Results (most recent):  CT Result (most recent):  CT CHEST ABDOMEN PELVIS W CONTRAST 08/27/2024    Narrative  INDICATION: hypoxia, abdominal pain, nausea/vomting    COMPARISON: 1/25/2013    TECHNIQUE:  Following the uneventful intravenous administration of 100 cc  Isovue-370, 5 mm axial images were obtained through the chest, abdomen, and  pelvis.  Coronal and sagittal reconstructions were generated. CT dose reduction  was achieved through use of a standardized protocol tailored for this  examination and automatic exposure control for dose modulation.    ORAL CONTRAST: NONE.    FINDINGS:    MEDIASTINUM:No mass or lymphadenopathy.  CEDRIC: No mass or lymphadenopathy.  THORACIC AORTA: No dissection or aneurysm.  MAIN PULMONARY ARTERY: Normal in caliber.  TRACHEA/BRONCHI: Patent.  ESOPHAGUS: No wall thickening or dilatation.  HEART: Normal in size.  Coronary artery calcium:  present  PLEURA: No effusion or pneumothorax.  LUNGS: No nodule, mass, or airspace disease.  LIVER: No mass  or biliary dilatation. There is a 1.8 cm cyst in the left lateral  hepatic lobe.  GALLBLADDER: Unremarkable.  SPLEEN: No mass.  PANCREAS: No mass or ductal dilatation.  ADRENALS: There is an unchanged 2.0 cm nodule in the right adrenal gland. This  likely represents an incidental adrenal adenoma..  KIDNEYS: No mass, calculus, or hydronephrosis.  STOMACH: Unremarkable.  SMALL BOWEL: No dilatation or wall thickening.  COLON: No dilatation or wall thickening.  APPENDIX: Not well seen.  PERITONEUM: No ascites or pneumoperitoneum.  RETROPERITONEUM: No lymphadenopathy or aortic aneurysm.  REPRODUCTIVE ORGANS: The uterus is unremarkable.  URINARY BLADDER: No mass or calculus. The bladder is distended.  BONES: No destructive bone lesion.  ADDITIONAL COMMENTS: N/A    Impression  No evidence of acute process.    Electronically signed by MARGARITA ARMAS    Xray Result (most recent):  No results found for this or any previous visit from the past 3650 days.        Recent Labs     08/27/24 1429 08/27/24 1946 08/28/24  0234     --  137   K 3.2*  --  3.4*     --  107   CO2 19*  --  21   BUN 17  --  12   MG 1.7  --   --    ALT 20  --  20   INR  --  1.1  --      Recent Labs     08/27/24 1429 08/27/24 1946 08/28/24  0234   WBC 18.0* 12.7* 13.9*   HGB 13.7 14.8 13.7   HCT 41.1 44.1 40.0   * 383 395     Creatinine (MG/DL)   Date Value   08/28/2024 0.69   08/27/2024 0.91   03/08/2024 0.69   01/30/2023 0.75   06/15/2022 0.68       Current meds:    Current Facility-Administered Medications:     metoprolol tartrate (LOPRESSOR) tablet 25 mg, 25 mg, Oral, BID, Octavio Johnson MD, 25 mg at 08/28/24 0915    potassium chloride 10 mEq/100 mL IVPB (Peripheral Line), 10 mEq, IntraVENous, Once, Tresa Stark, APRN - NP    magnesium sulfate 1000 mg in dextrose 5% 100 mL IVPB, 1,000 mg, IntraVENous, Once, Tresa Stark, APRN - NP    aspirin chewable tablet 81 mg, 81 mg, Oral, Daily, Juan Carlos Abrams MD    heparin (porcine)

## 2024-08-28 NOTE — PROGRESS NOTES
0700 Bedside and Verbal shift change report given to SATINDER Kowalski and SATINDER Norman (oncoming nurse) by SATINDER Metz (offgoing nurse). Report included the following information Nurse Handoff Report, Recent Results, Med Rec Status, Cardiac Rhythm NSR - Sinus Tach, and Neuro Assessment.     0930 Notified MD of Troponin level 5,798    1625 2ml air removed from TR band, site clean, dry & intact. VSS /83 HR 80     1630 2ml air removed from TR band, site clean, dry & intact. VSS /85 HR 88    1635 2ml air removed from TR band, site clean, dry & intact. VSS /79 HR 83    1640 2ml air removed from TR band, site clean, dry and intact. /92 HR 89    1645 2ml air removed from TR band, site clean, dry and intact. /82 HR 89    1648 TR band removed. Site clean, dry and intact    1900 Bedside and Verbal shift change report given to SATINDER Metz (oncoming nurse) by SATINDER Kowalski and SATINDER Norman (offgoing nurse). Report included the following information Nurse Handoff Report, Recent Results, Med Rec Status, Cardiac Rhythm NSR , and Quality Measures.

## 2024-08-28 NOTE — PROGRESS NOTES
3:25 PM  TRANSFER - IN REPORT:    Verbal report received from Anahi on Jami Mitchell  being received from Cath Lab for routine post-op      Report consisted of patient's Situation, Background, Assessment and   Recommendations(SBAR).     Information from the following report(s) Nurse Handoff Report was reviewed with the receiving nurse.    Opportunity for questions and clarification was provided.      Assessment completed upon patient's arrival to unit and care assumed.

## 2024-08-29 VITALS
HEART RATE: 98 BPM | BODY MASS INDEX: 22.82 KG/M2 | TEMPERATURE: 98.4 F | DIASTOLIC BLOOD PRESSURE: 86 MMHG | HEIGHT: 62 IN | RESPIRATION RATE: 16 BRPM | WEIGHT: 124 LBS | SYSTOLIC BLOOD PRESSURE: 125 MMHG | OXYGEN SATURATION: 96 %

## 2024-08-29 DIAGNOSIS — I22.2 ACUTE NON-ST-ELEVATION MI FOLLOWING PREVIOUS MI (HCC): Primary | ICD-10-CM

## 2024-08-29 LAB
ALBUMIN SERPL-MCNC: 3.2 G/DL (ref 3.5–5)
ALBUMIN/GLOB SERPL: 1.1 (ref 1.1–2.2)
ALP SERPL-CCNC: 54 U/L (ref 45–117)
ALT SERPL-CCNC: 19 U/L (ref 12–78)
ANION GAP SERPL CALC-SCNC: 7 MMOL/L (ref 5–15)
AST SERPL-CCNC: 36 U/L (ref 15–37)
BILIRUB SERPL-MCNC: 0.7 MG/DL (ref 0.2–1)
BUN SERPL-MCNC: 20 MG/DL (ref 6–20)
BUN/CREAT SERPL: 30 (ref 12–20)
CALCIUM SERPL-MCNC: 8.6 MG/DL (ref 8.5–10.1)
CHLORIDE SERPL-SCNC: 107 MMOL/L (ref 97–108)
CO2 SERPL-SCNC: 23 MMOL/L (ref 21–32)
CREAT SERPL-MCNC: 0.67 MG/DL (ref 0.55–1.02)
ERYTHROCYTE [DISTWIDTH] IN BLOOD BY AUTOMATED COUNT: 14.4 % (ref 11.5–14.5)
GLOBULIN SER CALC-MCNC: 2.8 G/DL (ref 2–4)
GLUCOSE SERPL-MCNC: 112 MG/DL (ref 65–100)
HCT VFR BLD AUTO: 35.6 % (ref 35–47)
HGB BLD-MCNC: 12.3 G/DL (ref 11.5–16)
MAGNESIUM SERPL-MCNC: 2.2 MG/DL (ref 1.6–2.4)
MCH RBC QN AUTO: 30.5 PG (ref 26–34)
MCHC RBC AUTO-ENTMCNC: 34.6 G/DL (ref 30–36.5)
MCV RBC AUTO: 88.3 FL (ref 80–99)
NRBC # BLD: 0 K/UL (ref 0–0.01)
NRBC BLD-RTO: 0 PER 100 WBC
PHOSPHATE SERPL-MCNC: 2.6 MG/DL (ref 2.6–4.7)
PLATELET # BLD AUTO: 334 K/UL (ref 150–400)
PMV BLD AUTO: 9.8 FL (ref 8.9–12.9)
POTASSIUM SERPL-SCNC: 3.3 MMOL/L (ref 3.5–5.1)
PROT SERPL-MCNC: 6 G/DL (ref 6.4–8.2)
RBC # BLD AUTO: 4.03 M/UL (ref 3.8–5.2)
SODIUM SERPL-SCNC: 137 MMOL/L (ref 136–145)
WBC # BLD AUTO: 14 K/UL (ref 3.6–11)

## 2024-08-29 PROCEDURE — 83735 ASSAY OF MAGNESIUM: CPT

## 2024-08-29 PROCEDURE — 80053 COMPREHEN METABOLIC PANEL: CPT

## 2024-08-29 PROCEDURE — 6370000000 HC RX 637 (ALT 250 FOR IP): Performed by: INTERNAL MEDICINE

## 2024-08-29 PROCEDURE — 6360000002 HC RX W HCPCS: Performed by: HOSPITALIST

## 2024-08-29 PROCEDURE — 94761 N-INVAS EAR/PLS OXIMETRY MLT: CPT

## 2024-08-29 PROCEDURE — 2580000003 HC RX 258: Performed by: HOSPITALIST

## 2024-08-29 PROCEDURE — 99233 SBSQ HOSP IP/OBS HIGH 50: CPT | Performed by: INTERNAL MEDICINE

## 2024-08-29 PROCEDURE — 6370000000 HC RX 637 (ALT 250 FOR IP): Performed by: NURSE PRACTITIONER

## 2024-08-29 PROCEDURE — 85027 COMPLETE CBC AUTOMATED: CPT

## 2024-08-29 PROCEDURE — 84100 ASSAY OF PHOSPHORUS: CPT

## 2024-08-29 PROCEDURE — 36415 COLL VENOUS BLD VENIPUNCTURE: CPT

## 2024-08-29 PROCEDURE — 6370000000 HC RX 637 (ALT 250 FOR IP): Performed by: HOSPITALIST

## 2024-08-29 PROCEDURE — APPSS30 APP SPLIT SHARED TIME 16-30 MINUTES: Performed by: NURSE PRACTITIONER

## 2024-08-29 RX ORDER — POTASSIUM CHLORIDE 750 MG/1
20 TABLET, EXTENDED RELEASE ORAL 2 TIMES DAILY
Status: DISCONTINUED | OUTPATIENT
Start: 2024-08-29 | End: 2024-08-29 | Stop reason: HOSPADM

## 2024-08-29 RX ORDER — CARVEDILOL 6.25 MG/1
6.25 TABLET ORAL 2 TIMES DAILY WITH MEALS
Qty: 60 TABLET | Refills: 3 | Status: SHIPPED | OUTPATIENT
Start: 2024-08-29

## 2024-08-29 RX ORDER — LOSARTAN POTASSIUM 25 MG/1
25 TABLET ORAL DAILY
Qty: 30 TABLET | Refills: 3 | Status: SHIPPED | OUTPATIENT
Start: 2024-08-29

## 2024-08-29 RX ADMIN — PIPERACILLIN AND TAZOBACTAM 3375 MG: 3; .375 INJECTION, POWDER, LYOPHILIZED, FOR SOLUTION INTRAVENOUS at 06:14

## 2024-08-29 RX ADMIN — PANTOPRAZOLE SODIUM 40 MG: 40 INJECTION, POWDER, FOR SOLUTION INTRAVENOUS at 09:28

## 2024-08-29 RX ADMIN — LOSARTAN POTASSIUM 25 MG: 25 TABLET, FILM COATED ORAL at 10:34

## 2024-08-29 RX ADMIN — CITALOPRAM HYDROBROMIDE 40 MG: 20 TABLET ORAL at 09:28

## 2024-08-29 RX ADMIN — CARVEDILOL 6.25 MG: 6.25 TABLET, FILM COATED ORAL at 09:29

## 2024-08-29 RX ADMIN — POTASSIUM CHLORIDE 20 MEQ: 750 TABLET, EXTENDED RELEASE ORAL at 09:28

## 2024-08-29 RX ADMIN — ASPIRIN 81 MG: 81 TABLET, CHEWABLE ORAL at 09:28

## 2024-08-29 NOTE — CARE COORDINATION
Care Management Discharge Note:      08/29/24 1104   Discharge Planning   Patient expects to be discharged to: House   Services At/After Discharge   Transition of Care Consult (CM Consult) N/A   Services At/After Discharge None   Mode of Transport at Discharge Other (see comment)  (family)   Confirm Follow Up Transport Self     Patient with dc orders. No identified CM needs. Patient will dc home with family to transport.   ______________________  Melissa BRUNNER, RN  Care Management  8/29/2024  11:05 AM

## 2024-08-29 NOTE — PROGRESS NOTES
[facility]    Hospitalist Progress Note    NAME: Jami Mitchell   :  1950  MRM:  [id number]    Date/Time of service 2024  8:17 AM    To assist coordination of care and communication with nursing and staff, this note may be preliminary early in the day, but finalized by end of the day.        Assessment and Plan:     Takotsubo cardiomyopathy / Non-ST elevated myocardial infarction / Elevated troponin - Consulted cardiology.  Cath  without CAD.  Presumed takoksubo.  Getting ASA and atorvastatin.  Start coreg and losartan.  Outpatient follow up    Sepsis / Fever / Leukocytosis / Sinus tachycardia - POA, unclear etiology.  Unclear if bacterial.  Procalcitonin undetectable.  WBC mild and stable. Pan CT without any focus of infection.  UA clean.  Check enteric and viral panel.  Stop empiric Zosyn.    Nausea vomiting and diarrhea / Abdominal pain - POA, unclear etiology.  Viral GE likely.  Resume diet.  IVF  Prn zofran.    Hypokalemia / Lactic acid acidosis / Dehydration - POA due to poor PO intake and GI loss.  Lactic acid resolved with IVF. Hydrate and monitor. Replete K PO    Hyperlipidemia - continue statin      Depression with anxiety - Continue escitalopram      Hx Postmenopausal hormone replacement therapy - Stopped >10 year ago.       Subjective:     Chief Complaint:  nausea better, tolerated cath    ROS:  (bold if positive, if negative)    Tolerating PT   NPO        Objective:     Last 24hrs VS reviewed since prior progress note. Most recent are:    Vitals:    24 2140 24 2312 24 0313 24 0736   BP:  117/76 124/76 (!) 129/92   Pulse: (!) 103 91 94 98   Resp:  18 16 16   Temp:  98.2 °F (36.8 °C) 98.4 °F (36.9 °C) 98.4 °F (36.9 °C)   TempSrc:   Oral Oral   SpO2:  91% 96% 96%   Weight:       Height:          @[id number]@       Intake/Output Summary (Last 24 hours) at 2024 0817  Last data filed at 2024 2119  Gross per 24 hour   Intake 150  ml   Output 5 ml   Net 145 ml        Physical Exam:    Gen:  Well-developed, well-nourished, in no acute distress  HEENT:  Pink conjunctivae, PERRL, hearing intact to voice, moist mucous membranes  Neck:  Supple, without masses, thyroid non-tender  Resp:  No accessory muscle use, clear breath sounds without wheezes rales or rhonchi  Card:  No murmurs, normal S1, S2 without thrills, bruits or peripheral edema  Abd:  Soft, non-tender, non-distended, normoactive bowel sounds are present, no mass  Lymph:  No cervical or inguinal adenopathy  Musc:  No cyanosis or clubbing  Skin:  No rashes or ulcers, skin turgor is good  Neuro:  Cranial nerves are grossly intact, no focal motor weakness, follows commands appropriately  Psych:  Good insight, oriented to person, place and time, alert    Telemetry reviewed:   normal sinus rhythm  __________________________________________________________________  Medications Reviewed: (see below)  Medications:     Current Facility-Administered Medications   Medication Dose Route Frequency    acetaminophen (TYLENOL) tablet 650 mg  650 mg Oral Q4H PRN    carvedilol (COREG) tablet 6.25 mg  6.25 mg Oral BID WC    losartan (COZAAR) tablet 25 mg  25 mg Oral Daily    aspirin chewable tablet 81 mg  81 mg Oral Daily    atorvastatin (LIPITOR) tablet 40 mg  40 mg Oral Nightly    [Held by provider] ondansetron (ZOFRAN) injection 4 mg  4 mg IntraVENous Q6H PRN    prochlorperazine (COMPAZINE) injection 10 mg  10 mg IntraVENous Q6H PRN    piperacillin-tazobactam (ZOSYN) 3,375 mg in sodium chloride 0.9 % 50 mL IVPB (mini-bag)  3,375 mg IntraVENous q8h    pantoprazole (PROTONIX) 40 mg in sodium chloride (PF) 0.9 % 10 mL injection  40 mg IntraVENous Daily    [Held by provider] citalopram (CELEXA) tablet 40 mg  40 mg Oral Daily        Lab Data Reviewed: (see below)  Lab Review:     Recent Labs     08/27/24  1946 08/28/24  0234 08/29/24  0538   WBC 12.7* 13.9* 14.0*   HGB 14.8 13.7 12.3   HCT 44.1 40.0 35.6

## 2024-08-29 NOTE — PROGRESS NOTES
To assist primary nurse with discharge, I have completed the AVS Med-updates and added information on new medications and Takotsubo.to the AVS. Care Plans and Education were resolved and the AVS has been printed and placed on the chart for the nurse.

## 2024-08-29 NOTE — CARDIO/PULMONARY
[facility]- Referral  Chart review completed.  Noted: NSTEMI, Cardiomyopathy.  Cath without CAd  Met with patient: in room 307, daughter is present  MI patient meets criteria for outpatient cardiac rehab.  [facility] outpatient cardiac rehab referral will be initiated.    Educational handouts reviewed and provided on: MI procedure, coronary artery disease, coronary artery risk factors, heart healthy eating, and community resources.    The format and benefits of enrolling in a cardiac rehab Phase II program were communicated.   All questions answered.  Pt lives 1 hour away.  [facility] is 45 minutes.  Pt has Medicare Parts A/B but no secondary.  Reference information on [facility] also provided.  [facility] staff will contact patIent, per telephone call, to assess and schedule program participation.  Pt is interested if she can financially afford to enroll and is able to tolerate the distance.  STEVE Parks RN

## 2024-08-29 NOTE — PROGRESS NOTES
0700 Bedside and Verbal shift change report given to SATINDER Kowalski and SATINDER Norman (oncoming nurse) by SATINDER Metz (offgoing nurse). Report included the following information Nurse Handoff Report, Recent Results, Med Rec Status, Cardiac Rhythm NSR, and Quality Measures.

## 2024-08-29 NOTE — PLAN OF CARE
Problem: Discharge Planning  Goal: Discharge to home or other facility with appropriate resources  8/28/2024 2340 by aLshay Balck RN  Outcome: Progressing  8/28/2024 1024 by Meghana Begum RN  Outcome: Progressing     Problem: ABCDS Injury Assessment  Goal: Absence of physical injury  8/28/2024 2340 by Lashay Black RN  Outcome: Progressing  8/28/2024 1024 by Meghana Begum RN  Outcome: Progressing     Problem: Safety - Adult  Goal: Free from fall injury  Outcome: Progressing     Problem: Skin/Tissue Integrity  Goal: Absence of new skin breakdown  Description: 1.  Monitor for areas of redness and/or skin breakdown  2.  Assess vascular access sites hourly  3.  Every 4-6 hours minimum:  Change oxygen saturation probe site  4.  Every 4-6 hours:  If on nasal continuous positive airway pressure, respiratory therapy assess nares and determine need for appliance change or resting period.  Outcome: Progressing

## 2024-08-29 NOTE — DISCHARGE INSTRUCTIONS
HOSPITALIST DISCHARGE INSTRUCTIONS  NAME:  Jami Mitchell   :  1950   MRN:  [id number]     Date/Time:  2024 10:25 AM    ADMIT DATE: 2024     DISCHARGE DATE: 2024     DISCHARGE DIAGNOSIS:  Takotsubo cardiomyopathy    DISCHARGE INSTRUCTIONS:  Thank you for allowing us to participate in your care. Your discharging Hospitalist is Octavio Johnson MD. You were admitted for evaluation and treatment of the following:    Takotsubo cardiomyopathy / Non-ST elevated myocardial infarction / Elevated troponin - We consulted cardiology.  A cath on  shows no coronary disease.  You have takoksubo cardiomyopathy.  Start coreg and losartan.  Outpatient follow up    Fever / Leukocytosis / Sinus tachycardia / Nausea vomiting and diarrhea / Abdominal pain - We did no find any bacterial infection. You likely had a viral gastroenteritis    Hypokalemia / Lactic acid acidosis / Dehydration - This was due to poor PO intake and GI loss.  We gave you hydration.    Hyperlipidemia - Continue rosuvastatin      Depression with anxiety - Continue escitalopram    MEDICATIONS:    It is important that you take the medication exactly as they are prescribed.   Keep your medication in the bottles provided by the pharmacist and keep a list of the medication names, dosages, and times to be taken in your wallet.   Do not take other medications without consulting your doctor.          Broken Heart Syndrome: Care Instructions  Your Care Instructions     With broken heart syndrome, the heart has trouble pumping blood normally. A chamber of the heart swells up like a small balloon. Broken heart syndrome is also called takotsubo (say \"KPON-nc-ecc-boh\") syndrome or stress cardiomyopathy (say \"[id number]-[id number]-ro-aq-XGP-wn-thee\").  Broken heart syndrome is often triggered by great emotional stress, such as grief after losing a loved one. It can also be triggered by physical stress, such as having a serious health problem. Sometimes the  post-procedure.   CALL THE PHYSICIANS:   If the site becomes red, swollen or feels warm to the touch   If there is bleeding or drainage or if there is unusual pain at the radial site.   If there is any minor oozing, you may apply a band-aid and remove after 12 hours.   If the bleeding continues, hold pressure with the middle finger against the puncture site and the thumb against the back of the wrist,call 911 to be transported to the hospital.   DO NOT DRIVE YOURSELF, OR HAVE ANYONE ELSE DRIVE YOU - CALL 911.   ACTIVITY:   For the first 24 hours do not manipulate the wrist.   No lifting, pushing or pulling over 3-5 pounds with the affected wrist for 7 daysand no straining the insertion site. Do not life grocery bags or the garbage can, do not run the vacuum  or  for 7 days.   Start with short walks as in the hospital and gradually increase as tolerated each day. It is recommended to walk 30 minutes 5-7 days per week.   Follow your physician’s instructions on activity. Avoid walking outside in extremes of heat or cold. Walk inside when it is cold and windy or hot and humid.   Things to keep in mind:   No driving for at least 24 hours, or as designated by your physician.   Limit the number of times you go up and down the stairs   Take rests and pace yourself with activity.   Be careful and do not strain with bowel movements.   MEDICATIONS:   Take all medications as prescribed   Call your physician if you have any questions   Keep an updated list of your medications with you at all times and give a list to your physician and pharmacist   SIGNS AND SYMPTOMS:   Be cautious of symptoms of angina or recurrent symptoms such as chest discomfort, unusual shortness of breath or fatigue. These could be symptoms of restenosis, a new blockage or a heart attack.   If your symptoms are relieved with rest it is still recommended that you notify your physician of recurrent chest pain or discomfort.   For CHEST PAIN  or symptoms of angina not relieved with rest: If the discomfort is not relieved with rest, and you have been prescribed Nitroglycerin, take as directed (taken under the tongue, one at a time 5 minutes apart for a total of 3 doses). If the discomfort is not relieved after the 3rd nitroglycerin, call 911. If you have not been prescribed Nitroglycerin and your chest discomfort is not relieved with rest, call 911.   AFTER CARE:   Follow up with your physician as instructed.   Follow a heart healthy diet with proper portion control, daily stress management, daily exercise, blood pressure and cholesterol control , and smoking cessation.

## 2024-08-29 NOTE — PROGRESS NOTES
ATTENDING CARDIOLOGIST  The patient was personally examined and chart reviewed. All the elements of history and examination were personally performed and I agree with the plan as listed by advanced practice provider.    Treatment plan was addressed with the patient.      Subjective:  No obstructive CAD on cath    Blood pressure 125/86, pulse 98, temperature 98.4 °F (36.9 °C), temperature source Oral, resp. rate 16, height 1.575 m (5' 2\"), weight 56.2 kg (124 lb), SpO2 96%.  Normal rate, regular rhythm, S1/S2  Lungs clear      A/P:  NSTEMI: HS troponin trended up to >5000.  EKG showed NSR with nonspecific st abnormality.  No chest pain or exertional symptoms reported. Did have some epigastric indigestion with nausea.  S/p LHC without significant CAD, echo w/ evidence of Takotsubo's w/ an EF of 34%. Cont coreg, losartan. Appears volume compensated, will adjust GDMT further in OP setting     HLD: , HDL 73 on 3/8/24. Continue atorvastatin    Leukocytosis/sepsis: possible viral GI illness.      Prolonged Qtc:  Qtc improving, consider switching to something other than celexa    Hypokalemia: replete PRN    Pt is stable for d/c from cardiac standpoint on current meds, will arrange OP follow up         []    High complexity decision making was performed  []    Patient is at high-risk of decompensation with multiple organ involvement        Kayla Avery MS, MD, FACC        Kayla Avery MS, MD, FACC  [facility]  [phone] (P)  [phone] (F)                                                                                           [facility] CARDIOLOGY  Cardiology Care Note                  []Initial visit     [x]Established visit     Patient Name: Jami Mitchell - :1950 - MRN:[id number]  Primary Cardiologist: None  Consulting Cardiologist: Kayla Avery MD     Reason for initial visit: elevated troponin    HPI:   Ms. Mitchell is a 74 y.o. female with PMH of HLD on statin, . She  HISTORY      sq cell L lower leg- 2020     Social Hx:  reports that she quit smoking about 19 years ago. Her smoking use included cigarettes. She has never used smokeless tobacco. She reports current alcohol use. She reports that she does not use drugs.  Family Hx: family history includes Breast Cancer in her niece; Breast Cancer (age of onset: 50) in her sister; Cancer in her sister; Cancer (age of onset: 47) in her father; Diabetes in her brother and sister; Heart Attack (age of onset: 65) in her maternal grandmother; Psychiatric Disorder in her sister; Stroke (age of onset: 85) in her mother.  Allergies   Allergen Reactions    Bupropion      Other reaction(s): Unable to Obtain          OBJECTIVE:  Wt Readings from Last 3 Encounters:   08/28/24 56.2 kg (124 lb)   03/08/24 59 kg (130 lb)   01/30/23 56.7 kg (125 lb)     Net IO Since Admission: 145 mL [08/29/24 1007]     Physical Exam:    Vitals:   Vitals:    08/29/24 0313 08/29/24 0700 08/29/24 0736 08/29/24 0929   BP: 124/76  (!) 129/92 (!) 129/92   Pulse: 94 74 98    Resp: 16  16    Temp: 98.4 °F (36.9 °C)  98.4 °F (36.9 °C)    TempSrc: Oral  Oral    SpO2: 96%  96%    Weight:       Height:         Telemetry: SR    Gen: Well-developed, well-nourished, in no acute distress  Neck: Supple,   Resp: No accessory muscle use, Clear breath sounds, No rales or rhonchi  Card: Regular Rate,Rhythm, Normal S1, S2, No murmurs, rubs or gallop. No thrills.   Abd:   Soft, non-tender, non-distended, BS+   MSK: No cyanosis  Skin: No rashes    Neuro: Moving all four extremities, follows commands appropriately  Psych:  Oriented to person, place, alert, Nml Affect  LE: No lower extremity edema    Data Review:     Radiology:   XR Results (most recent):  CT Result (most recent):  CT CHEST ABDOMEN PELVIS W CONTRAST 08/27/2024    Narrative  INDICATION: hypoxia, abdominal pain, nausea/vomting    COMPARISON: 1/25/2013    TECHNIQUE:  Following the uneventful intravenous administration of 100

## 2024-08-29 NOTE — DISCHARGE SUMMARY
Physician Discharge Summary     Patient ID:  Jami Mitchell  [id number]  74 y.o.  1950    Admit date: 8/27/2024    Discharge date of service and time: 8/29/2024  Greater than 30 minutes were spent providing discharge related services for this patient    Admission Diagnoses: Septicemia (HCC) [A41.9]  Generalized abdominal pain [R10.84]  Elevated troponin [R79.89]  Nausea vomiting and diarrhea [R11.2, R19.7]  Sepsis (HCC) [A41.9]    Discharge Diagnoses:    Principal Diagnosis   Takotsubo cardiomyopathy                                             Hospital Course and other diagnoses  Takotsubo cardiomyopathy / Non-ST elevated myocardial infarction / Elevated troponin - Consulted cardiology.  Cath 8/28 without CAD.  Presumed takoksubo.  Getting ASA and atorvastatin.  Start coreg and losartan.  Outpatient follow up    Sepsis / Fever / Leukocytosis / Sinus tachycardia - POA, unclear etiology.  Unclear if bacterial.  Procalcitonin undetectable.  WBC mild and stable. Pan CT without any focus of infection.  UA clean.  Check enteric and viral panel.  Stop empiric Zosyn.    Nausea vomiting and diarrhea / Abdominal pain - POA, unclear etiology.  Viral GE likely.  Resume diet.  IVF  Prn zofran.    Hypokalemia / Lactic acid acidosis / Dehydration - POA due to poor PO intake and GI loss.  Lactic acid resolved with IVF. Hydrate and monitor. Replete K PO    Hyperlipidemia - continue statin      Depression with anxiety - Continue escitalopram      Hx Postmenopausal hormone replacement therapy - Stopped >10 year ago.    PCP: Miguel Ángel Diaz MD    Consults: cardiology    Significant Diagnostic Studies: See Hospital Course    Discharged home in improved condition.    Discharge Exam:  BP: 129/92   Pulse: 98   Resp: 16   Temp: 98.4 °F (36.9 °C)   TempSrc: Oral   SpO2: 96%   Weight: 56 kg   Height: 157 cm      Gen:  Well-developed, well-nourished, in no acute distress  HEENT:  Pink conjunctivae, PERRL, hearing intact to voice, moist

## 2024-08-30 LAB
L PNEUMO1 AG UR QL IA: NEGATIVE
SPECIMEN SOURCE: NORMAL

## 2024-09-01 LAB
BACTERIA SPEC CULT: NORMAL
SERVICE CMNT-IMP: NORMAL

## 2024-09-06 ENCOUNTER — OFFICE VISIT (OUTPATIENT)
Age: 74
End: 2024-09-06

## 2024-09-06 VITALS
RESPIRATION RATE: 16 BRPM | OXYGEN SATURATION: 95 % | HEIGHT: 62 IN | HEART RATE: 76 BPM | BODY MASS INDEX: 23.37 KG/M2 | TEMPERATURE: 98 F | WEIGHT: 127 LBS | SYSTOLIC BLOOD PRESSURE: 119 MMHG | DIASTOLIC BLOOD PRESSURE: 66 MMHG

## 2024-09-06 DIAGNOSIS — A41.9 SEPSIS, DUE TO UNSPECIFIED ORGANISM, UNSPECIFIED WHETHER ACUTE ORGAN DYSFUNCTION PRESENT (HCC): ICD-10-CM

## 2024-09-06 DIAGNOSIS — E87.6 HYPOKALEMIA: ICD-10-CM

## 2024-09-06 DIAGNOSIS — F33.1 MODERATE EPISODE OF RECURRENT MAJOR DEPRESSIVE DISORDER (HCC): ICD-10-CM

## 2024-09-06 DIAGNOSIS — F41.9 ANXIETY: ICD-10-CM

## 2024-09-06 DIAGNOSIS — I51.81 TAKOTSUBO CARDIOMYOPATHY: ICD-10-CM

## 2024-09-06 DIAGNOSIS — E78.5 HYPERLIPIDEMIA LDL GOAL <130: ICD-10-CM

## 2024-09-06 DIAGNOSIS — Z09 HOSPITAL DISCHARGE FOLLOW-UP: Primary | ICD-10-CM

## 2024-09-06 RX ORDER — BUSPIRONE HYDROCHLORIDE 5 MG/1
5 TABLET ORAL 2 TIMES DAILY
Qty: 60 TABLET | Refills: 0 | Status: SHIPPED | OUTPATIENT
Start: 2024-09-06 | End: 2024-10-06

## 2024-09-06 RX ORDER — HYDROXYZINE HYDROCHLORIDE 25 MG/1
25 TABLET, FILM COATED ORAL EVERY 8 HOURS PRN
Qty: 90 TABLET | Refills: 0 | Status: SHIPPED | OUTPATIENT
Start: 2024-09-06 | End: 2024-10-06

## 2024-09-06 NOTE — PROGRESS NOTES
Post-Discharge Transitional Care  Follow Up      Jami Mitchell   YOB: 1950    Date of Office Visit:  9/6/2024  Date of Hospital Admission: 8/27/24  Date of Hospital Discharge: 8/29/24  Risk of hospital readmission (high >=14%. Medium >=10%) :Readmission Risk Score: 10.1      Care management risk score Rising risk (score 2-5) and Complex Care (Scores >=6): No Risk Score On File     Non face to face  following discharge, date last encounter closed (first attempt may have been earlier): 08/30/2024    Call initiated 2 business days of discharge: Yes    ASSESSMENT/PLAN:   Hospital discharge follow-up  -     NM DISCHARGE MEDS RECONCILED W/ CURRENT OUTPATIENT MED LIST  Takotsubo cardiomyopathy -was found to have elevated troponin and seen by cardiology.  Had a cath on 8/28 without coronary artery disease so Takotsubo was presumed.  Was started on Coreg and losartan and has a follow-up with cardiology later this month.  Sepsis, due to unspecified organism, unspecified whether acute organ dysfunction present (HCC) -resolved.  Labs reviewed and no organism was identified and blood culture still negative.  Hypokalemia -likely secondary to GI losses.  Will check BMP today to ensure it has resolved.  -     Basic Metabolic Panel; Future  Moderate episode of recurrent major depressive disorder (HCC) -uncontrolled.  Advised patient to seek therapy and she is agreeable although she does not know if she is able to afford it or if her insurance will pay for it.  She has been on Celexa for many years and reports it was working well for her.  Discussed alternative medication as well as addition of BuSpar due to coinciding anxiety.  Will start BuSpar and follow-up in 1 week.  Anxiety -chronic, uncontrolled. Advised patient to seek therapy and she is agreeable although she does not know if she is able to afford it or if her insurance will pay for it.  She has been on Celexa for many years and reports it was working

## 2024-09-06 NOTE — PROGRESS NOTES
Chief Complaint   Patient presents with    Follow-Up from Hospital     For broken heart syndrome (Takotsubo cardiomyopathy)    Established New Doctor     Previous PCP was Dr. Diaz     \"Have you been to the ER, urgent care clinic since your last visit?  Hospitalized since your last visit?\"    YES. 10 days ago; [facility].    “Have you seen or consulted any other health care providers outside of [facility] since your last visit?”    NO    Have you had a mammogram?”   NO    Date of last Mammogram: 1/25/2019         “Have you had a colorectal cancer screening such as a colonoscopy/FIT/Cologuard?    NO    Date of last Colonoscopy: 11/2/2010  Date of last Cologuard: 7/19/2021  No FIT/FOBT on file   No flexible sigmoidoscopy on file         Click Here for Release of Records Request             3/8/2024    10:45 AM   PHQ-9    Little interest or pleasure in doing things 0   Feeling down, depressed, or hopeless 0   PHQ-2 Score 0   PHQ-9 Total Score 0           Financial Resource Strain: Low Risk  (1/30/2023)    Overall Financial Resource Strain (CARDIA)     Difficulty of Paying Living Expenses: Not hard at all      Food Insecurity: No Food Insecurity (8/27/2024)    Hunger Vital Sign     Worried About Running Out of Food in the Last Year: Never true     Ran Out of Food in the Last Year: Never true          Health Maintenance Due   Topic Date Due    Shingles vaccine (1 of 2) Never done    Respiratory Syncytial Virus (RSV) Pregnant or age 60 yrs+ (1 - 1-dose 60+ series) Never done    Breast cancer screen  01/25/2020    Pneumococcal 65+ years Vaccine (2 of 2 - PCV) 06/01/2022    Annual Wellness Visit (Medicare)  01/31/2024    Colorectal Cancer Screen  07/19/2024    Flu vaccine (1) Never done    COVID-19 Vaccine (4 - 2023-24 season) 09/01/2024

## 2024-09-16 ENCOUNTER — OFFICE VISIT (OUTPATIENT)
Age: 74
End: 2024-09-16
Payer: MEDICARE

## 2024-09-16 ENCOUNTER — TELEPHONE (OUTPATIENT)
Age: 74
End: 2024-09-16

## 2024-09-16 VITALS
OXYGEN SATURATION: 98 % | WEIGHT: 128.8 LBS | DIASTOLIC BLOOD PRESSURE: 62 MMHG | BODY MASS INDEX: 23.7 KG/M2 | SYSTOLIC BLOOD PRESSURE: 112 MMHG | HEART RATE: 62 BPM | HEIGHT: 62 IN

## 2024-09-16 DIAGNOSIS — E87.6 HYPOKALEMIA: ICD-10-CM

## 2024-09-16 DIAGNOSIS — I51.81 STRESS-INDUCED CARDIOMYOPATHY: Primary | ICD-10-CM

## 2024-09-16 DIAGNOSIS — E78.5 HYPERLIPIDEMIA LDL GOAL <130: ICD-10-CM

## 2024-09-16 DIAGNOSIS — I51.81 TAKOTSUBO CARDIOMYOPATHY: ICD-10-CM

## 2024-09-16 DIAGNOSIS — F32.89 OTHER DEPRESSION: Primary | ICD-10-CM

## 2024-09-16 PROCEDURE — G8427 DOCREV CUR MEDS BY ELIG CLIN: HCPCS

## 2024-09-16 PROCEDURE — 99214 OFFICE O/P EST MOD 30 MIN: CPT

## 2024-09-16 PROCEDURE — 1036F TOBACCO NON-USER: CPT

## 2024-09-16 PROCEDURE — 90791 PSYCH DIAGNOSTIC EVALUATION: CPT | Performed by: SOCIAL WORKER

## 2024-09-16 PROCEDURE — 1111F DSCHRG MED/CURRENT MED MERGE: CPT

## 2024-09-16 PROCEDURE — 3017F COLORECTAL CA SCREEN DOC REV: CPT

## 2024-09-16 PROCEDURE — 1090F PRES/ABSN URINE INCON ASSESS: CPT

## 2024-09-16 PROCEDURE — 1123F ACP DISCUSS/DSCN MKR DOCD: CPT | Performed by: SOCIAL WORKER

## 2024-09-16 PROCEDURE — G8420 CALC BMI NORM PARAMETERS: HCPCS

## 2024-09-16 PROCEDURE — 93010 ELECTROCARDIOGRAM REPORT: CPT

## 2024-09-16 PROCEDURE — 1123F ACP DISCUSS/DSCN MKR DOCD: CPT

## 2024-09-16 PROCEDURE — G8399 PT W/DXA RESULTS DOCUMENT: HCPCS

## 2024-09-16 PROCEDURE — 93005 ELECTROCARDIOGRAM TRACING: CPT

## 2024-09-16 RX ORDER — CARVEDILOL 6.25 MG/1
6.25 TABLET ORAL 2 TIMES DAILY WITH MEALS
Qty: 180 TABLET | Refills: 3 | Status: SHIPPED | OUTPATIENT
Start: 2024-09-16

## 2024-09-16 RX ORDER — LOSARTAN POTASSIUM 25 MG/1
25 TABLET ORAL DAILY
Qty: 90 TABLET | Refills: 3 | Status: SHIPPED | OUTPATIENT
Start: 2024-09-16

## 2024-09-16 NOTE — TELEPHONE ENCOUNTER
Spoke to pt and informed her unfortunately appt she has 9/20 at 4 pm needed to be rescheduled due to Dr. Rodriguez out sick for the week. Scheduled pt next available OV 10/1/24. Pt said this is a follow up for heart attack and is concerned and would like to get in sooner. Pt would like for nurse to give her a call if she can be seen sooner than 10/1/24. Best callback number is [phone].-TM 9/16/24

## 2024-09-17 NOTE — TELEPHONE ENCOUNTER
I contacted this patient, her name &  were verified.  Patient wants an earlier appointment than 10.01.2024 with her PCP.  I informed patient that there are no openings currently, but if we do get an opening before 10.01.2024, I will schedule her.

## 2024-09-18 NOTE — PROGRESS NOTES
Physician Progress Note      PATIENT:               KANDY MORAES  CSN #:                  [id number]  :                       1950  ADMIT DATE:       2024 2:10 PM  DISCH DATE:        2024 11:51 AM  RESPONDING  PROVIDER #:        Octavio Johnson MD          QUERY TEXT:    Good Afternoon    This patient admitted on 2024 with Sepsis. Also noted with NSTEMI and   down for cardiac cath , Echo showed evidence of Takotsubo's cardiomyopathy    If possible, can you know clarify the NSTEMI , and please document in progress   notes and discharge summary    The medical record reflects the following:  Risk Factors: Sepsis, Hyperlipidemia. Nausea and vomiting  Clinical Indicators: Presented with abdominal pain, NO chest pain, Troponins   noted [phone], Cards was consulted, and pt down for cath lab. Cath   without CAD, Echo with evidence of EF of 34%.  Treatment: Cardiology consulted, troponins, Cont. Coreg, Losartan, ASA,   Atorvastatin. Tx GI with the IVF and PRN Zofran. S topped Zosyn.    Thank you  ALEA CutlerN,RN, CPHQ, CCDS, SMART  Options provided:  -- After Study NSTEMI ruled out. Takotsubo Cardiomyopathy is confirmed.  -- NSTEMI is Confirmed.  -- Other - I will add my own diagnosis  -- Disagree - Not applicable / Not valid  -- Disagree - Clinically unable to determine / Unknown  -- Refer to Clinical Documentation Reviewer    PROVIDER RESPONSE TEXT:    NSETMI ruled out Takotsubo Cardiomyopathy only confirmed.    Query created by: Rika Leyva on 2024 9:23 AM      Electronically signed by:  Octavio Johnson MD 2024 4:32 PM

## 2024-09-23 RX ORDER — ROSUVASTATIN CALCIUM 10 MG/1
10 TABLET, COATED ORAL DAILY
Qty: 90 TABLET | Refills: 1 | Status: SHIPPED | OUTPATIENT
Start: 2024-09-23

## 2024-09-23 RX ORDER — ROSUVASTATIN CALCIUM 10 MG/1
10 TABLET, COATED ORAL DAILY
Qty: 90 TABLET | Refills: 0 | OUTPATIENT
Start: 2024-09-23

## 2024-09-27 PROBLEM — E86.0 DEHYDRATION: Status: RESOLVED | Noted: 2024-08-28 | Resolved: 2024-09-27

## 2024-10-01 ENCOUNTER — OFFICE VISIT (OUTPATIENT)
Age: 74
End: 2024-10-01
Payer: MEDICARE

## 2024-10-01 VITALS
HEIGHT: 64 IN | TEMPERATURE: 97.7 F | OXYGEN SATURATION: 97 % | SYSTOLIC BLOOD PRESSURE: 103 MMHG | WEIGHT: 128.4 LBS | RESPIRATION RATE: 16 BRPM | DIASTOLIC BLOOD PRESSURE: 54 MMHG | BODY MASS INDEX: 21.92 KG/M2 | HEART RATE: 70 BPM

## 2024-10-01 DIAGNOSIS — Z12.11 SCREENING FOR COLORECTAL CANCER: ICD-10-CM

## 2024-10-01 DIAGNOSIS — E87.6 HYPOKALEMIA: ICD-10-CM

## 2024-10-01 DIAGNOSIS — Z12.12 SCREENING FOR COLORECTAL CANCER: ICD-10-CM

## 2024-10-01 DIAGNOSIS — F41.9 ANXIETY: ICD-10-CM

## 2024-10-01 DIAGNOSIS — D72.829 LEUKOCYTOSIS, UNSPECIFIED TYPE: ICD-10-CM

## 2024-10-01 DIAGNOSIS — Z00.00 MEDICARE ANNUAL WELLNESS VISIT, SUBSEQUENT: Primary | ICD-10-CM

## 2024-10-01 DIAGNOSIS — I95.2 HYPOTENSION DUE TO DRUGS: ICD-10-CM

## 2024-10-01 PROCEDURE — 99214 OFFICE O/P EST MOD 30 MIN: CPT | Performed by: STUDENT IN AN ORGANIZED HEALTH CARE EDUCATION/TRAINING PROGRAM

## 2024-10-01 RX ORDER — BUSPIRONE HYDROCHLORIDE 5 MG/1
5 TABLET ORAL 2 TIMES DAILY
Qty: 180 TABLET | Refills: 1 | Status: SHIPPED | OUTPATIENT
Start: 2024-10-01

## 2024-10-01 RX ORDER — LOSARTAN POTASSIUM 25 MG/1
12.5 TABLET ORAL DAILY
Qty: 90 TABLET | Refills: 3
Start: 2024-10-01

## 2024-10-01 ASSESSMENT — PATIENT HEALTH QUESTIONNAIRE - PHQ9
9. THOUGHTS THAT YOU WOULD BE BETTER OFF DEAD, OR OF HURTING YOURSELF: NOT AT ALL
SUM OF ALL RESPONSES TO PHQ9 QUESTIONS 1 & 2: 3
SUM OF ALL RESPONSES TO PHQ QUESTIONS 1-9: 4
1. LITTLE INTEREST OR PLEASURE IN DOING THINGS: NOT AT ALL
SUM OF ALL RESPONSES TO PHQ QUESTIONS 1-9: 4
6. FEELING BAD ABOUT YOURSELF - OR THAT YOU ARE A FAILURE OR HAVE LET YOURSELF OR YOUR FAMILY DOWN: SEVERAL DAYS
10. IF YOU CHECKED OFF ANY PROBLEMS, HOW DIFFICULT HAVE THESE PROBLEMS MADE IT FOR YOU TO DO YOUR WORK, TAKE CARE OF THINGS AT HOME, OR GET ALONG WITH OTHER PEOPLE: SOMEWHAT DIFFICULT
5. POOR APPETITE OR OVEREATING: NOT AT ALL
4. FEELING TIRED OR HAVING LITTLE ENERGY: NOT AT ALL
7. TROUBLE CONCENTRATING ON THINGS, SUCH AS READING THE NEWSPAPER OR WATCHING TELEVISION: NOT AT ALL
SUM OF ALL RESPONSES TO PHQ QUESTIONS 1-9: 4
SUM OF ALL RESPONSES TO PHQ QUESTIONS 1-9: 4
8. MOVING OR SPEAKING SO SLOWLY THAT OTHER PEOPLE COULD HAVE NOTICED. OR THE OPPOSITE, BEING SO FIGETY OR RESTLESS THAT YOU HAVE BEEN MOVING AROUND A LOT MORE THAN USUAL: NOT AT ALL
3. TROUBLE FALLING OR STAYING ASLEEP: NOT AT ALL
2. FEELING DOWN, DEPRESSED OR HOPELESS: NEARLY EVERY DAY

## 2024-10-01 ASSESSMENT — LIFESTYLE VARIABLES
HOW MANY STANDARD DRINKS CONTAINING ALCOHOL DO YOU HAVE ON A TYPICAL DAY: PATIENT DOES NOT DRINK
HOW OFTEN DO YOU HAVE A DRINK CONTAINING ALCOHOL: NEVER

## 2024-10-01 NOTE — PROGRESS NOTES
Chief Complaint   Patient presents with    1 Month Follow-Up    Medicare AW     Follow Up     \"Have you been to the ER, urgent care clinic since your last visit?  Hospitalized since your last visit?\"    NO    “Have you seen or consulted any other health care providers outside of [facility] since your last visit?”    NO    Have you had a mammogram?”   NO    Date of last Mammogram: 1/25/2019         “Have you had a colorectal cancer screening such as a colonoscopy/FIT/Cologuard?    NO    Date of last Colonoscopy: 11/2/2010  Date of last Cologuard: 7/19/2021  No FIT/FOBT on file   No flexible sigmoidoscopy on file         Click Here for Release of Records Request             10/1/2024     9:41 AM   PHQ-9    Little interest or pleasure in doing things 0   Feeling down, depressed, or hopeless 3   Trouble falling or staying asleep, or sleeping too much 0   Feeling tired or having little energy 0   Poor appetite or overeating 0   Feeling bad about yourself - or that you are a failure or have let yourself or your family down 1   Trouble concentrating on things, such as reading the newspaper or watching television 0   Moving or speaking so slowly that other people could have noticed. Or the opposite - being so fidgety or restless that you have been moving around a lot more than usual 0   Thoughts that you would be better off dead, or of hurting yourself in some way 0   PHQ-2 Score 3   PHQ-9 Total Score 4   If you checked off any problems, how difficult have these problems made it for you to do your work, take care of things at home, or get along with other people? 1           Financial Resource Strain: Low Risk  (1/30/2023)    Overall Financial Resource Strain (CARDIA)     Difficulty of Paying Living Expenses: Not hard at all      Food Insecurity: No Food Insecurity (8/27/2024)    Hunger Vital Sign     Worried About Running Out of Food in the Last Year: Never true     Ran Out of Food in the Last Year: Never 
unsteady or are you worried about falling? : no  2 or more falls in past year?: (!) yes  Fall with injury in past year?: (!) yes     Interventions:    Reviewed medications, home hazards, visual acuity, and co-morbidities that can increase risk for falls            General HRA Questions:  Select all that apply: (!) Stress, Anger  Interventions - Stress:  Patient comments: Patient started seeing a therapist and is feeling much better  Interventions - Anger:  Patient comments: Patient started seeing a therapist and is feeling much better.            Safety:  Do you have any tripping hazards - loose or unsecured carpets or rugs?: (!) Yes  Do you have non-slip mats or non-slip surfaces or shower bars or grab bars in your shower or bathtub?: (!) No  Interventions:  Patient comments: She will work on securing the carpet/rugs at home and placing nonslip mats in her shower.     Advanced Directives:  Do you have a Living Will?: (!) No    Intervention:  has NO advanced directive - information provided                     Objective   Vitals:    10/01/24 0953   BP: (!) 103/54   Site: Left Upper Arm   Position: Sitting   Cuff Size: Small Adult   Pulse: 70   Resp: 16   Temp: 97.7 °F (36.5 °C)   TempSrc: Temporal   SpO2: 97%   Weight: 58.2 kg (128 lb 6.4 oz)   Height: 1.626 m (5' 4\")      Body mass index is 22.04 kg/m².                    Allergies   Allergen Reactions    Bupropion      Other reaction(s): Unable to Obtain     Prior to Visit Medications    Medication Sig Taking? Authorizing Provider   busPIRone (BUSPAR) 5 MG tablet Take 1 tablet by mouth 2 times daily Yes Stoney Rodriguez MD   losartan (COZAAR) 25 MG tablet Take 0.5 tablets by mouth daily Yes Stoney Rodriguez MD   rosuvastatin (CRESTOR) 10 MG tablet Take 1 tablet by mouth daily Yes Stoney Rodriguez MD   carvedilol (COREG) 6.25 MG tablet Take 1 tablet by mouth 2 times daily (with meals) Yes Nancie Brice, APRN - NP   hydrOXYzine HCl (ATARAX) 25 MG tablet Take 1

## 2024-10-02 DIAGNOSIS — F41.9 ANXIETY: ICD-10-CM

## 2024-10-02 LAB
ANION GAP SERPL CALC-SCNC: 3 MMOL/L (ref 2–12)
BASOPHILS # BLD: 0 K/UL (ref 0–0.1)
BASOPHILS NFR BLD: 1 % (ref 0–1)
BUN SERPL-MCNC: 12 MG/DL (ref 6–20)
BUN/CREAT SERPL: 15 (ref 12–20)
CALCIUM SERPL-MCNC: 9.4 MG/DL (ref 8.5–10.1)
CHLORIDE SERPL-SCNC: 109 MMOL/L (ref 97–108)
CO2 SERPL-SCNC: 31 MMOL/L (ref 21–32)
CREAT SERPL-MCNC: 0.78 MG/DL (ref 0.55–1.02)
DIFFERENTIAL METHOD BLD: NORMAL
EOSINOPHIL # BLD: 0.1 K/UL (ref 0–0.4)
EOSINOPHIL NFR BLD: 2 % (ref 0–7)
ERYTHROCYTE [DISTWIDTH] IN BLOOD BY AUTOMATED COUNT: 13.3 % (ref 11.5–14.5)
GLUCOSE SERPL-MCNC: 86 MG/DL (ref 65–100)
HCT VFR BLD AUTO: 39.3 % (ref 35–47)
HGB BLD-MCNC: 12.5 G/DL (ref 11.5–16)
IMM GRANULOCYTES # BLD AUTO: 0 K/UL (ref 0–0.04)
IMM GRANULOCYTES NFR BLD AUTO: 0 % (ref 0–0.5)
LYMPHOCYTES # BLD: 2.6 K/UL (ref 0.8–3.5)
LYMPHOCYTES NFR BLD: 41 % (ref 12–49)
MCH RBC QN AUTO: 29.6 PG (ref 26–34)
MCHC RBC AUTO-ENTMCNC: 31.8 G/DL (ref 30–36.5)
MCV RBC AUTO: 93.1 FL (ref 80–99)
MONOCYTES # BLD: 0.4 K/UL (ref 0–1)
MONOCYTES NFR BLD: 6 % (ref 5–13)
NEUTS SEG # BLD: 3.1 K/UL (ref 1.8–8)
NEUTS SEG NFR BLD: 50 % (ref 32–75)
NRBC # BLD: 0 K/UL (ref 0–0.01)
NRBC BLD-RTO: 0 PER 100 WBC
PERIPHERAL SMEAR, MD REVIEW: NORMAL
PLATELET # BLD AUTO: 336 K/UL (ref 150–400)
PMV BLD AUTO: 10.3 FL (ref 8.9–12.9)
POTASSIUM SERPL-SCNC: 4 MMOL/L (ref 3.5–5.1)
RBC # BLD AUTO: 4.22 M/UL (ref 3.8–5.2)
SODIUM SERPL-SCNC: 143 MMOL/L (ref 136–145)
WBC # BLD AUTO: 6.3 K/UL (ref 3.6–11)

## 2024-10-03 RX ORDER — BUSPIRONE HYDROCHLORIDE 5 MG/1
5 TABLET ORAL 2 TIMES DAILY
Qty: 180 TABLET | Refills: 1 | OUTPATIENT
Start: 2024-10-03

## 2024-10-16 ENCOUNTER — OFFICE VISIT (OUTPATIENT)
Age: 74
End: 2024-10-16
Payer: MEDICARE

## 2024-10-16 VITALS
WEIGHT: 130.4 LBS | DIASTOLIC BLOOD PRESSURE: 76 MMHG | TEMPERATURE: 98.7 F | BODY MASS INDEX: 22.26 KG/M2 | HEIGHT: 64 IN | HEART RATE: 62 BPM | SYSTOLIC BLOOD PRESSURE: 152 MMHG | OXYGEN SATURATION: 98 % | RESPIRATION RATE: 16 BRPM

## 2024-10-16 DIAGNOSIS — I10 PRIMARY HYPERTENSION: ICD-10-CM

## 2024-10-16 DIAGNOSIS — Z53.20 MAMMOGRAM DECLINED: ICD-10-CM

## 2024-10-16 DIAGNOSIS — F41.8 DEPRESSION WITH ANXIETY: Primary | ICD-10-CM

## 2024-10-16 DIAGNOSIS — F32.89 OTHER DEPRESSION: Primary | ICD-10-CM

## 2024-10-16 PROBLEM — R10.9 ABDOMINAL PAIN: Status: RESOLVED | Noted: 2024-08-28 | Resolved: 2024-10-16

## 2024-10-16 PROBLEM — E87.6 HYPOKALEMIA: Status: RESOLVED | Noted: 2024-08-28 | Resolved: 2024-10-16

## 2024-10-16 PROBLEM — R11.2 NAUSEA VOMITING AND DIARRHEA: Status: RESOLVED | Noted: 2024-08-28 | Resolved: 2024-10-16

## 2024-10-16 PROBLEM — I21.4 NSTEMI (NON-ST ELEVATED MYOCARDIAL INFARCTION) (HCC): Status: RESOLVED | Noted: 2024-08-28 | Resolved: 2024-10-16

## 2024-10-16 PROBLEM — R19.7 NAUSEA VOMITING AND DIARRHEA: Status: RESOLVED | Noted: 2024-08-28 | Resolved: 2024-10-16

## 2024-10-16 PROBLEM — A41.9 SEPSIS (HCC): Status: RESOLVED | Noted: 2024-08-27 | Resolved: 2024-10-16

## 2024-10-16 PROBLEM — D72.829 LEUKOCYTOSIS: Status: RESOLVED | Noted: 2024-08-28 | Resolved: 2024-10-16

## 2024-10-16 PROBLEM — R00.0 SINUS TACHYCARDIA: Status: RESOLVED | Noted: 2024-08-28 | Resolved: 2024-10-16

## 2024-10-16 PROBLEM — E87.20 LACTIC ACID ACIDOSIS: Status: RESOLVED | Noted: 2024-08-28 | Resolved: 2024-10-16

## 2024-10-16 PROBLEM — R50.9 FEVER: Status: RESOLVED | Noted: 2024-08-28 | Resolved: 2024-10-16

## 2024-10-16 PROCEDURE — 99214 OFFICE O/P EST MOD 30 MIN: CPT | Performed by: STUDENT IN AN ORGANIZED HEALTH CARE EDUCATION/TRAINING PROGRAM

## 2024-10-16 PROCEDURE — 1123F ACP DISCUSS/DSCN MKR DOCD: CPT | Performed by: SOCIAL WORKER

## 2024-10-16 PROCEDURE — 90837 PSYTX W PT 60 MINUTES: CPT | Performed by: SOCIAL WORKER

## 2024-10-16 ASSESSMENT — PATIENT HEALTH QUESTIONNAIRE - PHQ9
6. FEELING BAD ABOUT YOURSELF - OR THAT YOU ARE A FAILURE OR HAVE LET YOURSELF OR YOUR FAMILY DOWN: SEVERAL DAYS
4. FEELING TIRED OR HAVING LITTLE ENERGY: NOT AT ALL
5. POOR APPETITE OR OVEREATING: NOT AT ALL
7. TROUBLE CONCENTRATING ON THINGS, SUCH AS READING THE NEWSPAPER OR WATCHING TELEVISION: NOT AT ALL
SUM OF ALL RESPONSES TO PHQ QUESTIONS 1-9: 1
10. IF YOU CHECKED OFF ANY PROBLEMS, HOW DIFFICULT HAVE THESE PROBLEMS MADE IT FOR YOU TO DO YOUR WORK, TAKE CARE OF THINGS AT HOME, OR GET ALONG WITH OTHER PEOPLE: NOT DIFFICULT AT ALL
SUM OF ALL RESPONSES TO PHQ QUESTIONS 1-9: 1
2. FEELING DOWN, DEPRESSED OR HOPELESS: NOT AT ALL
SUM OF ALL RESPONSES TO PHQ9 QUESTIONS 1 & 2: 0
SUM OF ALL RESPONSES TO PHQ QUESTIONS 1-9: 1
SUM OF ALL RESPONSES TO PHQ QUESTIONS 1-9: 1
1. LITTLE INTEREST OR PLEASURE IN DOING THINGS: NOT AT ALL
9. THOUGHTS THAT YOU WOULD BE BETTER OFF DEAD, OR OF HURTING YOURSELF: NOT AT ALL
3. TROUBLE FALLING OR STAYING ASLEEP: NOT AT ALL
8. MOVING OR SPEAKING SO SLOWLY THAT OTHER PEOPLE COULD HAVE NOTICED. OR THE OPPOSITE, BEING SO FIGETY OR RESTLESS THAT YOU HAVE BEEN MOVING AROUND A LOT MORE THAN USUAL: NOT AT ALL

## 2024-10-16 NOTE — PSYCHOTHERAPY
In office -Follow Up    Time Start:3:06 pm  Time End:4:04 pm    DX:    Diagnosis Orders   1. Other depression             Met with Ms. Mitchell today for our follow up appt. Improving:   This patient presented today with a report of feeling more energy, increased motivation, appetite increase and overall brighter affect.  She states that the medication is helping her feel \"so much better.\"  Additionally, her  has been taking medication and seeing a therapist.  With the addition of his medication he has not only been less critical and abrasive when talking to his wife but he has also cut back on his drinking, per Ms. Mitchell.  She reports she has actually seen a significant change in his behavior toward her.  She is hopeful these changes continue.  Overall, the patient is doing better and she is not reporting any acute symptoms.    We did set a follow-up appointment with this clinician.      Follow-up appt date (if applicable) is:  2 weeks    Marcia Hargrove LCSW  \

## 2024-10-16 NOTE — PROGRESS NOTES
No chief complaint on file.    \"Have you been to the ER, urgent care clinic since your last visit?  Hospitalized since your last visit?\"    NO    “Have you seen or consulted any other health care providers outside of [facility] since your last visit?”    NO    Have you had a mammogram?”   NO    Date of last Mammogram: 1/25/2019         “Have you had a colorectal cancer screening such as a colonoscopy/FIT/Cologuard?    NO    Date of last Colonoscopy: 11/2/2010  Date of last Cologuard: 7/19/2021  No FIT/FOBT on file   No flexible sigmoidoscopy on file         Click Here for Release of Records Request             10/4/2024    10:35 AM   PHQ-9    Little interest or pleasure in doing things 1   Feeling down, depressed, or hopeless 1   PHQ-2 Score 2   PHQ-9 Total Score 2           Financial Resource Strain: Low Risk  (10/4/2024)    Overall Financial Resource Strain (CARDIA)     Difficulty of Paying Living Expenses: Not very hard      Food Insecurity: No Food Insecurity (10/4/2024)    Hunger Vital Sign     Worried About Running Out of Food in the Last Year: Never true     Ran Out of Food in the Last Year: Never true          Health Maintenance Due   Topic Date Due    Breast cancer screen  01/25/2020    Colorectal Cancer Screen  07/19/2024        
No jaundice. No cyanosis. No pallor.   Cardio Normal rate, regular rhythm.    Pulmonary Effort normal. CTAB. No wheezes, rales, or rhonchi.     Abdominal Soft. Bowel sounds normal.    Extremities No edema of lower extremities. Pulses present.     Neurological No focal deficits.   Skin No rash.                Please note that this dictation was completed with [employer], the computer voice recognition software.  Quite often unanticipated grammatical, syntax, homophones, and other interpretive errors are inadvertently transcribed by the computer software.  Please disregard these errors.  Please excuse any errors that have escaped final proofreading.     Stoney Rodriguez MD  10/16/24

## 2024-10-16 NOTE — PROGRESS NOTES
In office Follow Up Billing    Session Time     Start Time:    3:06 pm  Finish Time:  4:04 pm    Total time spent for this encounter:  58 minutes    We did set a follow-up appointment with this clinician.      Return in about 2 weeks (around 10/30/2024).     Therapy Modalities   Cognitive Behavioral, Client Empowerment, and Mindfulness    Assessment / Plan     Psychotherapy Target Symptoms:  depressed mood    Assessment:  Improving:   This patient presented today with a report of feeling more energy, increased motivation, appetite increase and overall brighter affect.  She states that the medication is helping her feel \"so much better.\"  Additionally, her  has been taking medication and seeing a therapist.  With the addition of his medication he has not only been less critical and abrasive when talking to his wife but he has also cut back on his drinking, per Ms. Mitchell.  She reports she has actually seen a significant change in his behavior toward her.  She is hopeful these changes continue.  Overall, the patient is doing better and she is not reporting any acute symptoms.    Plan:  continue psychotherapy    1. Other depression       --Marcia Hargrove LCSW on 10/16/2024 at 4:22 PM    An electronic signature was used to authenticate this note.

## 2024-10-24 LAB — NONINV COLON CA DNA+OCC BLD SCRN STL QL: NEGATIVE

## 2024-11-13 ENCOUNTER — OFFICE VISIT (OUTPATIENT)
Age: 74
End: 2024-11-13
Payer: MEDICARE

## 2024-11-13 VITALS
OXYGEN SATURATION: 95 % | TEMPERATURE: 97.8 F | DIASTOLIC BLOOD PRESSURE: 78 MMHG | WEIGHT: 130.6 LBS | RESPIRATION RATE: 15 BRPM | HEART RATE: 76 BPM | BODY MASS INDEX: 22.3 KG/M2 | HEIGHT: 64 IN | SYSTOLIC BLOOD PRESSURE: 134 MMHG

## 2024-11-13 DIAGNOSIS — I10 PRIMARY HYPERTENSION: ICD-10-CM

## 2024-11-13 DIAGNOSIS — F41.8 DEPRESSION WITH ANXIETY: Primary | ICD-10-CM

## 2024-11-13 PROCEDURE — 99213 OFFICE O/P EST LOW 20 MIN: CPT | Performed by: STUDENT IN AN ORGANIZED HEALTH CARE EDUCATION/TRAINING PROGRAM

## 2024-11-13 ASSESSMENT — PATIENT HEALTH QUESTIONNAIRE - PHQ9
2. FEELING DOWN, DEPRESSED OR HOPELESS: NOT AT ALL
10. IF YOU CHECKED OFF ANY PROBLEMS, HOW DIFFICULT HAVE THESE PROBLEMS MADE IT FOR YOU TO DO YOUR WORK, TAKE CARE OF THINGS AT HOME, OR GET ALONG WITH OTHER PEOPLE: NOT DIFFICULT AT ALL
8. MOVING OR SPEAKING SO SLOWLY THAT OTHER PEOPLE COULD HAVE NOTICED. OR THE OPPOSITE, BEING SO FIGETY OR RESTLESS THAT YOU HAVE BEEN MOVING AROUND A LOT MORE THAN USUAL: NOT AT ALL
6. FEELING BAD ABOUT YOURSELF - OR THAT YOU ARE A FAILURE OR HAVE LET YOURSELF OR YOUR FAMILY DOWN: NOT AT ALL
SUM OF ALL RESPONSES TO PHQ QUESTIONS 1-9: 0
SUM OF ALL RESPONSES TO PHQ9 QUESTIONS 1 & 2: 0
3. TROUBLE FALLING OR STAYING ASLEEP: NOT AT ALL
4. FEELING TIRED OR HAVING LITTLE ENERGY: NOT AT ALL
9. THOUGHTS THAT YOU WOULD BE BETTER OFF DEAD, OR OF HURTING YOURSELF: NOT AT ALL
7. TROUBLE CONCENTRATING ON THINGS, SUCH AS READING THE NEWSPAPER OR WATCHING TELEVISION: NOT AT ALL
SUM OF ALL RESPONSES TO PHQ QUESTIONS 1-9: 0
SUM OF ALL RESPONSES TO PHQ QUESTIONS 1-9: 0
5. POOR APPETITE OR OVEREATING: NOT AT ALL
1. LITTLE INTEREST OR PLEASURE IN DOING THINGS: NOT AT ALL
SUM OF ALL RESPONSES TO PHQ QUESTIONS 1-9: 0
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
## 2024-12-23 RX ORDER — CARVEDILOL 6.25 MG/1
6.25 TABLET ORAL 2 TIMES DAILY WITH MEALS
Qty: 180 TABLET | Refills: 0 | Status: SHIPPED | OUTPATIENT
Start: 2024-12-23

## 2025-01-28 ENCOUNTER — ANCILLARY PROCEDURE (OUTPATIENT)
Age: 75
End: 2025-01-28
Payer: MEDICARE

## 2025-01-28 ENCOUNTER — OFFICE VISIT (OUTPATIENT)
Age: 75
End: 2025-01-28
Payer: MEDICARE

## 2025-01-28 VITALS
BODY MASS INDEX: 22.36 KG/M2 | SYSTOLIC BLOOD PRESSURE: 180 MMHG | HEART RATE: 69 BPM | HEIGHT: 64 IN | WEIGHT: 131 LBS | DIASTOLIC BLOOD PRESSURE: 90 MMHG

## 2025-01-28 VITALS
OXYGEN SATURATION: 98 % | DIASTOLIC BLOOD PRESSURE: 88 MMHG | HEART RATE: 74 BPM | HEIGHT: 64 IN | SYSTOLIC BLOOD PRESSURE: 170 MMHG | BODY MASS INDEX: 22.36 KG/M2 | WEIGHT: 131 LBS

## 2025-01-28 DIAGNOSIS — E87.6 HYPOKALEMIA: ICD-10-CM

## 2025-01-28 DIAGNOSIS — I51.81 STRESS-INDUCED CARDIOMYOPATHY: ICD-10-CM

## 2025-01-28 DIAGNOSIS — I51.81 STRESS-INDUCED CARDIOMYOPATHY: Primary | ICD-10-CM

## 2025-01-28 DIAGNOSIS — I51.81 TAKOTSUBO CARDIOMYOPATHY: ICD-10-CM

## 2025-01-28 DIAGNOSIS — E78.5 HYPERLIPIDEMIA LDL GOAL <130: ICD-10-CM

## 2025-01-28 LAB
ECHO BSA: 1.64 M2
ECHO LA DIAMETER INDEX: 2.39 CM/M2
ECHO LA DIAMETER: 3.9 CM
ECHO LV EDV A2C: 81 ML
ECHO LV EDV A4C: 73 ML
ECHO LV EDV BP: 78 ML (ref 56–104)
ECHO LV EDV INDEX A4C: 45 ML/M2
ECHO LV EDV INDEX BP: 48 ML/M2
ECHO LV EDV NDEX A2C: 50 ML/M2
ECHO LV EJECTION FRACTION A2C: 56 %
ECHO LV EJECTION FRACTION A4C: 63 %
ECHO LV EJECTION FRACTION BIPLANE: 61 % (ref 55–100)
ECHO LV ESV A2C: 36 ML
ECHO LV ESV A4C: 27 ML
ECHO LV ESV BP: 31 ML (ref 19–49)
ECHO LV ESV INDEX A2C: 22 ML/M2
ECHO LV ESV INDEX A4C: 17 ML/M2
ECHO LV ESV INDEX BP: 19 ML/M2
ECHO LV FRACTIONAL SHORTENING: 38 % (ref 28–44)
ECHO LV INTERNAL DIMENSION DIASTOLE INDEX: 2.76 CM/M2
ECHO LV INTERNAL DIMENSION DIASTOLIC: 4.5 CM (ref 3.9–5.3)
ECHO LV INTERNAL DIMENSION SYSTOLIC INDEX: 1.72 CM/M2
ECHO LV INTERNAL DIMENSION SYSTOLIC: 2.8 CM
ECHO LV IVSD: 0.6 CM (ref 0.6–0.9)
ECHO LV MASS 2D: 78.9 G (ref 67–162)
ECHO LV MASS INDEX 2D: 48.4 G/M2 (ref 43–95)
ECHO LV POSTERIOR WALL DIASTOLIC: 0.6 CM (ref 0.6–0.9)
ECHO LV RELATIVE WALL THICKNESS RATIO: 0.27

## 2025-01-28 PROCEDURE — G8420 CALC BMI NORM PARAMETERS: HCPCS | Performed by: INTERNAL MEDICINE

## 2025-01-28 PROCEDURE — G8427 DOCREV CUR MEDS BY ELIG CLIN: HCPCS | Performed by: INTERNAL MEDICINE

## 2025-01-28 PROCEDURE — 99214 OFFICE O/P EST MOD 30 MIN: CPT | Performed by: INTERNAL MEDICINE

## 2025-01-28 PROCEDURE — 1036F TOBACCO NON-USER: CPT | Performed by: INTERNAL MEDICINE

## 2025-01-28 PROCEDURE — 1159F MED LIST DOCD IN RCRD: CPT | Performed by: INTERNAL MEDICINE

## 2025-01-28 PROCEDURE — 1160F RVW MEDS BY RX/DR IN RCRD: CPT | Performed by: INTERNAL MEDICINE

## 2025-01-28 PROCEDURE — G8399 PT W/DXA RESULTS DOCUMENT: HCPCS | Performed by: INTERNAL MEDICINE

## 2025-01-28 PROCEDURE — 3079F DIAST BP 80-89 MM HG: CPT | Performed by: INTERNAL MEDICINE

## 2025-01-28 PROCEDURE — 3077F SYST BP >= 140 MM HG: CPT | Performed by: INTERNAL MEDICINE

## 2025-01-28 PROCEDURE — 1090F PRES/ABSN URINE INCON ASSESS: CPT | Performed by: INTERNAL MEDICINE

## 2025-01-28 PROCEDURE — 3017F COLORECTAL CA SCREEN DOC REV: CPT | Performed by: INTERNAL MEDICINE

## 2025-01-28 PROCEDURE — 93308 TTE F-UP OR LMTD: CPT | Performed by: INTERNAL MEDICINE

## 2025-01-28 PROCEDURE — 1123F ACP DISCUSS/DSCN MKR DOCD: CPT | Performed by: INTERNAL MEDICINE

## 2025-01-28 PROCEDURE — 1126F AMNT PAIN NOTED NONE PRSNT: CPT | Performed by: INTERNAL MEDICINE

## 2025-01-28 RX ORDER — LOSARTAN POTASSIUM 25 MG/1
25 TABLET ORAL DAILY
Qty: 90 TABLET | Refills: 3 | Status: SHIPPED | OUTPATIENT
Start: 2025-01-28

## 2025-01-28 NOTE — PROGRESS NOTES
Chief Complaint   Patient presents with    Congestive Heart Failure    Cardiomyopathy    Hyperlipidemia     Vitals:    01/28/25 0947   BP: (!) 170/88   Site: Left Upper Arm   Position: Sitting   Pulse: 74   SpO2: 98%   Weight: 59.4 kg (131 lb)   Height: 1.626 m (5' 4\")         Chest pain: DENIED     Recent hospital stays: DENIED     Refills: DENIED

## 2025-01-28 NOTE — RESULT ENCOUNTER NOTE
Dear Ms. Mitcehll,  Good News!  Your test results show your heart function has returned to normal!  Please continue the current treatment plan.  We can discuss more at your scheduled follow up if you have questions.  Best Regards,  TRAVIS Buckner NP

## 2025-01-28 NOTE — PROGRESS NOTES
Patient: Jami Mitchell  : 1950    Primary Cardiologist:Kayla Avery MS, MD, [facility]   EP Cardiologist:   PCP: Stoney Rodriguez MD    Today's Date: 2025      ASSESSMENT AND PLAN:     Assessment and Plan:  Hospital Follow Up    2. HFrEF - takotsubo's cardiomyopathy   - cardiac cath 24 without significant CAD  - echo 24 with EF 34%, evidence of takotsubo's cardiomyopathy   - cont coreg 6.25 mg BID, losartan 25 mg daily  - well compensated on exam     3. HLD  - cont statin     4. Prolonged QTc  - QTc 470 from 24    5. Hx of hypokalemia   - resolved    6.  Prior tobacco  Does smoke THC.    Follow up with Dr. Avery in 3 months with limited echo.     No diagnosis found.      HISTORY OF PRESENT ILLNESS:     History of Present Illness:  Jami Mitchell is a 74 y.o. female presents today for follow-up. Was admitted to [facility] -24 with vomiting, diarrhea, abdominal pain. Troponin was elevated on admission. No anginal complaints.   Initial HS troponin 790 but trended up (708, 2087, 3528, 4322, 4874). EKG shows NSR with nonspecific st abnormality, Rmq=211.   Cardiac catheterization without significant CAD. Echo with EF 34%    Today she denies chest pain, shortness of breath, edema, palpitaitons.   Biggest complaint is anxiety/stress.   Has difficult relationship with .       PAST MEDICAL HISTORY:     Past Medical History:   Diagnosis Date    Abdominal pain 2024    Anxiety and depression     Former smoker     stopped     GERD (gastroesophageal reflux disease)     Hyperlipidemia     Hypokalemia 2024    NSTEMI (non-ST elevated myocardial infarction) (HCC) 2024    Postmenopausal HRT (hormone replacement therapy)     stopped     Primary hypertension 10/16/2024    Sepsis (HCC) 2024    Substance abuse (HCC)         Past Surgical History:   Procedure Laterality Date    ARM SURGERY Left     fx left arm plate and screws    BREAST BIOPSY Right

## 2025-02-04 ENCOUNTER — OFFICE VISIT (OUTPATIENT)
Age: 75
End: 2025-02-04
Payer: MEDICARE

## 2025-02-04 VITALS
RESPIRATION RATE: 16 BRPM | BODY MASS INDEX: 22.43 KG/M2 | DIASTOLIC BLOOD PRESSURE: 66 MMHG | TEMPERATURE: 98.2 F | WEIGHT: 131.4 LBS | SYSTOLIC BLOOD PRESSURE: 105 MMHG | HEIGHT: 64 IN | OXYGEN SATURATION: 97 % | HEART RATE: 82 BPM

## 2025-02-04 DIAGNOSIS — E78.5 HYPERLIPIDEMIA LDL GOAL <130: ICD-10-CM

## 2025-02-04 DIAGNOSIS — F33.1 MODERATE EPISODE OF RECURRENT MAJOR DEPRESSIVE DISORDER (HCC): ICD-10-CM

## 2025-02-04 DIAGNOSIS — Z53.20 MAMMOGRAM DECLINED: ICD-10-CM

## 2025-02-04 DIAGNOSIS — I10 PRIMARY HYPERTENSION: Primary | ICD-10-CM

## 2025-02-04 DIAGNOSIS — I51.81 TAKOTSUBO CARDIOMYOPATHY: ICD-10-CM

## 2025-02-04 PROCEDURE — G8420 CALC BMI NORM PARAMETERS: HCPCS | Performed by: STUDENT IN AN ORGANIZED HEALTH CARE EDUCATION/TRAINING PROGRAM

## 2025-02-04 PROCEDURE — G8399 PT W/DXA RESULTS DOCUMENT: HCPCS | Performed by: STUDENT IN AN ORGANIZED HEALTH CARE EDUCATION/TRAINING PROGRAM

## 2025-02-04 PROCEDURE — 3074F SYST BP LT 130 MM HG: CPT | Performed by: STUDENT IN AN ORGANIZED HEALTH CARE EDUCATION/TRAINING PROGRAM

## 2025-02-04 PROCEDURE — 99214 OFFICE O/P EST MOD 30 MIN: CPT | Performed by: STUDENT IN AN ORGANIZED HEALTH CARE EDUCATION/TRAINING PROGRAM

## 2025-02-04 PROCEDURE — 1123F ACP DISCUSS/DSCN MKR DOCD: CPT | Performed by: STUDENT IN AN ORGANIZED HEALTH CARE EDUCATION/TRAINING PROGRAM

## 2025-02-04 PROCEDURE — G8427 DOCREV CUR MEDS BY ELIG CLIN: HCPCS | Performed by: STUDENT IN AN ORGANIZED HEALTH CARE EDUCATION/TRAINING PROGRAM

## 2025-02-04 PROCEDURE — 1126F AMNT PAIN NOTED NONE PRSNT: CPT | Performed by: STUDENT IN AN ORGANIZED HEALTH CARE EDUCATION/TRAINING PROGRAM

## 2025-02-04 PROCEDURE — 1159F MED LIST DOCD IN RCRD: CPT | Performed by: STUDENT IN AN ORGANIZED HEALTH CARE EDUCATION/TRAINING PROGRAM

## 2025-02-04 PROCEDURE — 1090F PRES/ABSN URINE INCON ASSESS: CPT | Performed by: STUDENT IN AN ORGANIZED HEALTH CARE EDUCATION/TRAINING PROGRAM

## 2025-02-04 PROCEDURE — 3017F COLORECTAL CA SCREEN DOC REV: CPT | Performed by: STUDENT IN AN ORGANIZED HEALTH CARE EDUCATION/TRAINING PROGRAM

## 2025-02-04 PROCEDURE — 3078F DIAST BP <80 MM HG: CPT | Performed by: STUDENT IN AN ORGANIZED HEALTH CARE EDUCATION/TRAINING PROGRAM

## 2025-02-04 PROCEDURE — G2211 COMPLEX E/M VISIT ADD ON: HCPCS | Performed by: STUDENT IN AN ORGANIZED HEALTH CARE EDUCATION/TRAINING PROGRAM

## 2025-02-04 PROCEDURE — 1036F TOBACCO NON-USER: CPT | Performed by: STUDENT IN AN ORGANIZED HEALTH CARE EDUCATION/TRAINING PROGRAM

## 2025-02-04 RX ORDER — HYDROXYZINE HYDROCHLORIDE 25 MG/1
25 TABLET, FILM COATED ORAL 3 TIMES DAILY PRN
COMMUNITY

## 2025-02-04 ASSESSMENT — PATIENT HEALTH QUESTIONNAIRE - PHQ9
8. MOVING OR SPEAKING SO SLOWLY THAT OTHER PEOPLE COULD HAVE NOTICED. OR THE OPPOSITE, BEING SO FIGETY OR RESTLESS THAT YOU HAVE BEEN MOVING AROUND A LOT MORE THAN USUAL: NOT AT ALL
9. THOUGHTS THAT YOU WOULD BE BETTER OFF DEAD, OR OF HURTING YOURSELF: NOT AT ALL
SUM OF ALL RESPONSES TO PHQ QUESTIONS 1-9: 0
1. LITTLE INTEREST OR PLEASURE IN DOING THINGS: NOT AT ALL
SUM OF ALL RESPONSES TO PHQ QUESTIONS 1-9: 0
6. FEELING BAD ABOUT YOURSELF - OR THAT YOU ARE A FAILURE OR HAVE LET YOURSELF OR YOUR FAMILY DOWN: NOT AT ALL
SUM OF ALL RESPONSES TO PHQ9 QUESTIONS 1 & 2: 0
10. IF YOU CHECKED OFF ANY PROBLEMS, HOW DIFFICULT HAVE THESE PROBLEMS MADE IT FOR YOU TO DO YOUR WORK, TAKE CARE OF THINGS AT HOME, OR GET ALONG WITH OTHER PEOPLE: NOT DIFFICULT AT ALL
7. TROUBLE CONCENTRATING ON THINGS, SUCH AS READING THE NEWSPAPER OR WATCHING TELEVISION: NOT AT ALL
4. FEELING TIRED OR HAVING LITTLE ENERGY: NOT AT ALL
2. FEELING DOWN, DEPRESSED OR HOPELESS: NOT AT ALL
5. POOR APPETITE OR OVEREATING: NOT AT ALL
3. TROUBLE FALLING OR STAYING ASLEEP: NOT AT ALL
SUM OF ALL RESPONSES TO PHQ QUESTIONS 1-9: 0
SUM OF ALL RESPONSES TO PHQ QUESTIONS 1-9: 0

## 2025-02-04 NOTE — PROGRESS NOTES
Assessment/Plan:     1. Primary hypertension -chronic, stable.  Losartan recently increased to 25 mg daily by cardiology and home blood pressure log reviewed which shows BP consistently at goal.  Continue current treatment plan.   -     Comprehensive Metabolic Panel; Future  2. Moderate episode of recurrent major depressive disorder (HCC) -chronic, stable.  Continue current treatment plan.   3. Takotsubo cardiomyopathy -chronic, stable. Follows with cardiology, Dr. Avery.  Most recent cardiology note from 1/28/2025 as well as most recent echo from 1/28/2025 reviewed which shows EF of 61%.  Continue current treatment plan.  4. Hyperlipidemia LDL goal <130 -chronic, stable.  Continue current treatment plan.   -     Lipid Panel; Future  5. Mammogram declined       Follow up:     Return in about 6 months (around 8/4/2025) for Chronic conditions follow up.      I have reviewed patient medical and social history and medications.  I have reviewed pertinent labs results and other data. I have discussed the diagnosis with the patient and the intended plan as seen in the above orders. The patient has received an after-visit summary and questions were answered concerning future plans. I have discussed medication side effects and warnings with the patient as well.        Subjective:     Chief Complaint   Patient presents with    Follow-up       HPI:  Jami Mitchell is a 74 y.o. female that presents for: Chronic conditions follow-up.  Patient is doing really well and is happy about her mental health and overall wellbeing.  She recently had follow-up with her cardiologist and reports most recent echo looks excellent.  She also reports her depression and anxiety is well-controlled.  Her blood pressure medication was recently increased by cardiology and she has her home blood pressure readings which shows BP consistently at goal.  She still declines mammogram and denies any chest pain, shortness of breath or any other complaints

## 2025-02-04 NOTE — PROGRESS NOTES
No chief complaint on file.    \"Have you been to the ER, urgent care clinic since your last visit?  Hospitalized since your last visit?\"    NO    “Have you seen or consulted any other health care providers outside of [facility] since your last visit?”    NO    Have you had a mammogram?”   NO    Date of last Mammogram: 1/25/2019             Click Here for Release of Records Request             11/13/2024     2:47 PM   PHQ-9    Little interest or pleasure in doing things 0   Feeling down, depressed, or hopeless 0   Trouble falling or staying asleep, or sleeping too much 0   Feeling tired or having little energy 0   Poor appetite or overeating 0   Feeling bad about yourself - or that you are a failure or have let yourself or your family down 0   Trouble concentrating on things, such as reading the newspaper or watching television 0   Moving or speaking so slowly that other people could have noticed. Or the opposite - being so fidgety or restless that you have been moving around a lot more than usual 0   Thoughts that you would be better off dead, or of hurting yourself in some way 0   PHQ-2 Score 0   PHQ-9 Total Score 0   If you checked off any problems, how difficult have these problems made it for you to do your work, take care of things at home, or get along with other people? 0           Financial Resource Strain: Low Risk  (10/4/2024)    Overall Financial Resource Strain (CARDIA)     Difficulty of Paying Living Expenses: Not very hard      Food Insecurity: No Food Insecurity (10/4/2024)    Hunger Vital Sign     Worried About Running Out of Food in the Last Year: Never true     Ran Out of Food in the Last Year: Never true          Health Maintenance Due   Topic Date Due    Breast cancer screen  01/25/2020

## 2025-02-05 LAB
ALBUMIN SERPL-MCNC: 3.7 G/DL (ref 3.5–5)
ALBUMIN/GLOB SERPL: 1.3 (ref 1.1–2.2)
ALP SERPL-CCNC: 68 U/L (ref 45–117)
ALT SERPL-CCNC: 14 U/L (ref 12–78)
ANION GAP SERPL CALC-SCNC: 4 MMOL/L (ref 2–12)
AST SERPL-CCNC: 8 U/L (ref 15–37)
BILIRUB SERPL-MCNC: 0.3 MG/DL (ref 0.2–1)
BUN SERPL-MCNC: 15 MG/DL (ref 6–20)
BUN/CREAT SERPL: 18 (ref 12–20)
CALCIUM SERPL-MCNC: 9.1 MG/DL (ref 8.5–10.1)
CHLORIDE SERPL-SCNC: 109 MMOL/L (ref 97–108)
CHOLEST SERPL-MCNC: 164 MG/DL
CO2 SERPL-SCNC: 27 MMOL/L (ref 21–32)
CREAT SERPL-MCNC: 0.84 MG/DL (ref 0.55–1.02)
GLOBULIN SER CALC-MCNC: 2.8 G/DL (ref 2–4)
GLUCOSE SERPL-MCNC: 112 MG/DL (ref 65–100)
HDLC SERPL-MCNC: 57 MG/DL
HDLC SERPL: 2.9 (ref 0–5)
LDLC SERPL CALC-MCNC: 73.4 MG/DL (ref 0–100)
POTASSIUM SERPL-SCNC: 3.7 MMOL/L (ref 3.5–5.1)
PROT SERPL-MCNC: 6.5 G/DL (ref 6.4–8.2)
SODIUM SERPL-SCNC: 140 MMOL/L (ref 136–145)
TRIGL SERPL-MCNC: 168 MG/DL
VLDLC SERPL CALC-MCNC: 33.6 MG/DL

## 2025-02-10 RX ORDER — LOSARTAN POTASSIUM 25 MG/1
25 TABLET ORAL DAILY
Qty: 90 TABLET | Refills: 3 | OUTPATIENT
Start: 2025-02-10

## 2025-03-04 RX ORDER — CITALOPRAM HYDROBROMIDE 40 MG/1
40 TABLET ORAL DAILY
Qty: 90 TABLET | Refills: 0 | Status: SHIPPED | OUTPATIENT
Start: 2025-03-04

## 2025-03-04 NOTE — TELEPHONE ENCOUNTER
Last appointment: 02/04/2025 MD Rodriguez   Next appointment: Nothing scheduled   Previous refill encounter(s):   03/08/2024 Celexa #90 with 3 refills.     For Pharmacy Admin Tracking Only    Program: Medication Refill  Intervention Detail: New Rx: 1, reason: Patient Preference  Time Spent (min): 5    Requested Prescriptions     Pending Prescriptions Disp Refills    citalopram (CELEXA) 40 MG tablet 90 tablet 0     Sig: Take 1 tablet by mouth daily

## 2025-03-13 RX ORDER — ROSUVASTATIN CALCIUM 10 MG/1
10 TABLET, COATED ORAL DAILY
Qty: 90 TABLET | Refills: 1 | Status: SHIPPED | OUTPATIENT
Start: 2025-03-13

## 2025-03-13 NOTE — TELEPHONE ENCOUNTER
PCP: Stoney Rodriguez MD    Last appt: 2/4/2025   Future Appointments   Date Time Provider Department Center   8/13/2025  2:40 PM Kayla Avery MD [facility]       Requested Prescriptions     Pending Prescriptions Disp Refills    rosuvastatin (CRESTOR) 10 MG tablet 90 tablet 1     Sig: Take 1 tablet by mouth daily

## 2025-03-18 RX ORDER — ROSUVASTATIN CALCIUM 10 MG/1
10 TABLET, COATED ORAL DAILY
Qty: 90 TABLET | Refills: 1 | OUTPATIENT
Start: 2025-03-18

## 2025-03-28 DIAGNOSIS — F41.9 ANXIETY: ICD-10-CM

## 2025-03-28 RX ORDER — BUSPIRONE HYDROCHLORIDE 5 MG/1
5 TABLET ORAL 2 TIMES DAILY
Qty: 180 TABLET | Refills: 1 | Status: SHIPPED | OUTPATIENT
Start: 2025-03-28

## 2025-03-28 NOTE — TELEPHONE ENCOUNTER
PCP: Stoney Rodriguez MD    Last appt: 2/4/2025       Future Appointments   Date Time Provider Department Center   8/13/2025  2:40 PM Kayla Avery MD [facility]       Requested Prescriptions     Pending Prescriptions Disp Refills    busPIRone (BUSPAR) 5 MG tablet 180 tablet 1     Sig: Take 1 tablet by mouth 2 times daily       Prior labs and Blood pressures:  BP Readings from Last 3 Encounters:   02/04/25 105/66   01/28/25 (!) 170/88   01/28/25 (!) 180/90     Lab Results   Component Value Date/Time     02/04/2025 03:52 PM    K 3.7 02/04/2025 03:52 PM     02/04/2025 03:52 PM    CO2 27 02/04/2025 03:52 PM    BUN 15 02/04/2025 03:52 PM    GFRAA >60 06/15/2022 11:48 AM     No results found for: \"HBA1C\", \"VEI3OPIS\"  Lab Results   Component Value Date/Time    CHOL 164 02/04/2025 03:52 PM    HDL 57 02/04/2025 03:52 PM    LDL 73.4 02/04/2025 03:52 PM    .8 03/08/2024 11:35 AM    VLDL 33.6 02/04/2025 03:52 PM    VLDL 20 05/26/2021 10:07 AM     No results found for: \"VITD3\"    No results found for: \"TSH\", \"TSH2\", \"TSH3\"

## 2025-05-13 RX ORDER — VALACYCLOVIR HYDROCHLORIDE 500 MG/1
500 TABLET, FILM COATED ORAL DAILY
Qty: 90 TABLET | Refills: 3 | Status: SHIPPED | OUTPATIENT
Start: 2025-05-13

## 2025-05-13 NOTE — TELEPHONE ENCOUNTER
Patient mychart request- has cold sore outbreak and also request from [facility] fax.  Thanks, Shala    Last appointment: 2/4/25 Michael  Next appointment: None  Previous refill encounter(s): 3/8/24 90 + 3    Requested Prescriptions     Pending Prescriptions Disp Refills    valACYclovir (VALTREX) 500 MG tablet 90 tablet 3     Sig: Take 1 tablet by mouth daily     For Pharmacy Admin Tracking Only    Program: Medication Refill  CPA in place:    Recommendation Provided To:   Intervention Detail: New Rx: 1, reason: Patient Preference  Intervention Accepted By:   Gap Closed?:    Time Spent (min): 5

## (undated) DEVICE — COVER US PRB W15XL120CM W/ GEL RUBBERBAND TAPE STRP FLD GEN

## (undated) DEVICE — HI-TORQUE VERSACORE MODIFIED J GUIDE WIRE SYSTEM 260 CM: Brand: HI-TORQUE VERSACORE

## (undated) DEVICE — GLIDESHEATH SLENDER STAINLESS STEEL KIT: Brand: GLIDESHEATH SLENDER

## (undated) DEVICE — BAND COMPR L24CM REG CLR PLAS HEMSTAT EXT HK AND LOOP RETEN

## (undated) DEVICE — SC 3W HP RA OFF NB - PG: Brand: NAMIC

## (undated) DEVICE — KENDALL DL ECG DUAL CONNECT RADIOLUCENT LEAD WIRES, 5-LEAD, SINGLE PATIENT USE: Brand: KENDALL

## (undated) DEVICE — HEART CATH-SFMC: Brand: MEDLINE INDUSTRIES, INC.

## (undated) DEVICE — MEDI-TRACE CADENCE ADULT, DEFIBRILLATION ELECTRODE -RTS  (10 PR/PK) - PHYSIO-CONTROL: Brand: MEDI-TRACE CADENCE

## (undated) DEVICE — TUBING PRSS MON L12IN PVC RIG NONEXPANDING M TO FEM CONN

## (undated) DEVICE — ARGO BAGZ FLUID MANAGEMENT SYSTEM: Brand: ARGO BAGZ FLUID MANAGEMENT SYSTEM

## (undated) DEVICE — VALVE IV REFLX W/ M/F LUER LCK UNIV CAP STRL DISP

## (undated) DEVICE — GUIDEWIRE VASC L180CM DIA0.035IN 3MM PTFE J TIP EXCHG FIX

## (undated) DEVICE — RADIFOCUS GLIDEWIRE: Brand: GLIDEWIRE

## (undated) DEVICE — MEDTRONIC JR4.0 DIAGNOSTIC CATHETER

## (undated) DEVICE — CATHETER DIAG 5FR L100CM LUMN ID0.047IN JL3.5 CRV 0 SIDE H